# Patient Record
Sex: MALE | Race: OTHER | Employment: UNEMPLOYED | URBAN - METROPOLITAN AREA
[De-identification: names, ages, dates, MRNs, and addresses within clinical notes are randomized per-mention and may not be internally consistent; named-entity substitution may affect disease eponyms.]

---

## 2017-04-19 ENCOUNTER — OFFICE VISIT (OUTPATIENT)
Dept: FAMILY MEDICINE CLINIC | Age: 57
End: 2017-04-19

## 2017-04-19 ENCOUNTER — HOSPITAL ENCOUNTER (OUTPATIENT)
Dept: LAB | Age: 57
Discharge: HOME | End: 2017-04-19

## 2017-04-19 VITALS
WEIGHT: 128 LBS | TEMPERATURE: 97.7 F | SYSTOLIC BLOOD PRESSURE: 141 MMHG | HEART RATE: 60 BPM | HEIGHT: 70 IN | DIASTOLIC BLOOD PRESSURE: 84 MMHG | BODY MASS INDEX: 18.32 KG/M2

## 2017-04-19 DIAGNOSIS — J11.1 INFLUENZA: Primary | ICD-10-CM

## 2017-04-19 DIAGNOSIS — Z13.1 SCREENING FOR DIABETES MELLITUS: ICD-10-CM

## 2017-04-19 DIAGNOSIS — Z13.0 SCREENING FOR IRON DEFICIENCY ANEMIA: ICD-10-CM

## 2017-04-19 LAB
ALBUMIN SERPL BCP-MCNC: 4.4 G/DL (ref 3.5–5)
ALBUMIN/GLOB SERPL: 1.6 {RATIO} (ref 1.1–2.2)
ALP SERPL-CCNC: 83 U/L (ref 45–117)
ALT SERPL-CCNC: 32 U/L (ref 12–78)
ANION GAP BLD CALC-SCNC: 6 MMOL/L (ref 5–15)
AST SERPL W P-5'-P-CCNC: 25 U/L (ref 15–37)
BASOPHILS # BLD AUTO: 0 K/UL (ref 0–0.1)
BASOPHILS # BLD: 0 % (ref 0–1)
BILIRUB SERPL-MCNC: 0.8 MG/DL (ref 0.2–1)
BUN SERPL-MCNC: 7 MG/DL (ref 6–20)
BUN/CREAT SERPL: 8 (ref 12–20)
CALCIUM SERPL-MCNC: 9 MG/DL (ref 8.5–10.1)
CHLORIDE SERPL-SCNC: 105 MMOL/L (ref 97–108)
CO2 SERPL-SCNC: 30 MMOL/L (ref 21–32)
CREAT SERPL-MCNC: 0.86 MG/DL (ref 0.7–1.3)
EOSINOPHIL # BLD: 0.2 K/UL (ref 0–0.4)
EOSINOPHIL NFR BLD: 3 % (ref 0–7)
ERYTHROCYTE [DISTWIDTH] IN BLOOD BY AUTOMATED COUNT: 14.4 % (ref 11.5–14.5)
EST. AVERAGE GLUCOSE BLD GHB EST-MCNC: 123 MG/DL
GLOBULIN SER CALC-MCNC: 2.8 G/DL (ref 2–4)
GLUCOSE POC: NORMAL MG/DL
GLUCOSE SERPL-MCNC: 100 MG/DL (ref 65–100)
HBA1C MFR BLD: 5.9 % (ref 4.2–6.3)
HCT VFR BLD AUTO: 44.5 % (ref 36.6–50.3)
HGB BLD-MCNC: 14.4 G/DL
HGB BLD-MCNC: 15.3 G/DL (ref 12.1–17)
LYMPHOCYTES # BLD AUTO: 31 % (ref 12–49)
LYMPHOCYTES # BLD: 1.7 K/UL (ref 0.8–3.5)
MCH RBC QN AUTO: 30.7 PG (ref 26–34)
MCHC RBC AUTO-ENTMCNC: 34.4 G/DL (ref 30–36.5)
MCV RBC AUTO: 89.2 FL (ref 80–99)
MONOCYTES # BLD: 0.4 K/UL (ref 0–1)
MONOCYTES NFR BLD AUTO: 6 % (ref 5–13)
NEUTS SEG # BLD: 3.3 K/UL (ref 1.8–8)
NEUTS SEG NFR BLD AUTO: 60 % (ref 32–75)
PLATELET # BLD AUTO: 221 K/UL (ref 150–400)
POTASSIUM SERPL-SCNC: 4.3 MMOL/L (ref 3.5–5.1)
PROT SERPL-MCNC: 7.2 G/DL (ref 6.4–8.2)
RBC # BLD AUTO: 4.99 M/UL (ref 4.1–5.7)
SODIUM SERPL-SCNC: 141 MMOL/L (ref 136–145)
TSH SERPL DL<=0.05 MIU/L-ACNC: 1.5 UIU/ML (ref 0.36–3.74)
WBC # BLD AUTO: 5.6 K/UL (ref 4.1–11.1)

## 2017-04-19 PROCEDURE — 80053 COMPREHEN METABOLIC PANEL: CPT | Performed by: NURSE PRACTITIONER

## 2017-04-19 PROCEDURE — 87389 HIV-1 AG W/HIV-1&-2 AB AG IA: CPT | Performed by: NURSE PRACTITIONER

## 2017-04-19 PROCEDURE — 85025 COMPLETE CBC W/AUTO DIFF WBC: CPT | Performed by: NURSE PRACTITIONER

## 2017-04-19 PROCEDURE — 84443 ASSAY THYROID STIM HORMONE: CPT | Performed by: NURSE PRACTITIONER

## 2017-04-19 PROCEDURE — 83036 HEMOGLOBIN GLYCOSYLATED A1C: CPT | Performed by: NURSE PRACTITIONER

## 2017-04-19 RX ORDER — IBUPROFEN 600 MG/1
600 TABLET ORAL
Qty: 30 TAB | Refills: 0 | Status: SHIPPED | OUTPATIENT
Start: 2017-04-19 | End: 2018-01-02

## 2017-04-19 NOTE — PROGRESS NOTES
Coordination of Care  1. Have you been to the ER, urgent care clinic since your last visit? Hospitalized since your last visit? No    2. Have you seen or consulted any other health care providers outside of the [address] [place] [address] since your last visit? Include any pap smears or colon screening.  No    Medications  Medication Reconciliation Performed: no  Patient does need refills     Learning Assessment Complete? yes  Results for orders placed or performed in visit on 04/19/17   AMB POC HEMOGLOBIN (HGB)   Result Value Ref Range    Hemoglobin (POC) 14.4

## 2017-04-19 NOTE — PROGRESS NOTES
Assessment/Plan:       ICD-10-CM ICD-9-CM    1. Influenza J11.1 487.1 HEMOGLOBIN A1C WITH EAG      METABOLIC PANEL, COMPREHENSIVE      CBC WITH AUTOMATED DIFF      HIV 2 AB W/REFL IB      TSH 3RD GENERATION      ibuprofen (MOTRIN) 600 mg tablet   2. Screening for iron deficiency anemia Z13.0 V78.0 AMB POC HEMOGLOBIN (HGB)      CANCELED: HEMOGLOBIN A1C WITH EAG   3. Screening for diabetes mellitus Z13.1 V77.1 AMB POC GLUCOSE BLOOD, BY GLUCOSE MONITORING DEVICE     Follow-up Disposition:  Return if symptoms worsen or fail to improve. [facility]  Subjective:   Kiki Jean Baptiste is a 64 y.o. OTHER male who speaks Tamazight. Chief Complaint   Patient presents with    Generalized Body Aches     dizzy, feels like a fever in the morning    History of Present Illness: for 15 days. Fever, bodyache,and when he bends over he gets dizzy. Took tylenol and amoxicillin for body aches. The last 2 days no amoxicillin. 2 d ago last took Tylenol. He has a problem getting out of bed in the morning and low appetite. He did not get the vaccine. No change in urination. He does get a burning sensation in the rectum. No one else he knows has had this. Taking coffee with organic ganoderma Lucidum Powder, helps him go to the bathroom; rectum does burn sometimes after  Review of Systems: Negative for: fever, chest pain, shortness of breath, leg swelling, exertional dyspnea, palpitations. Past Surgical History: He  has no past surgical history on file. Social History: He  reports that he has never smoked. He has never used smokeless tobacco. He reports that he does not drink alcohol or use illicit drugs. Objective:     Vitals:    04/19/17 0928   BP: 141/84   Pulse: 60   Temp: 97.7 °F (36.5 °C)   TempSrc: Oral   Weight: 128 lb (58.1 kg)   Height: 5' 9.88\" (1.775 m)    No LMP for male patient.    Wt Readings from Last 2 Encounters:   04/19/17 128 lb (58.1 kg)   03/24/16 136 lb (61.7 kg)   I note weight loss.  Lab Review:  Discussed not anemic, not diabetic. Results for orders placed or performed in visit on 04/19/17   AMB POC HEMOGLOBIN (HGB)   Result Value Ref Range    Hemoglobin (POC) 14.4    AMB POC GLUCOSE BLOOD, BY GLUCOSE MONITORING DEVICE   Result Value Ref Range    Glucose POC 88 non fasting mg/dL      Physical Examination: Thin although not emaciated. General appearance - no acute distress. Chest - clear to auscultation. Heart - regular rate and rhythm without murmurs, rubs, or gallops. Abdomen - bowel sounds present x 4, NT, ND. Extremities - pulses intact. No peripheral edema. Assessment/Plan:   Sohail Gibson was seen today for generalized body aches. Diagnoses and all orders for this visit:    Influenza  -     HEMOGLOBIN A1C WITH EAG; Future  -     METABOLIC PANEL, COMPREHENSIVE; Future  -     CBC WITH AUTOMATED DIFF; Future  -     HIV 2 AB W/REFL IB; Future  -     TSH 3RD GENERATION; Future  -     ibuprofen (MOTRIN) 600 mg tablet; Take 1 Tab by mouth every six (6) hours as needed for Pain. Faroese sig    Screening for iron deficiency anemia  -     AMB POC HEMOGLOBIN (HGB)  -     Cancel: HEMOGLOBIN A1C WITH EAG; Future    Screening for diabetes mellitus  -     AMB POC GLUCOSE BLOOD, BY GLUCOSE MONITORING DEVICE    Education re: influenza vaccine strongly recommended if still available for this season, in 2 weeks, or if not available, for future seasons - once a year. Follow-up Disposition:  Return if symptoms worsen or fail to improve. Franco Schaumann, MSN, RN, FNP-BC, BC-ADM  Fred Ness expressed understanding of this plan.

## 2017-04-21 LAB
HIV 1+2 AB+HIV1 P24 AG SERPL QL IA: NONREACTIVE
HIV12 RESULT COMMENT, HHIVC: NORMAL

## 2018-01-02 ENCOUNTER — OFFICE VISIT (OUTPATIENT)
Dept: FAMILY MEDICINE CLINIC | Age: 58
End: 2018-01-02

## 2018-01-02 ENCOUNTER — HOSPITAL ENCOUNTER (OUTPATIENT)
Dept: LAB | Age: 58
Discharge: HOME | End: 2018-01-02

## 2018-01-02 VITALS
BODY MASS INDEX: 19.29 KG/M2 | WEIGHT: 134 LBS | SYSTOLIC BLOOD PRESSURE: 138 MMHG | TEMPERATURE: 98.4 F | DIASTOLIC BLOOD PRESSURE: 76 MMHG | HEART RATE: 73 BPM

## 2018-01-02 DIAGNOSIS — L30.9 DERMATITIS: Primary | ICD-10-CM

## 2018-01-02 DIAGNOSIS — M54.9 BACK PAIN, UNSPECIFIED BACK LOCATION, UNSPECIFIED BACK PAIN LATERALITY, UNSPECIFIED CHRONICITY: ICD-10-CM

## 2018-01-02 DIAGNOSIS — R68.2 DRY MOUTH: ICD-10-CM

## 2018-01-02 PROCEDURE — 83036 HEMOGLOBIN GLYCOSYLATED A1C: CPT | Performed by: NURSE PRACTITIONER

## 2018-01-02 RX ORDER — TRIAMCINOLONE ACETONIDE 1 MG/G
CREAM TOPICAL 2 TIMES DAILY
Qty: 80 G | Refills: 0 | Status: SHIPPED | OUTPATIENT
Start: 2018-01-02 | End: 2018-11-05

## 2018-01-02 NOTE — PROGRESS NOTES
Assessment/Plan:       ICD-10-CM ICD-9-CM    1. Dermatitis L30.9 692.9 triamcinolone acetonide (KENALOG) 0.1 % topical cream      glycerin-mineral oil-lanolin (EUCERIN PLUS) topical cream   2. Dry mouth R68.2 527.7 HEMOGLOBIN A1C WITH EAG   3. Back pain, unspecified back location, unspecified back pain laterality, unspecified chronicity M54.9 724.5        [address], [address] [facility] [address]  [address]  [place] [facility] [address]  Phone: [phone] Fax: [phone]    [address], [address] [place] [id number]  [address] [facility] [address]  Phone: [phone] Fax: [phone]      [address]   Subjective:     Chief Complaint   Patient presents with    Back Pain     8/10 back/burning pain since about 1 months ago, worst at night    Other     hoarseness    Skin Problem     on and off itchinigness all over body since about 2 weeks ago    Anup Mims is a 62 y.o. OTHER male. HPI: Dry hands, worse in the winter. He has lost his voice 5 or 6 times. This gets better when he drinks water. Started in the summer. Having dryness in the nose. He  has a past medical history of BPH (benign prostatic hyperplasia). He has BPH (benign prostatic hyperplasia) and H/O colonoscopy on his problem list.   Review of Systems: Negative for: fever, chest pain, shortness of breath, leg swelling, exertional dyspnea, palpitations. Current Medications:   No current outpatient prescriptions on file prior to visit. No current facility-administered medications on file prior to visit. Past Surgical History: He  has no past surgical history on file. Social and Family History: He  reports that he has never smoked. He has never used smokeless tobacco. He reports that he does not drink alcohol or use illicit drugs. ; family history includes Arthritis-osteo in his father; Diabetes in his brother; Heart Disease in his brother; Heart Surgery in his brother; Parkinsonism in his mother. Objective:     Vitals:    01/02/18 1028   BP: 138/76   Pulse: 73   Temp: 98.4 °F (36.9 °C)   TempSrc: Oral   Weight: 134 lb (60.8 kg)    No LMP for male patient. Wt Readings from Last 2 Encounters:   01/02/18 134 lb (60.8 kg)   04/19/17 128 lb (58.1 kg)     No results found for any visits on 01/02/18. Physical Examination: No CVAT.  tenderness of paraspinal muscles from the cervical area to the lumbar area bilaterally. Also has sebaceous cyst right upper back. Dry fingers. General appearance - well developed, no acute distress. Chest - clear to auscultation. Heart - regular rate and rhythm without murmurs, rubs, or gallops. Abdomen - bowel sounds present x 4, NT, ND. Extremities - pulses intact. No peripheral edema. Assessment/Plan:   Diagnoses and all orders for this visit:    1. Dermatitis  -     triamcinolone acetonide (KENALOG) 0.1 % topical cream; Apply  to affected area two (2) times a day. use thin layer to your hands; Swedish sig  -     glycerin-mineral oil-lanolin (EUCERIN PLUS) topical cream; Apply  to affected area as needed for Dry Skin. 2. Dry mouth  -     HEMOGLOBIN A1C WITH EAG; Future    3. Back pain, unspecified back location, unspecified back pain laterality, unspecified chronicity    Back stretches, moist heat, prn ibuprofen. Follow-up Disposition:  Return if symptoms worsen or fail to improve. Diane Arias, DNP, FNP-BC, BC-ADM  Fred Sultana expressed understanding of this plan.

## 2018-01-02 NOTE — PROGRESS NOTES
Coordination of Care  1. Have you been to the ER, urgent care clinic since your last visit? Hospitalized since your last visit? No    2. Have you seen or consulted any other health care providers outside of the [address] [place] [address] since your last visit? Include any pap smears or colon screening. No    Medications  Does the patient need refills? NO    Learning Assessment Complete?  yes

## 2018-01-03 LAB
EST. AVERAGE GLUCOSE BLD GHB EST-MCNC: 114 MG/DL
HBA1C MFR BLD: 5.6 % (ref 4.2–6.3)

## 2018-09-18 ENCOUNTER — OFFICE VISIT (OUTPATIENT)
Dept: FAMILY MEDICINE CLINIC | Age: 58
End: 2018-09-18

## 2018-09-18 ENCOUNTER — HOSPITAL ENCOUNTER (OUTPATIENT)
Dept: LAB | Age: 58
Discharge: HOME | End: 2018-09-18

## 2018-09-18 VITALS
HEART RATE: 53 BPM | DIASTOLIC BLOOD PRESSURE: 78 MMHG | HEIGHT: 69 IN | BODY MASS INDEX: 19.43 KG/M2 | SYSTOLIC BLOOD PRESSURE: 133 MMHG | WEIGHT: 131.2 LBS | TEMPERATURE: 97.5 F

## 2018-09-18 DIAGNOSIS — R13.13 PHARYNGEAL DYSPHAGIA: Primary | ICD-10-CM

## 2018-09-18 DIAGNOSIS — R13.13 PHARYNGEAL DYSPHAGIA: ICD-10-CM

## 2018-09-18 DIAGNOSIS — Z11.2 SCREENING FOR STREPTOCOCCAL INFECTION: ICD-10-CM

## 2018-09-18 DIAGNOSIS — M54.9 MID-BACK PAIN, ACUTE: ICD-10-CM

## 2018-09-18 DIAGNOSIS — R19.06 EPIGASTRIC FULLNESS: ICD-10-CM

## 2018-09-18 LAB
ALBUMIN SERPL-MCNC: 4.5 G/DL (ref 3.5–5)
ALBUMIN/GLOB SERPL: 1.6 {RATIO} (ref 1.1–2.2)
ALP SERPL-CCNC: 80 U/L (ref 45–117)
ALT SERPL-CCNC: 27 U/L (ref 12–78)
ANION GAP SERPL CALC-SCNC: 7 MMOL/L (ref 5–15)
AST SERPL-CCNC: 27 U/L (ref 15–37)
BASOPHILS # BLD: 0 K/UL (ref 0–0.1)
BASOPHILS NFR BLD: 0 % (ref 0–1)
BILIRUB SERPL-MCNC: 1.1 MG/DL (ref 0.2–1)
BUN SERPL-MCNC: 10 MG/DL (ref 6–20)
BUN/CREAT SERPL: 11 (ref 12–20)
CALCIUM SERPL-MCNC: 8.9 MG/DL (ref 8.5–10.1)
CHLORIDE SERPL-SCNC: 101 MMOL/L (ref 97–108)
CO2 SERPL-SCNC: 30 MMOL/L (ref 21–32)
CREAT SERPL-MCNC: 0.89 MG/DL (ref 0.7–1.3)
DIFFERENTIAL METHOD BLD: NORMAL
EOSINOPHIL # BLD: 0.1 K/UL (ref 0–0.4)
EOSINOPHIL NFR BLD: 2 % (ref 0–7)
ERYTHROCYTE [DISTWIDTH] IN BLOOD BY AUTOMATED COUNT: 13.9 % (ref 11.5–14.5)
GLOBULIN SER CALC-MCNC: 2.9 G/DL (ref 2–4)
GLUCOSE SERPL-MCNC: 99 MG/DL (ref 65–100)
HCT VFR BLD AUTO: 43.7 % (ref 36.6–50.3)
HGB BLD-MCNC: 14.6 G/DL (ref 12.1–17)
IMM GRANULOCYTES # BLD: 0 K/UL (ref 0–0.04)
IMM GRANULOCYTES NFR BLD AUTO: 0 % (ref 0–0.5)
LIPASE SERPL-CCNC: 150 U/L (ref 73–393)
LYMPHOCYTES # BLD: 1.8 K/UL (ref 0.8–3.5)
LYMPHOCYTES NFR BLD: 31 % (ref 12–49)
MCH RBC QN AUTO: 30.7 PG (ref 26–34)
MCHC RBC AUTO-ENTMCNC: 33.4 G/DL (ref 30–36.5)
MCV RBC AUTO: 91.8 FL (ref 80–99)
MONOCYTES # BLD: 0.5 K/UL (ref 0–1)
MONOCYTES NFR BLD: 8 % (ref 5–13)
NEUTS SEG # BLD: 3.5 K/UL (ref 1.8–8)
NEUTS SEG NFR BLD: 59 % (ref 32–75)
NRBC # BLD: 0 K/UL (ref 0–0.01)
NRBC BLD-RTO: 0 PER 100 WBC
PLATELET # BLD AUTO: 244 K/UL (ref 150–400)
PMV BLD AUTO: 10.6 FL (ref 8.9–12.9)
POTASSIUM SERPL-SCNC: 4 MMOL/L (ref 3.5–5.1)
PROT SERPL-MCNC: 7.4 G/DL (ref 6.4–8.2)
RBC # BLD AUTO: 4.76 M/UL (ref 4.1–5.7)
S PYO AG THROAT QL: NEGATIVE
SODIUM SERPL-SCNC: 138 MMOL/L (ref 136–145)
TSH SERPL DL<=0.05 MIU/L-ACNC: 0.87 UIU/ML (ref 0.36–3.74)
VALID INTERNAL CONTROL?: YES
WBC # BLD AUTO: 5.9 K/UL (ref 4.1–11.1)

## 2018-09-18 PROCEDURE — 85025 COMPLETE CBC W/AUTO DIFF WBC: CPT | Performed by: FAMILY MEDICINE

## 2018-09-18 PROCEDURE — 80053 COMPREHEN METABOLIC PANEL: CPT | Performed by: FAMILY MEDICINE

## 2018-09-18 PROCEDURE — 83690 ASSAY OF LIPASE: CPT | Performed by: FAMILY MEDICINE

## 2018-09-18 PROCEDURE — 84443 ASSAY THYROID STIM HORMONE: CPT | Performed by: FAMILY MEDICINE

## 2018-09-18 RX ORDER — PHENOL/SODIUM PHENOLATE
20 AEROSOL, SPRAY (ML) MUCOUS MEMBRANE
Qty: 90 TAB | Refills: 1 | Status: SHIPPED | OUTPATIENT
Start: 2018-09-18 | End: 2020-03-31 | Stop reason: SDUPTHER

## 2018-09-18 NOTE — PROGRESS NOTES
Coordination of Care 1. Have you been to the ER, urgent care clinic since your last visit? Hospitalized since your last visit? No 
 
2. Have you seen or consulted any other health care providers outside of the [address] [place] [address] since your last visit? Include any pap smears or colon screening. No 
 
Does the patient need refills? YES Learning Assessment Complete? yes Depression Screening complete in the past 12 months? yes Results for orders placed or performed in visit on 09/18/18 AMB POC RAPID STREP A Result Value Ref Range VALID INTERNAL CONTROL POC Yes Group A Strep Ag Negative Negative

## 2018-09-18 NOTE — PROGRESS NOTES
Charleen Webb is a 62 y.o. male Issues discussed today include: Chief Complaint Patient presents with  Decreased Appetite  
  when eating has bloating x 1 week  Sore Throat  
  was taking 500 mg of moxacillian for 8 days and not getting better 1) Throat swelling: Started over 1 month ago. Feels like there is fullness in his throat and makes phlegm in throat, worse at night and first thing in am. Has been taking amoxicillin for 15-22 days total without much improvement, last dose 8 days ago. Bought it off a man from [place], not with rx. Also c/o back pain x 15 days, was taking 1-2 BCs (845mg of aspirin) per day, then stopped bc was told might not be good for him. Says he ha/o fx in lower back in 2014. Also notes \"stomach problem\" x 2 weeks - feels bloated, very full after small meals. Also with constipation and rectal burning. No rectal bleeding or vomiting. Denies known major medical problems. S/p colonoscopy about 2 yrs ago, was told it was normal per pt. No h/o EGD. Data reviewed or ordered today:    
 
Other problems include: 
Patient Active Problem List  
Diagnosis Code  BPH (benign prostatic hyperplasia) N40.0  H/O colonoscopy Z98.890 Medications: 
Current Outpatient Prescriptions Medication Sig Dispense Refill  Omeprazole delayed release (PRILOSEC D/R) 20 mg tablet Take 1 Tab by mouth Daily (before breakfast). [place] 1 tableta gavin vez al [place] antes de desayuno 90 Tab 1  
 triamcinolone acetonide (KENALOG) 0.1 % topical cream Apply  to affected area two (2) times a day. use thin layer to your hands; Welsh sig 80 g 0  
 glycerin-mineral oil-lanolin (EUCERIN PLUS) topical cream Apply  to affected area as needed for Dry Skin. 1 Tube 0 Allergies: 
No Known Allergies LMP:  No LMP for male patient. Social History Social History  Marital status: SINGLE   Spouse name: N/A  
 Number of children: N/A  
 Years of education: N/A  
 
 Occupational History  Not on file. Social History Main Topics  Smoking status: Never Smoker  Smokeless tobacco: Never Used  Alcohol use No  
 Drug use: No  
 Sexual activity: Yes  
  Partners: Female Other Topics Concern  Not on file Social History Narrative ** Merged History Encounter **  
    
 ** Merged History Encounter ** Family History Problem Relation Age of Onset  Parkinsonism Mother  Arthritis-osteo Father  Diabetes Brother  Heart Disease Brother  Heart Surgery Brother Physical Exam  
Visit Vitals  /78 (BP 1 Location: Left arm, BP Patient Position: Sitting)  Pulse (!) 53  Temp 97.5 °F (36.4 °C) (Oral)  Ht 5' 9.49\" (1.765 m)  Wt 131 lb 3.2 oz (59.5 kg)  BMI 19.1 kg/m2 BP Readings from Last 3 Encounters:  
09/18/18 133/78  
01/02/18 138/76  
04/19/17 141/84 Constitutional: Appears well,  No acute distress, Vitals noted Psychiatric:  Affect normal, Alert and Oriented to person/place/time Eyes:  Conjunctiva clear, no drainage ENT:  External ears and nose normal, Mucous membranes moist. TMs grey and non-bulging bilaterally. OP without erythema, edema or exudate. Bilateral nares without active drainage, edema or polyps. Neck:  General inspection normal. Supple. No lymphadenopathy, cervical or supraclavicular. Normal thyroid. Heart:  Normal HR, Normal S1 and S2,  Regular rhythm. No murmurs, rubs or gallops. Lungs:  Clear to auscultation, good respiratory effort, no wheezes, rales or rhonchi Abdomen: Normoactive BS, soft, nondistended, + tenderness in epigastrium and L sided colon, none in RLQ or RUQ. No guarding or rebound, no masses or HSM, no CVAT Extremities: Without edema, good peripheral pulses Skin:  Warm to palpation, without rashes POC Rapid Strep A Test: Negative Assessment/Plan: ICD-10-CM ICD-9-CM 1.  Pharyngeal dysphagia R13.13 787.23 Omeprazole delayed release (PRILOSEC D/R) 20 mg tablet CBC WITH AUTOMATED DIFF  
   METABOLIC PANEL, COMPREHENSIVE  
   TSH 3RD GENERATION 2. Mid-back pain, acute M54.9 724.5 XR SPINE THORAC 3 V  
3. Epigastric fullness R19.06 789.36 Omeprazole delayed release (PRILOSEC D/R) 20 mg tablet LIPASE 4. Screening for streptococcal infection Z11.2 V74.8 AMB POC RAPID STREP A  
 
 
62 y.o. male underweight male with multiple complaints, throat fullness, stomach fullness and bloating, upper back pain. Possible first two sxs related to gastritis, exacerbated by BCs. Wt stable x 2 yrs. - Avoid NSAIDS for now, tylenol only for pain 
- Start prilosec and take daily qam until f/u - Labs today - will call if abnormal or he can call in a week to know results  
- Xray upper back to r/o compression fx in this thin male ? osteoporsis - Would rec referral for EGD or laryngoscopic eval if sxs not improving with tx Follow-up Disposition: 
Return in about 4 weeks (around 10/16/2018) for f/u appt. Marylene Palin, MD 
[address] [facility]

## 2018-09-18 NOTE — PROGRESS NOTES
AVS printed and reviewed. Discussed xray of back as a walk in pt at out pt registration/testing. Pt states he will go to [facility], address given. Billing issues w/ hospital tests discussed and encouraged to apply for financial assistance w/ bills. Discussed bland diet, gave handout on indigestion. Discussion assisted by [employer] phone  #[phone].

## 2018-09-24 ENCOUNTER — HOSPITAL ENCOUNTER (OUTPATIENT)
Dept: GENERAL RADIOLOGY | Age: 58
Discharge: HOME | End: 2018-09-24
Payer: SUBSIDIZED

## 2018-09-24 DIAGNOSIS — M54.9 MID-BACK PAIN, ACUTE: ICD-10-CM

## 2018-09-24 PROCEDURE — 72072 X-RAY EXAM THORAC SPINE 3VWS: CPT

## 2018-09-24 NOTE — PROGRESS NOTES
Mild arthritis of the thoracic spine, but no fracture or loss of height  Will send letter to inform pt

## 2018-10-15 ENCOUNTER — TELEPHONE (OUTPATIENT)
Dept: FAMILY MEDICINE CLINIC | Age: 58
End: 2018-10-15

## 2018-10-15 NOTE — TELEPHONE ENCOUNTER
Patient call with questions about hopital bills. I let him know that I could schedule an appointment with our [facility] for help. Patient stated he was driving and will call us back to schedule appointment.     Lurdes Beasley

## 2018-11-05 ENCOUNTER — OFFICE VISIT (OUTPATIENT)
Dept: FAMILY MEDICINE CLINIC | Age: 58
End: 2018-11-05

## 2018-11-05 VITALS
BODY MASS INDEX: 19.51 KG/M2 | SYSTOLIC BLOOD PRESSURE: 122 MMHG | TEMPERATURE: 97.6 F | DIASTOLIC BLOOD PRESSURE: 67 MMHG | WEIGHT: 134 LBS | HEART RATE: 50 BPM

## 2018-11-05 DIAGNOSIS — R21 RASH OF HANDS: Primary | ICD-10-CM

## 2018-11-05 RX ORDER — CLOTRIMAZOLE AND BETAMETHASONE DIPROPIONATE 10; .64 MG/G; MG/G
CREAM TOPICAL
Qty: 45 G | Refills: 0 | Status: SHIPPED | OUTPATIENT
Start: 2018-11-05 | End: 2020-03-31

## 2018-11-05 NOTE — PROGRESS NOTES
The following was performed at discharge station per provider with the assistance of , Matt Castano:    AVS printed, reviewed with pt and given to pt. Printed Good RX coupon for  Clotrimazole-Betamethasone cream, $18.  Pt has tickler for appt with Dr. Bradford Mortensen. Pt expressed understanding of the above information. Michelle Alfaro.

## 2018-11-05 NOTE — PROGRESS NOTES
Coordination of Care  1. Have you been to the ER, urgent care clinic since your last visit? Hospitalized since your last visit? No    2. Have you seen or consulted any other health care providers outside of the [address] [place] [address] since your last visit? Include any pap smears or colon screening. No    Does the patient need refills? NO    Learning Assessment Complete?  yes  Depression Screening complete in the past 12 months? yes

## 2018-11-05 NOTE — PROGRESS NOTES
HISTORY OF PRESENT ILLNESS  Waleska Haskins is a 62 y.o. male. HPI  Patient states that for the past 3 years he has been suffering with a problem in his fingers, they seem to crack open. The fingers become itchy and has a burning sensation in the open wounds of the fingers  Review of Systems   Respiratory: Negative for cough, shortness of breath and wheezing. Cardiovascular: Negative for chest pain, palpitations and leg swelling. Gastrointestinal: Negative for abdominal pain, constipation, diarrhea, heartburn, nausea and vomiting. Genitourinary: Negative for dysuria, frequency and urgency. Musculoskeletal: Negative for back pain, myalgias and neck pain. Skin: Positive for itching and rash. Itchy and burning skin lesions on fingers     /67 (BP 1 Location: Left arm)   Pulse (!) 50   Temp 97.6 °F (36.4 °C) (Oral)   Wt 134 lb (60.8 kg)   BMI 19.51 kg/m²   Physical Exam   Constitutional: He appears well-developed and well-nourished. HENT:   Head: Normocephalic. Right Ear: External ear normal.   Left Ear: External ear normal.   Eyes: Conjunctivae and EOM are normal. Pupils are equal, round, and reactive to light. Neck: Normal range of motion. Neck supple. No thyromegaly present. Cardiovascular: Normal rate, regular rhythm and normal heart sounds. Pulmonary/Chest: Effort normal and breath sounds normal. No respiratory distress. He has no wheezes. Abdominal: Soft. He exhibits no distension. There is no tenderness. Musculoskeletal: Normal range of motion. He exhibits no edema or tenderness. Left anular finger flexion deformity   Skin:   There are  Dry, cracked, itchy and painful skin lesions on fingers       ASSESSMENT and PLAN  Diagnoses and all orders for this visit:    1.  Rash of hands  -     clotrimazole-betamethasone (LOTRISONE) topical cream; Apply twice a day to affected area of fingers x 2 weeks      Rash of fingers that has not improved with topical steroids, I will have patient used lotrisone and we will consult with Dr. Emil Ayers

## 2018-11-16 ENCOUNTER — TELEPHONE (OUTPATIENT)
Dept: FAMILY MEDICINE CLINIC | Age: 58
End: 2018-11-16

## 2018-11-16 NOTE — TELEPHONE ENCOUNTER
Kyrgyz-speaking patient, Lenny Syed, [address], called for an appointment with Dr. Hugo Lao. There are no appointments available. He he is also seeking help for a problem he is having with a bill.     Sona Ritchie

## 2018-12-01 ENCOUNTER — APPOINTMENT (OUTPATIENT)
Dept: CT IMAGING | Age: 58
End: 2018-12-01
Attending: NURSE PRACTITIONER
Payer: SUBSIDIZED

## 2018-12-01 ENCOUNTER — HOSPITAL ENCOUNTER (EMERGENCY)
Age: 58
Discharge: HOME | End: 2018-12-01
Attending: EMERGENCY MEDICINE | Admitting: EMERGENCY MEDICINE
Payer: SUBSIDIZED

## 2018-12-01 VITALS
OXYGEN SATURATION: 100 % | BODY MASS INDEX: 16.66 KG/M2 | HEIGHT: 75 IN | DIASTOLIC BLOOD PRESSURE: 70 MMHG | WEIGHT: 134 LBS | SYSTOLIC BLOOD PRESSURE: 125 MMHG | TEMPERATURE: 98.1 F | HEART RATE: 65 BPM | RESPIRATION RATE: 16 BRPM

## 2018-12-01 DIAGNOSIS — L02.91 ABSCESS: Primary | ICD-10-CM

## 2018-12-01 LAB
ALBUMIN SERPL-MCNC: 3.9 G/DL (ref 3.5–5)
ALBUMIN/GLOB SERPL: 1.2 {RATIO} (ref 1.1–2.2)
ALP SERPL-CCNC: 92 U/L (ref 45–117)
ALT SERPL-CCNC: 20 U/L (ref 12–78)
ANION GAP SERPL CALC-SCNC: 10 MMOL/L (ref 5–15)
AST SERPL-CCNC: 21 U/L (ref 15–37)
BASOPHILS # BLD: 0 K/UL (ref 0–0.1)
BASOPHILS NFR BLD: 0 % (ref 0–1)
BILIRUB SERPL-MCNC: 1.1 MG/DL (ref 0.2–1)
BUN SERPL-MCNC: 10 MG/DL (ref 6–20)
BUN/CREAT SERPL: 10 (ref 12–20)
CALCIUM SERPL-MCNC: 8.9 MG/DL (ref 8.5–10.1)
CHLORIDE SERPL-SCNC: 103 MMOL/L (ref 97–108)
CO2 SERPL-SCNC: 29 MMOL/L (ref 21–32)
COMMENT, HOLDF: NORMAL
CREAT SERPL-MCNC: 0.99 MG/DL (ref 0.7–1.3)
DIFFERENTIAL METHOD BLD: NORMAL
EOSINOPHIL # BLD: 0.2 K/UL (ref 0–0.4)
EOSINOPHIL NFR BLD: 4 % (ref 0–7)
ERYTHROCYTE [DISTWIDTH] IN BLOOD BY AUTOMATED COUNT: 13.2 % (ref 11.5–14.5)
GLOBULIN SER CALC-MCNC: 3.3 G/DL (ref 2–4)
GLUCOSE SERPL-MCNC: 115 MG/DL (ref 65–100)
HCT VFR BLD AUTO: 42.9 % (ref 36.6–50.3)
HGB BLD-MCNC: 15.2 G/DL (ref 12.1–17)
IMM GRANULOCYTES # BLD: 0 K/UL (ref 0–0.04)
IMM GRANULOCYTES NFR BLD AUTO: 0 % (ref 0–0.5)
LYMPHOCYTES # BLD: 2 K/UL (ref 0.8–3.5)
LYMPHOCYTES NFR BLD: 40 % (ref 12–49)
MCH RBC QN AUTO: 31 PG (ref 26–34)
MCHC RBC AUTO-ENTMCNC: 35.4 G/DL (ref 30–36.5)
MCV RBC AUTO: 87.6 FL (ref 80–99)
MONOCYTES # BLD: 0.5 K/UL (ref 0–1)
MONOCYTES NFR BLD: 10 % (ref 5–13)
NEUTS SEG # BLD: 2.2 K/UL (ref 1.8–8)
NEUTS SEG NFR BLD: 45 % (ref 32–75)
NRBC # BLD: 0 K/UL (ref 0–0.01)
NRBC BLD-RTO: 0 PER 100 WBC
PLATELET # BLD AUTO: 260 K/UL (ref 150–400)
PMV BLD AUTO: 10 FL (ref 8.9–12.9)
POTASSIUM SERPL-SCNC: 3.5 MMOL/L (ref 3.5–5.1)
PROT SERPL-MCNC: 7.2 G/DL (ref 6.4–8.2)
RBC # BLD AUTO: 4.9 M/UL (ref 4.1–5.7)
SAMPLES BEING HELD,HOLD: NORMAL
SODIUM SERPL-SCNC: 142 MMOL/L (ref 136–145)
WBC # BLD AUTO: 4.9 K/UL (ref 4.1–11.1)

## 2018-12-01 PROCEDURE — 72129 CT CHEST SPINE W/DYE: CPT

## 2018-12-01 PROCEDURE — 74011000250 HC RX REV CODE- 250: Performed by: NURSE PRACTITIONER

## 2018-12-01 PROCEDURE — 36415 COLL VENOUS BLD VENIPUNCTURE: CPT

## 2018-12-01 PROCEDURE — 74011250636 HC RX REV CODE- 250/636: Performed by: NURSE PRACTITIONER

## 2018-12-01 PROCEDURE — 85025 COMPLETE CBC W/AUTO DIFF WBC: CPT

## 2018-12-01 PROCEDURE — 99284 EMERGENCY DEPT VISIT MOD MDM: CPT

## 2018-12-01 PROCEDURE — 74011636320 HC RX REV CODE- 636/320: Performed by: RADIOLOGY

## 2018-12-01 PROCEDURE — 80053 COMPREHEN METABOLIC PANEL: CPT

## 2018-12-01 PROCEDURE — 74011250637 HC RX REV CODE- 250/637: Performed by: NURSE PRACTITIONER

## 2018-12-01 PROCEDURE — 96360 HYDRATION IV INFUSION INIT: CPT

## 2018-12-01 RX ORDER — CEPHALEXIN 250 MG/1
500 CAPSULE ORAL
Status: COMPLETED | OUTPATIENT
Start: 2018-12-01 | End: 2018-12-01

## 2018-12-01 RX ORDER — CEPHALEXIN 500 MG/1
500 CAPSULE ORAL 4 TIMES DAILY
Qty: 28 CAP | Refills: 0 | Status: SHIPPED | OUTPATIENT
Start: 2018-12-01 | End: 2018-12-08

## 2018-12-01 RX ORDER — LIDOCAINE 50 MG/G
OINTMENT TOPICAL
Status: COMPLETED | OUTPATIENT
Start: 2018-12-01 | End: 2018-12-01

## 2018-12-01 RX ADMIN — IOPAMIDOL 100 ML: 612 INJECTION, SOLUTION INTRAVENOUS at 22:32

## 2018-12-01 RX ADMIN — SODIUM CHLORIDE 1000 ML: 900 INJECTION, SOLUTION INTRAVENOUS at 21:46

## 2018-12-01 RX ADMIN — LIDOCAINE: 50 OINTMENT TOPICAL at 22:02

## 2018-12-01 RX ADMIN — CEPHALEXIN 500 MG: 250 CAPSULE ORAL at 23:33

## 2018-12-02 NOTE — ED PROVIDER NOTES
Virgen Alvarez is a 62 y.o. male with Hx of BPH who presents ambulatory by himself to [facility] ED with cc of draining mass to upper back. Pt reports mass to upper back x2 years. He states he has reported this concern to his PCP. The mass has not bothered him until tonight. He states that is opened up and started draining \"pus\" in the last 24h. Noted subjective fevers and body aches. He has concern the mass is infected. No back pain, numbness/ tingling/ weakness in his extremities. He denies any N/V/D, cough, congestion, CP, SOB, dysuria, urinary frequency/hesitancy, flank pain. (-) tobacco/ ETOH/ substance abuse hx. Reports he took 2 tablets of Amoxicillin yesterday to see if that would aide in his s/sx WNR. PCP: None There are no other complaints, changes or physical findings at this time. Past Medical History:  
Diagnosis Date  BPH (benign prostatic hyperplasia) History reviewed. No pertinent surgical history. Family History:  
Problem Relation Age of Onset  Parkinsonism Mother  Arthritis-osteo Father  Diabetes Brother  Heart Disease Brother  Heart Surgery Brother Social History Socioeconomic History  Marital status: SINGLE Spouse name: Not on file  Number of children: Not on file  Years of education: Not on file  Highest education level: Not on file Social Needs  Financial resource strain: Not on file  Food insecurity - worry: Not on file  Food insecurity - inability: Not on file  Transportation needs - medical: Not on file  Transportation needs - non-medical: Not on file Occupational History  Not on file Tobacco Use  Smoking status: Never Smoker  Smokeless tobacco: Never Used Substance and Sexual Activity  Alcohol use: No  
  Alcohol/week: 0.0 oz  Drug use: No  
 Sexual activity: Yes  
  Partners: Female Other Topics Concern  Not on file Social History Narrative ** Merged History Encounter **  
    
 ** Merged History Encounter ** ALLERGIES: Patient has no known allergies. Review of Systems Constitutional: Positive for chills and fever. Negative for activity change and appetite change. HENT: Negative for congestion, rhinorrhea, sinus pressure, sneezing and sore throat. Eyes: Negative for pain, discharge and visual disturbance. Respiratory: Negative for cough and shortness of breath. Cardiovascular: Negative for chest pain. Gastrointestinal: Negative for abdominal pain, diarrhea, nausea and vomiting. Genitourinary: Negative for dysuria, flank pain, frequency and urgency. Musculoskeletal: Negative for arthralgias, back pain, gait problem, joint swelling, myalgias and neck pain. Skin: Negative for color change and rash. Draining mass proximal to skin on thoracic spine Neurological: Negative for dizziness, speech difficulty, weakness, light-headedness, numbness and headaches. Psychiatric/Behavioral: Negative for agitation, behavioral problems and confusion. All other systems reviewed and are negative. Vitals:  
 12/01/18 2140 12/01/18 2150 12/01/18 2200 12/01/18 2210 BP:  115/77 126/87 126/53 Pulse:      
Resp:      
Temp:      
SpO2: 98% 98% 99% 99% Weight:      
Height:      
      
 
Physical Exam  
Constitutional: He is oriented to person, place, and time. He appears well-developed and well-nourished. No distress. HENT:  
Head: Normocephalic and atraumatic. Right Ear: External ear normal.  
Left Ear: External ear normal.  
Nose: Nose normal.  
Mouth/Throat: Oropharynx is clear and moist. No oropharyngeal exudate. Eyes: Conjunctivae and EOM are normal. Pupils are equal, round, and reactive to light. Neck: Normal range of motion. Neck supple. Cardiovascular: Normal rate, regular rhythm, normal heart sounds and intact distal pulses.   
Pulmonary/Chest: Effort normal.  
 Abdominal: Soft. There is no tenderness. There is no rebound and no guarding. Musculoskeletal:  
     Arms: 
Neurological: He is alert and oriented to person, place, and time. Skin: Skin is warm and dry. Psychiatric: He has a normal mood and affect. His behavior is normal. Judgment and thought content normal.  
Nursing note and vitals reviewed. MDM Number of Diagnoses or Management Options Abscess:  
Diagnosis management comments: DDx: abscess vs sebaceous cyst, mass 61 yo M presents w/ draining mass proximal to thoracic spine. Sebaceous cyst vs abscess on CT scan. Labs reassuring. Started on Keflex. Advised to f/u w/ [facility] ASAP. Wound care education for home provided. Amount and/or Complexity of Data Reviewed Clinical lab tests: ordered and reviewed Tests in the radiology section of CPT®: ordered and reviewed Review and summarize past medical records: yes Discuss the patient with other providers: yes Alaina Hicks MD ) Procedures LABORATORY TESTS: 
Recent Results (from the past 12 hour(s)) CBC WITH AUTOMATED DIFF Collection Time: 12/01/18  9:39 PM  
Result Value Ref Range WBC 4.9 4.1 - 11.1 K/uL  
 RBC 4.90 4. 10 - 5.70 M/uL  
 HGB 15.2 12.1 - 17.0 g/dL HCT 42.9 36.6 - 50.3 % MCV 87.6 80.0 - 99.0 FL  
 MCH 31.0 26.0 - 34.0 PG  
 MCHC 35.4 30.0 - 36.5 g/dL  
 RDW 13.2 11.5 - 14.5 % PLATELET 877 560 - 461 K/uL MPV 10.0 8.9 - 12.9 FL  
 NRBC 0.0 0  WBC ABSOLUTE NRBC 0.00 0.00 - 0.01 K/uL NEUTROPHILS 45 32 - 75 % LYMPHOCYTES 40 12 - 49 % MONOCYTES 10 5 - 13 % EOSINOPHILS 4 0 - 7 % BASOPHILS 0 0 - 1 % IMMATURE GRANULOCYTES 0 0.0 - 0.5 % ABS. NEUTROPHILS 2.2 1.8 - 8.0 K/UL  
 ABS. LYMPHOCYTES 2.0 0.8 - 3.5 K/UL  
 ABS. MONOCYTES 0.5 0.0 - 1.0 K/UL  
 ABS. EOSINOPHILS 0.2 0.0 - 0.4 K/UL  
 ABS. BASOPHILS 0.0 0.0 - 0.1 K/UL  
 ABS. IMM. GRANS. 0.0 0.00 - 0.04 K/UL  
 DF AUTOMATED METABOLIC PANEL, COMPREHENSIVE  
 Collection Time: 12/01/18  9:39 PM  
Result Value Ref Range Sodium 142 136 - 145 mmol/L Potassium 3.5 3.5 - 5.1 mmol/L Chloride 103 97 - 108 mmol/L  
 CO2 29 21 - 32 mmol/L Anion gap 10 5 - 15 mmol/L Glucose 115 (H) 65 - 100 mg/dL BUN 10 6 - 20 MG/DL Creatinine 0.99 0.70 - 1.30 MG/DL  
 BUN/Creatinine ratio 10 (L) 12 - 20 GFR est AA >60 >60 ml/min/1.73m2 GFR est non-AA >60 >60 ml/min/1.73m2 Calcium 8.9 8.5 - 10.1 MG/DL Bilirubin, total 1.1 (H) 0.2 - 1.0 MG/DL  
 ALT (SGPT) 20 12 - 78 U/L  
 AST (SGOT) 21 15 - 37 U/L Alk. phosphatase 92 45 - 117 U/L Protein, total 7.2 6.4 - 8.2 g/dL Albumin 3.9 3.5 - 5.0 g/dL Globulin 3.3 2.0 - 4.0 g/dL A-G Ratio 1.2 1.1 - 2.2 SAMPLES BEING HELD Collection Time: 12/01/18  9:39 PM  
Result Value Ref Range SAMPLES BEING HELD 1BL,1RED,1SST   
 COMMENT Add-on orders for these samples will be processed based on acceptable specimen integrity and analyte stability, which may vary by analyte. IMAGING RESULTS: 
CT SPINE [facility] W CONT Final Result EXAM:  CT SPINE [facility] W CONT INDICATION: Mass. To skin adjacent to the thoracic spine. COMPARISON: None. 
  
TECHNIQUE:  
Following the uneventful intravenous administration of 100 mL [id number], 
multislice helical CT of the thoracic spine was performed. Sagittal and coronal 
reformations were generated. CT dose reduction was achieved through use of a 
standardized protocol tailored for this examination and automatic exposure 
control for dose modulation. 
  
FINDINGS: 
The alignment of the thoracic spine is normal.  
The vertebral body heights and disc spaces are well-preserved. There is no fracture or other acute abnormality. There is no spinal canal stenosis. 1.6 cm subcutaneous cyst just to the right of midline at the T4 level. 
  
IMPRESSION IMPRESSION: No evidence of thoracic spine fracture or subluxation. Probable sebaceous cyst or subcutaneous abscess; clinical follow-up is recommended. MEDICATIONS GIVEN: 
Medications  
sodium chloride 0.9 % bolus infusion 1,000 mL (0 mL IntraVENous IV Completed 12/1/18 2246) iopamidol (ISOVUE 300) 61 % contrast injection 100 mL (100 mL IntraVENous Given 12/1/18 2232) lidocaine (XYLOCAINE) 5 % ointment ( Topical Given 12/1/18 2202) cephALEXin (KEFLEX) capsule 500 mg (500 mg Oral Given 12/1/18 2333) IMPRESSION: 
1. Abscess PLAN: 
1. Discharge Medication List as of 12/1/2018 11:34 PM  
  
START taking these medications Details  
cephALEXin (KEFLEX) 500 mg capsule Take 1 Cap by mouth four (4) times daily for 7 days. , Print, Disp-28 Cap, R-0  
  
  
CONTINUE these medications which have NOT CHANGED Details  
clotrimazole-betamethasone (LOTRISONE) topical cream Apply twice a day to affected area of fingers x 2 weeks, Normal, Disp-45 g, R-0 Omeprazole delayed release (PRILOSEC D/R) 20 mg tablet Take 1 Tab by mouth Daily (before breakfast). Fleming 1 tableta gavin vez al [place] antes de desayuno, Normal, Disp-90 Tab, R-1  
  
glycerin-mineral oil-lanolin (EUCERIN PLUS) topical cream Apply  to affected area as needed for Dry Skin., Normal, Disp-1 Tube, R-0  
  
  
 
2. Follow-up Information Follow up With Specialties Details Why Contact Info [facility]  Schedule an appointment as soon as possible for a visit  [address] [address] [phone] [facility] EMERGENCY DEPT Emergency Medicine Go to As needed, If symptoms worsen [address] 
[phone] 3. Return to ED if worse Discharge Note: The patient is ready for discharge. The patient's signs, symptoms, diagnosis, and discharge instruction have been discussed and the patient has conveyed their understanding. The patient is to follow up as recommended or return to the ER should their symptoms worsen.  Plan has been discussed and the patient is in agreement.  
 
Missael Miles, NP

## 2018-12-02 NOTE — DISCHARGE INSTRUCTIONS
Absceso cutáneo: Instrucciones de cuidado - [ Skin Abscess: Care Instructions ]  Instrucciones de cuidado    Un absceso de la piel es gavin infección bacteriana que forma gavin bolsa de pus. Un forúnculo es gavin especie de absceso de la piel. Puede que el médico haya hecho gavin incisión en el absceso para que pueda drenar el pus. Patrice vez tenga gavin gasa en el win para que el absceso se mantenga abierto y siga drenando. Geryl Claritza necesite antibióticos. Deberá hacer un seguimiento con nixon médico para asegurarse de que la infección haya desaparecido. El médico lo bravo examinado minuciosamente, oksana pueden desarrollarse problemas más tarde. Si nota algún problema o nuevos síntomas, busque tratamiento médico de inmediato. La atención de seguimiento es gavin parte clave de nixon tratamiento y seguridad. Asegúrese de hacer y acudir a todas las citas, y llame a nixon médico si está teniendo problemas. También es gavin buena idea saber los resultados de amena exámenes y mantener gavin lista de los medicamentos que alesia. ¿Cómo puede cuidarse en el hogar? · Aplíquese compresas calientes y secas, gavin almohadilla térmica ajustada a baja temperatura o gavin bolsa de [place] 3 o 4 veces al día para el dolor. Mantenga un paño entre la adriano de calor y la piel. · Si nixon médico le recetó antibióticos, tómelos según las indicaciones. No deje de tomarlos solo porque se sienta mejor. Debe dhiraj todos los antibióticos hasta terminarlos. · [employer] analgésicos (medicamentos para el dolor) exactamente según las indicaciones. ? Si el médico le recetó analgésicos, tómelos según las indicaciones. ? Si no está tomando un analgésico recetado, pregúntele a nixon médico si puede dhiraj un medicamento de [employer]. · Mantenga la venda limpia y seca. Cambie la venda cuando se moje o se ensucie, o por lo menos gavin vez al día. · Si se taponó el absceso con gasa:  ? Chad Broom a las citas de seguimiento para que le quiten o le cambien la gasa.  Si el [employer] indicó que se quitara usted la gasa, siga las instrucciones que le dieron acerca de cómo [place]. ? Después de quitar la gasa, empape la alonso con agua tibia de 15 a 20 minutos 2 veces al día, hasta que la herida se cierre. ¿Cuándo debe pedir ayuda? Llame a nixon médico ahora mismo o busque atención médica inmediata si:    · Tiene señales de gavin infección que está empeorando, tales katrin:  ? Aumento del dolor, la hinchazón, la temperatura o el enrojecimiento. ? Vetas rojizas que emanan de la piel infectada. ? Pus que supura de la herida. ? Arn Coffer de cerca los cambios en nixon amy y asegúrese de comunicarse con nixon médico si:    · No mejora katrin se esperaba. ¿Dónde puede encontrar más información en inglés? Farida [place] a [email or web address] [email or web address].[email or web address] August Seip M665 en la búsqueda para aprender más acerca de \"Absceso cutáneo: Instrucciones de cuidado - [ Skin Abscess: Care Instructions ]. \"  Revisado: 18 renuka, 2018  Versión del contenido: 11.8  © 4088-3161 [employer]wise[employer] Inc[employer]. Las instrucciones de cuidado fueron adaptadas bajo licencia por [employer] (which disclaims liability or warranty for this information). Si usted tiene [place] afección médica o sobre estas instrucciones, siempre pregunte a nixon profesional de amy. Healthwise, Incorporated niega toda garantía o responsabilidad por nixon uso de esta información.

## 2018-12-02 NOTE — ED NOTES
Pt arrived to the ED AAOX4, with a c/c of abscess to upper back onset four years ago, draining for the past four days. Pt states he has been following up at the [address] [place] [address]. Pt verbalizes no other complaint at this time. Pt is now in ED room with family at bedside, side rail up, bed to lowest position and call light within reach. VS in stable condition, will continue to monitor.

## 2018-12-04 ENCOUNTER — OFFICE VISIT (OUTPATIENT)
Dept: FAMILY MEDICINE CLINIC | Age: 58
End: 2018-12-04

## 2018-12-04 DIAGNOSIS — Z71.89 COUNSELING AND COORDINATION OF CARE: Primary | ICD-10-CM

## 2018-12-04 NOTE — PROGRESS NOTES
Helped patient with medical bills. Helped him write a self-attestation letter for Care Card application he has at home. Gave him financial counselor's name at [facility] so that he can drop off application along with letter.

## 2019-03-03 ENCOUNTER — HOSPITAL ENCOUNTER (EMERGENCY)
Age: 59
Discharge: HOME | End: 2019-03-04
Attending: EMERGENCY MEDICINE
Payer: SUBSIDIZED

## 2019-03-03 ENCOUNTER — APPOINTMENT (OUTPATIENT)
Dept: GENERAL RADIOLOGY | Age: 59
End: 2019-03-03
Attending: EMERGENCY MEDICINE
Payer: SUBSIDIZED

## 2019-03-03 DIAGNOSIS — R42 DIZZINESS: Primary | ICD-10-CM

## 2019-03-03 DIAGNOSIS — R07.9 ACUTE CHEST PAIN: ICD-10-CM

## 2019-03-03 LAB
BASOPHILS # BLD: 0 K/UL (ref 0–0.1)
BASOPHILS NFR BLD: 0 % (ref 0–1)
COMMENT, HOLDF: NORMAL
DIFFERENTIAL METHOD BLD: NORMAL
EOSINOPHIL # BLD: 0.2 K/UL (ref 0–0.4)
EOSINOPHIL NFR BLD: 4 % (ref 0–7)
ERYTHROCYTE [DISTWIDTH] IN BLOOD BY AUTOMATED COUNT: 13.3 % (ref 11.5–14.5)
HCT VFR BLD AUTO: 40.5 % (ref 36.6–50.3)
HGB BLD-MCNC: 13.7 G/DL (ref 12.1–17)
IMM GRANULOCYTES # BLD AUTO: 0 K/UL (ref 0–0.04)
IMM GRANULOCYTES NFR BLD AUTO: 0 % (ref 0–0.5)
LYMPHOCYTES # BLD: 2.6 K/UL (ref 0.8–3.5)
LYMPHOCYTES NFR BLD: 42 % (ref 12–49)
MCH RBC QN AUTO: 29.8 PG (ref 26–34)
MCHC RBC AUTO-ENTMCNC: 33.8 G/DL (ref 30–36.5)
MCV RBC AUTO: 88 FL (ref 80–99)
MONOCYTES # BLD: 0.5 K/UL (ref 0–1)
MONOCYTES NFR BLD: 8 % (ref 5–13)
NEUTS SEG # BLD: 2.9 K/UL (ref 1.8–8)
NEUTS SEG NFR BLD: 46 % (ref 32–75)
NRBC # BLD: 0 K/UL (ref 0–0.01)
NRBC BLD-RTO: 0 PER 100 WBC
PLATELET # BLD AUTO: 227 K/UL (ref 150–400)
PMV BLD AUTO: 10.3 FL (ref 8.9–12.9)
RBC # BLD AUTO: 4.6 M/UL (ref 4.1–5.7)
SAMPLES BEING HELD,HOLD: NORMAL
TROPONIN I BLD-MCNC: <0.04 NG/ML (ref 0–0.08)
WBC # BLD AUTO: 6.3 K/UL (ref 4.1–11.1)

## 2019-03-03 PROCEDURE — 80048 BASIC METABOLIC PNL TOTAL CA: CPT

## 2019-03-03 PROCEDURE — 84484 ASSAY OF TROPONIN QUANT: CPT

## 2019-03-03 PROCEDURE — 71046 X-RAY EXAM CHEST 2 VIEWS: CPT

## 2019-03-03 PROCEDURE — 99285 EMERGENCY DEPT VISIT HI MDM: CPT

## 2019-03-03 PROCEDURE — 36415 COLL VENOUS BLD VENIPUNCTURE: CPT

## 2019-03-03 PROCEDURE — 93005 ELECTROCARDIOGRAM TRACING: CPT

## 2019-03-03 PROCEDURE — 85025 COMPLETE CBC W/AUTO DIFF WBC: CPT

## 2019-03-04 VITALS
HEIGHT: 68 IN | DIASTOLIC BLOOD PRESSURE: 71 MMHG | SYSTOLIC BLOOD PRESSURE: 144 MMHG | RESPIRATION RATE: 15 BRPM | OXYGEN SATURATION: 100 % | HEART RATE: 43 BPM | WEIGHT: 142 LBS | TEMPERATURE: 98.2 F | BODY MASS INDEX: 21.52 KG/M2

## 2019-03-04 LAB
ANION GAP SERPL CALC-SCNC: 6 MMOL/L (ref 5–15)
ATRIAL RATE: 58 BPM
BUN SERPL-MCNC: 10 MG/DL (ref 6–20)
BUN/CREAT SERPL: 11 (ref 12–20)
CALCIUM SERPL-MCNC: 8.5 MG/DL (ref 8.5–10.1)
CALCULATED P AXIS, ECG09: 56 DEGREES
CALCULATED R AXIS, ECG10: 63 DEGREES
CALCULATED T AXIS, ECG11: 69 DEGREES
CHLORIDE SERPL-SCNC: 107 MMOL/L (ref 97–108)
CO2 SERPL-SCNC: 28 MMOL/L (ref 21–32)
CREAT SERPL-MCNC: 0.91 MG/DL (ref 0.7–1.3)
DIAGNOSIS, 93000: NORMAL
GLUCOSE SERPL-MCNC: 89 MG/DL (ref 65–100)
P-R INTERVAL, ECG05: 136 MS
POTASSIUM SERPL-SCNC: 3.7 MMOL/L (ref 3.5–5.1)
Q-T INTERVAL, ECG07: 426 MS
QRS DURATION, ECG06: 98 MS
QTC CALCULATION (BEZET), ECG08: 418 MS
SODIUM SERPL-SCNC: 141 MMOL/L (ref 136–145)
VENTRICULAR RATE, ECG03: 58 BPM

## 2019-03-04 PROCEDURE — 74011250636 HC RX REV CODE- 250/636: Performed by: EMERGENCY MEDICINE

## 2019-03-04 PROCEDURE — 74011250637 HC RX REV CODE- 250/637: Performed by: EMERGENCY MEDICINE

## 2019-03-04 PROCEDURE — 96360 HYDRATION IV INFUSION INIT: CPT

## 2019-03-04 RX ORDER — LORAZEPAM 1 MG/1
1 TABLET ORAL
Status: COMPLETED | OUTPATIENT
Start: 2019-03-04 | End: 2019-03-04

## 2019-03-04 RX ADMIN — SODIUM CHLORIDE 1000 ML: 900 INJECTION, SOLUTION INTRAVENOUS at 01:26

## 2019-03-04 RX ADMIN — LORAZEPAM 1 MG: 1 TABLET ORAL at 00:27

## 2019-03-04 NOTE — ED PROVIDER NOTES
Mickael Bosworth is a 62 y.o. male who presents ambulatory to the ED with a c/o chest pain, dizziness onset today. Pt currently rates his pain at 2/10 in severity. Hx is provided through phone  #[phone]. Pt states that he began having chest pain and feeling dizzy around 8:00 pm when he arrived at [facility] this evening. He reports that his dizziness feels like he is about to faint and he notes mild chest pain. Pt also states that he is experiencing visual disturbance which pt states is because \"my whole body feels nervous. \" Pt endorses fatigue, nervousness/anxiety, dizziness, chest pain, nausea but denies vomiting, diarrhea, cough, shortness of breath, leg swelling, fever, chills, or any other acute sx. Pt denies any recent travel, known sick contacts, or recent illness. PCP: None PMHx significant for:BPH 
PSHx significant for: none Social Hx: Tobacco: none EtOH: none Illicit drug use: none There are no further complaints or symptoms at this time. Signed by: juana Keller for Lian Carlson MD on  March 4th, 2019 at 00:12am. 
 
 
 
 
  
 
Past Medical History:  
Diagnosis Date  BPH (benign prostatic hyperplasia) No past surgical history on file. Family History:  
Problem Relation Age of Onset  Parkinsonism Mother  Arthritis-osteo Father  Diabetes Brother  Heart Disease Brother  Heart Surgery Brother Social History Socioeconomic History  Marital status: SINGLE Spouse name: Not on file  Number of children: Not on file  Years of education: Not on file  Highest education level: Not on file Social Needs  Financial resource strain: Not on file  Food insecurity - worry: Not on file  Food insecurity - inability: Not on file  Transportation needs - medical: Not on file  Transportation needs - non-medical: Not on file Occupational History  Not on file Tobacco Use  
  Smoking status: Never Smoker  Smokeless tobacco: Never Used Substance and Sexual Activity  Alcohol use: No  
  Alcohol/week: 0.0 oz  Drug use: No  
 Sexual activity: Yes  
  Partners: Female Other Topics Concern  Not on file Social History Narrative ** Merged History Encounter **  
    
 ** Merged History Encounter ** ALLERGIES: Patient has no known allergies. Review of Systems Constitutional: Positive for fatigue. Negative for chills and fever. Eyes: Positive for visual disturbance (blurred vison). Respiratory: Negative for cough and shortness of breath. Cardiovascular: Positive for chest pain. Negative for leg swelling. Gastrointestinal: Positive for nausea. Negative for abdominal pain, diarrhea and vomiting. Neurological: Positive for dizziness and weakness. Psychiatric/Behavioral: The patient is nervous/anxious. All other systems reviewed and are negative. Vitals:  
 03/03/19 2325 03/04/19 0015 03/04/19 0030 BP: 136/74 (!) 148/93 148/81 Pulse: (!) 56 (!) 58 (!) 56 Resp: 18 16 17 Temp: 98.2 °F (36.8 °C) SpO2: 100% 100% 100% Weight: 64.4 kg (142 lb) Height: 5' 8\" (1.727 m) Physical Exam  
Constitutional: He is oriented to person, place, and time. He appears well-developed and well-nourished. No distress. HENT:  
Head: Normocephalic and atraumatic. Eyes: Conjunctivae are normal. No scleral icterus. Neck: Neck supple. No tracheal deviation present. Cardiovascular: Normal rate, regular rhythm, normal heart sounds and intact distal pulses. Exam reveals no gallop and no friction rub. No murmur heard. Pulmonary/Chest: Effort normal and breath sounds normal. He has no wheezes. He has no rales. Left breast exhibits tenderness. Abdominal: Soft. He exhibits no distension. There is tenderness in the epigastric area. There is no rebound and no guarding. Musculoskeletal: He exhibits no edema. Neurological: He is alert and oriented to person, place, and time. Skin: Skin is warm and dry. No rash noted. Psychiatric: He has a normal mood and affect. Nursing note and vitals reviewed. MDM Procedures ED EKG interpretation: 
Rhythm: sinus bradycardia; and regular . Rate (approx.): 58; Axis: normal; ST/T wave: normal; This EKG was interpreted by Dvaid Hand MD,ED Provider. Progress Note: 
Results, treatment, and follow up plan have been discussed with patient/friend. Questions were answered. He feels better after fluids. David Hand MD 
2:25 AM 
 
 A/P: dizziness, CP, anxiety - unclear etiology; reassuring appearance and exam; VSS (HR on low side but doubt it's related to current dizziness); CBC, CMP, trop, EKG, CXR ok; home with  PCP/cards f/u.   David Hand MD 
2:26 AM

## 2019-03-04 NOTE — DISCHARGE INSTRUCTIONS
Patient Education        Mareos: Luis Rodarte - [ Dizziness: Care Instructions ]  Instrucciones de cuidado  Los mareos son [place] sensación de inestabilidad o confusión en la kathie. Son distintos al vértigo, gavin sensación de que la habitación gira o de que usted se mueve o . También es distinto del aturdimiento, que es la sensación de que está a punto de desmayarse. Puede resultar difícil conocer la causa de los [place]. Algunas personas se sienten mareadas cuando tienen migrañas. A veces, los episodios gripales pueden hacer que se sienta mareado. Algunas afecciones médicas, katrin los problemas cardíacos o la presión arterial mino, pueden hacer que se sienta mareado. Muchos medicamentos pueden causar mareos, katrin los que se usan para la presión arterial mino, el dolor o la ansiedad. Si es un medicamento el que está causando los síntomas, nixon médico podría recomendarle que lo cambie o deje de tomarlo. Si es un problema cardíaco, podría necesitar medicamentos para ayudar a que nixon corazón funcione mejor. Si no hay razón aparente para los síntomas, nixon médico podría sugerir vigilar y esperar lacy un tiempo para mars si los mareos desaparecen por sí solos. La atención de seguimiento es gavin parte clave de nixon tratamiento y seguridad. Asegúrese de hacer y acudir a todas las citas, y llame a nixon médico si está teniendo problemas. También es gavin buena idea saber los resultados de amena exámenes y mantener gavin lista de los medicamentos que alesia. ¿Cómo puede cuidarse en el hogar? · Si nixon médico le recomienda o receta medicamentos, tómelos exactamente según las indicaciones. Llame a nixon médico si richard estar teniendo un problema con nixon medicamento. · No conduzca mientras se sienta mareado. · Trate de prevenir las caídas. Algunas medidas que puede dhiraj son:  ? Usar tapetes antideslizantes, agregar agarraderas cerca de la michael y usar luces nocturnas.   ? Ordenar nixon casa de guilherme manera que en los senderos no haya nada con lo que se pueda tropezar. ? Avisarles a familiares y [facility] que se ha estado sintiendo [place]. [place] les servirá para saber cómo ayudarle. ¿Cuándo debe pedir ayuda? Llame al 911 en cualquier momento que considere que necesita atención de [place]. Por ejemplo, llame si:    · Se desmayó (perdió el conocimiento).     · Tiene mareos junto con síntomas de un ataque cardíaco. Estos pueden incluir:  ? Dolor de [place], o presión o gavin sensación extraña en el pecho.  ? Sudoración. ? Falta de aire. ? Náuseas o vómito. ? Dolor, presión, o gavin sensación extraña en la espalda, el estrella, la mandíbula o el abdomen superior, o en merissa o ambos hombros o brazos. ? Aturdimiento o debilidad repentina. ? Un latido cardíaco rápido o irregular.     · Tiene síntomas de un ataque cerebral. Estos pueden incluir:  ? Entumecimiento, hormigueo, debilidad o parálisis repentinos en la marjorie, el brazo o la pierna, sobre todo si ocurre en un solo lado del cuerpo. ? Cambios súbitos en la vista. ? Problemas repentinos para hablar. ? Confusión súbita o dificultad repentina para comprender frases sencillas. ? Problemas repentinos para caminar o mantener el equilibrio. ? Un dolor de kathie intenso y repentino, distinto a los leighton de kathie anteriores.    Llame a nixon médico ahora mismo o busque atención médica inmediata si:    · Se siente mareado y tiene fiebre, dolor de kathie o zumbido en los oídos.     · Tiene nuevas náuseas y vómito o estos aumentan.     · Mariya mareos no desaparecen o regresan.    Preste especial atención a los cambios en nixon amy y asegúrese de comunicarse con nixon médico si:    · No mejora katrin se esperaba. ¿Dónde puede encontrar más información en inglés? Flaquito Brennan a [email or web address] Tiara Spotted [id number] en la búsqueda para aprender más acerca de \"Mareos: Instrucciones de cuidado - [ Dizziness: Care Instructions ]. \"  Revisado: 23 septiembre, 2018  Versión del contenido: 11.9  © 7197-1516 [employer]. Las instrucciones de cuidado fueron adaptadas bajo licencia por [employer] (which disclaims liability or warranty for this information). Si usted tiene [place] afección médica o sobre estas instrucciones, siempre pregunte a nixon profesional de amy. Healthwise, Incorporated niega toda garantía o responsabilidad por nixon uso de esta información. Patient Education        Dolor de pecho en niños: Instrucciones de cuidado - [ Chest Pain in Children: Care Instructions ]  Instrucciones de cuidado    El dolor de pecho no siempre es gavin señal de que algo está mal en el corazón de nixon hijo o que nixon hijo tiene otro problema luana. El dolor de pecho puede ser causado por músculos o ligamentos tensos, cartílago del pecho inflamado u otro problema en el pecho de nixon hijo, en vez de por el corazón. Nixon hijo puede necesitar más pruebas para determinar la causa del dolor de [place]. La atención de seguimiento es gavin parte clave del tratamiento y la seguridad de nixon hijo. Asegúrese de hacer y acudir a todas las citas, y llame a nixon médico si nixon hijo está teniendo problemas. También es gavin buena idea saber los resultados de los exámenes de nixon hijo y mantener gavin lista de los medicamentos que alesia. ¿Cómo puede cuidar a nixon hijo en el hogar? · Sea ronel con los medicamentos. Dé analgésicos (medicamentos para el dolor) exactamente según lo indicado. ? Si el [employer] rodrigo a nixon hijo un analgésico recetado, déselo según las indicaciones. ? Si nixon hijo no está tomando un analgésico recetado, pregúntele a nixon médico si nixon hijo puede dhiraj merissa de [facility]. ? No le dé a nixon hijo dos o más analgésicos al [employer], a menos que el médico se lo haya indicado. Muchos analgésicos contienen acetaminofén, lo cual es Tylenol. El exceso de acetaminofén (Tylenol) puede ser dañino. · Ayúdele a nixon hijo a descansar y a proteger la alonso adolorida.   · Isaías que nixon hijo deje, cambie o se tome un descanso de cualquier actividad que pueda estar causando el dolor. · Aplique hielo o gavin compresa fría sobre la alonso adolorida por 10 a 20 minutos a la vez. Trate de hacerlo cada 1 a 2 horas lacy los 3 días siguientes (cuando nixon hijo esté despierto) o hasta que baje la hinchazón. Ponga un paño alberts entre el hielo y la piel de nixon hijo. · Después de 2 o 3 días, aplique un paño tibio sobre la alonso adolorida. Algunos médicos sugieren que alterne entre calor y frío. · No le envuelva ni vende las costillas a nixon hijo para sostenerlas. [place] puede hacer que nixon hijo tome respiraciones más cortas, lo cual podría aumentar el riesgo de problemas pulmonares. · Ayúdele a nixon hijo a seguir las instrucciones de nixon médico para hacer ejercicio. · El estiramiento ligero y los masajes pueden ayudarle a nixon hijo a mejorar más rápido. [place] que nixon hijo se estire lentamente hasta el punto radha antes de que comience el [place], y que mantenga el estiramiento por 15 a 30 segundos. [place] esto 3 o 4 veces al día, radha después de astrid aplicado calor. · A medida que el dolor de nixon hijo mejora, edelmira que regrese poco a poco a amena actividades normales. Cualquier aumento del dolor puede ser gavin señal de que nixon hijo necesita descansar un rato más. ¿Cuándo debe pedir ayuda? Llame a nixon médico ahora mismo o busque atención médica inmediata si:    · Nixon hijo tiene cualquier dificultad para respirar.     · El dolor de pecho de nixon hijo empeora.     · El dolor de pecho de nixon hijo ocurre siempre que hace ejercicio y se margarita con el reposo.    Preste especial atención a los cambios en la amy de nixon hijo, y asegúrese de comunicarse con nixon médico si nixon hijo no mejora katrin se esperaba. ¿Dónde puede encontrar más información en inglés? Yomi Carr a [email or web address] Escriba L138 en la búsqueda para aprender más acerca de \"Dolor de pecho en niños:  Instrucciones de cuidado - [ Chest Pain in Children: Care Instructions ].\"  Revisado: 23 septiembre, 2018  Versión del contenido: 11.9  © 4123-6154 Healthwise, Incorporated. Las instrucciones de cuidado fueron adaptadas bajo licencia por [employer] (which disclaims liability or warranty for this information). Si usted tiene [place] afección médica o sobre estas instrucciones, siempre pregunte a nixon profesional de amy. Healthwise, Incorporated niega toda garantía o responsabilidad por nixon uso de esta información.

## 2019-03-25 ENCOUNTER — TELEPHONE (OUTPATIENT)
Dept: CARDIOLOGY CLINIC | Age: 59
End: 2019-03-25

## 2019-03-25 ENCOUNTER — OFFICE VISIT (OUTPATIENT)
Dept: CARDIOLOGY CLINIC | Age: 59
End: 2019-03-25

## 2019-03-25 VITALS
OXYGEN SATURATION: 98 % | HEART RATE: 60 BPM | BODY MASS INDEX: 20.46 KG/M2 | RESPIRATION RATE: 20 BRPM | WEIGHT: 135 LBS | DIASTOLIC BLOOD PRESSURE: 60 MMHG | HEIGHT: 68 IN | SYSTOLIC BLOOD PRESSURE: 110 MMHG

## 2019-03-25 DIAGNOSIS — R07.9 CHEST PAIN IN ADULT: Primary | ICD-10-CM

## 2019-03-25 NOTE — PROGRESS NOTES
Complains of sharp chest pain with out SOB. No swelling of feet.   Visit Vitals  /60   Pulse 60   Resp 20   Ht 5' 8\" (1.727 m)   Wt 135 lb (61.2 kg)   SpO2 98%   BMI 20.53 kg/m²

## 2019-03-25 NOTE — PATIENT INSTRUCTIONS
Electrocardiograma de esfuerzo: Acerca de esta prueba - [ Exercise Electrocardiogram: About This Test ]  ¿Qué es esta prueba? Un electrocardiograma (ECG) de esfuerzo es gavin prueba que detecta cambios en nixon corazón mientras hace ejercicio. Algunas veces el médico solamente puede mars los problemas del corazón lacy el ejercicio o mientras los síntomas están presentes. Esta prueba se llama a veces ECG de ejercicio, prueba de esfuerzo o prueba en el caminador mecánico.  ¿Por qué se hace esta prueba? Es posible que necesite esta prueba para verificar la actividad eléctrica de nixon corazón. Puede ayudar a averiguar si un problema del corazón está causando dolor en el pecho y puede ayudar al médico a decidir sobre el mejor tratamiento para ciertos problemas del corazón. También ayuda a encontrar la causa de los síntomas que ocurren lacy el ejercicio o la [place], katrin [place], [place] o latidos del corazón rápidos e irregulares. ¿Cómo puede prepararse para la prueba? · No fume ni ingiera gavin comida pesada antes de la prueba. · Use zapatos planos cómodos (no pantuflas) y pantalones cortos o deportivos sueltos y ligeros. Los zapatos para caminar o para correr son los mejores. · Dígale a nixon médico si:  ? Está tomando algún medicamento. ? Está tomando medicamentos para un problema de erección (katrin Viagra). Es posible que necesite dhiraj nitroglicerina lacy esta prueba, lo cual puede causar gavin reacción grave si ha tomado algún medicamento para un problema de erección en las últimas 48 horas. ? Ha tenido problemas de sangrado o si alesia aspirina o algún otro medicamento para prevenir los coágulos de [place]. ? Tiene problemas en las articulaciones [address] pier[address] que pudieran dificultarle hacer ejercicio. ? Tiene un problema de Marcciprianoa Fabricio. ¿Qué ocurre antes de la prueba? · Los hombres por lo general están con el torso desnudo lacy la prueba.  Madi John a [employer] usar un sostén, gavin camiseta o [employer]. · Es posible que desee estirar los músculos de los brazos y [address] piernas. · Es posible que tenga que quitarse ciertas joyas. · Tendrá un manguito de presión arterial en la parte superior del brazo. · Arty Netter pequeños parches o almohadillas (electrodos) a nixon piel en cada [place] y pierna y en el pecho. Es posible que le pongan gavin pasta especial o gavin almohadilla entre los electrodos y la piel. Es posible que nixon médico le ponga gavin altamirano elástica en el pecho para evitar que se caigan los electrodos. ¿Qué ocurre lacy la prueba? Lo más probable es que camine sobre un caminador mecánico o pedalee en gavin Aislinn Kayla. En el caminador mecánico, comenzará lentamente en gavin posición plana o ligeramente inclinada. Después de ciertos períodos de tiempo, la velocidad y la inclinación del caminador mecánico se incrementarán de modo que tendrá usted que caminar más rápido y con gavin mayor inclinación. En la bicicleta fija, pedaleará lo suficientemente rápido para mantener gavin cierta velocidad. Después de ciertos períodos de tiempo, se aumentará la resistencia, dificultando el pedaleo. En ambas pruebas:  · Se registran nixon ECG, nixon frecuencia cardíaca y nixon presión arterial.  · Se le podría pedir que utilice números para indicar la dificultad del ejercicio que está haciendo. Cuanto mayor sea el [place], más difícil es el ejercicio que piensa usted que [place]. · La prueba continuará hasta que:  ? Necesite detenerse. ? Jae Nigh nixon frecuencia Trinna Dancer. La frecuencia cardíaca es el número de veces que late el corazón en un minuto. La frecuencia cardíaca máxima es lo más rápido que puede latir nixon corazón cuando está haciendo ejercicio a la mayor intensidad posible. La frecuencia cardíaca máxima cambia a medida que envejece. ? Tenga síntomas de angina, tales katrin dolor o presión en el pecho.  ? Esté muy cansado o le falte mucho el aire.   ? Nixon médico considere que usted necesita detenerse debido a un cambio en nixon ritmo cardíaco o en nixon presión arterial.  ¿Qué más debería saber usted acerca de la prueba? · No pasa electricidad a través de nixon cuerpo lacy la prueba. No hay peligro de recibir gavin descarga eléctrica. · Dígale a nixon médico lacy la prueba si:  ? Tiene dolor de pecho. ? Trenda Kemps [employer]. ? Se siente aturdido. ? Tiene otros síntomas. ¿Cuánto tiempo dura la prueba? · La prueba suele durar de 15 a 30 minutos. ¿Qué ocurre después de la prueba? · Podrá sentarse o recostarse y descansar. · Le revisarán el ECG y la presión arterial lacy aproximadamente 5 a 10 minutos. ¿Cuándo debe pedir ayuda? Llame al 911 en cualquier momento que considere que necesita atención de [place]. Por ejemplo, llame si:  · Se desmayó (perdió el conocimiento). · Le juares diagnosticado angina y tiene síntomas de angina que no desaparecen con reposo o no mejoran a los 5 minutos de astrid tomado gavin dosis de nitroglicerina. · Tiene síntomas de un ataque al corazón. Estos pueden incluir:  ? Omar Maple en el pecho, o gavin sensación extraña en el pecho.  ? Sudoración. ? Falta de aire. ? Náuseas o vómito. ? Dolor, presión o gavin sensación extraña en la espalda, el estrella, la mandíbula o la parte superior del abdomen, o en merissa o ambos hombros o brazos. ? Aturdimiento o debilidad repentina. ? Pulso rápido o irregular. Después de que llame al 911, es posible que el operador le diga que mastique 1 aspirina para adultos o de 2 a 4 aspirinas de dosis baja. Espere la ambulancia. No trate de conducir por sí mismo. Preste especial atención a los cambios en nixon amy y asegúrese de comunicarse con nixon médico si tiene algún problema. La atención de seguimiento es gavin parte clave de nixon tratamiento y seguridad. Asegúrese de hacer y acudir a todas las citas, y llame a nixon médico si está teniendo problemas. También es gavin buena idea mantener gavin lista de los medicamentos que alesia. Pregúntele a nixon médico cuándo espera [employer] de la prueba. ¿Dónde puede encontrar más información en inglés? Jenna Haines a [email or web address] Rosa Castaneda V268 en la búsqueda para aprender más acerca de \"Electrocardiograma de esfuerzo: Acerca de esta prueba - [ Exercise Electrocardiogram: About This Test ]. \"  Revisado: 22 julio, 2018  Versión del contenido: 11.9  © 5249-8356 Healthwise, Incorporated. Las instrucciones de cuidado fueron adaptadas bajo licencia por [employer] (which disclaims liability or warranty for this information). Si usted tiene [place] afección médica o sobre estas instrucciones, siempre pregunte a nixon profesional de amy. Healthwise, Incorporated niega toda garantía o responsabilidad por nixon uso de esta información.

## 2019-03-25 NOTE — PROGRESS NOTES
Ruchi Garg [address], [address]  [address]  [place], [address] [facility]    Office [phone]  Fax [phone]  Cell [phone]    Carolina Landa is a 62 y.o. male referred by the ED for evaluation of chest pain    Assessment  Encounter Diagnosis   Name Primary?  Chest pain in adult Yes       Recommendations:  Intermittent spells of left-sided chest pain with and without activity. Exam and EKG are normal. He is at intermediate CAD risk based on his age and family hx. WiIll evaluate further with ETT. Phone follow up after reviewing tests      Subjective:  Mr. Cristian Spencer reports sharp, central chest pain, which can occur with or without activity. He has no history of HTN or DM. He does not smoke cigarettes. 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 He has no consistent exertional symptoms with routine activities. He has no chest wall tenderness to palpation. Patient denies any dyspnea, palpitations, syncope, orthopnea, edema or paroxysmal nocturnal dyspnea. He states his brother is Petrona Chambers trouble with his heart\". The patient is Chinese-speaking only. Interpretation provided by  over the phone,  number is #[id number]. Cardiac risk factors   HTN no  DM no  Smoking no    Cardiac testing  No specialty comments available. Past Medical History:   Diagnosis Date    BPH (benign prostatic hyperplasia)         Current Outpatient Medications   Medication Sig Dispense Refill    clotrimazole-betamethasone (LOTRISONE) topical cream Apply twice a day to affected area of fingers x 2 weeks 45 g 0    Omeprazole delayed release (PRILOSEC D/R) 20 mg tablet Take 1 Tab by mouth Daily (before breakfast). [place] 1 tableta gavin vez al [place] antes de desayuno 90 Tab 1    glycerin-mineral oil-lanolin (EUCERIN PLUS) topical cream Apply  to affected area as needed for Dry Skin.  1 Tube 0       No Known Allergies Review of Systems  Constitutional: Negative for fever, chills, and diaphoresis. Respiratory: Negative for cough, hemoptysis, sputum production, shortness of breath and wheezing. Cardiovascular: Negative for palpitations, orthopnea, claudication, leg swelling and PND. +chest pain, exertional fatigue  Gastrointestinal: Negative for heartburn, nausea, vomiting, blood in stool and melena. Genitourinary: Negative for dysuria and flank pain. Musculoskeletal: Negative for joint pain and back pain. Skin: Negative for rash. Neurological: Negative for focal weakness, seizures, loss of consciousness, weakness and headaches. Endo/Heme/Allergies: Does not bruise/bleed easily. Psychiatric/Behavioral: Negative for memory loss. The patient does not have insomnia. Physical Exam  Visit Vitals  /60   Pulse 60   Resp 20   Ht 5' 8\" (1.727 m)   Wt 135 lb (61.2 kg)   SpO2 98%   BMI 20.53 kg/m²     Wt Readings from Last 3 Encounters:   03/25/19 135 lb (61.2 kg)   03/03/19 142 lb (64.4 kg)   12/01/18 134 lb (60.8 kg)      General - well developed well nourished  Neck - JVP normal, thyroid nl  Cardiac - normal S1, S2, no murmurs, rubs or gallops. No clicks  Vascular - carotids without bruits, radials, femorals and pedal pulses equal bilateral  Lungs - clear to auscultation bilaterals, no rales, wheezing or rhonchi  Abd - soft nontender, no HSM, no abd bruits  Extremities - no edema  Skin - no rash  Neuro - nonfocal  Psych - normal mood and affect      Cardiographics  EKG 3/3/19 - SR, normal EKG      Written by Ayaka Ambriz, as dictated by Dr. Edward Eugene.    Edward Eugene MD

## 2019-04-19 ENCOUNTER — APPOINTMENT (OUTPATIENT)
Dept: CT IMAGING | Age: 59
End: 2019-04-19
Attending: EMERGENCY MEDICINE
Payer: SUBSIDIZED

## 2019-04-19 ENCOUNTER — HOSPITAL ENCOUNTER (EMERGENCY)
Age: 59
Discharge: HOME | End: 2019-04-19
Attending: EMERGENCY MEDICINE
Payer: SUBSIDIZED

## 2019-04-19 VITALS
WEIGHT: 135 LBS | SYSTOLIC BLOOD PRESSURE: 135 MMHG | RESPIRATION RATE: 20 BRPM | BODY MASS INDEX: 20.46 KG/M2 | HEART RATE: 70 BPM | OXYGEN SATURATION: 100 % | HEIGHT: 68 IN | TEMPERATURE: 98 F | DIASTOLIC BLOOD PRESSURE: 83 MMHG

## 2019-04-19 DIAGNOSIS — G43.009 MIGRAINE WITHOUT AURA AND WITHOUT STATUS MIGRAINOSUS, NOT INTRACTABLE: Primary | ICD-10-CM

## 2019-04-19 PROCEDURE — 96374 THER/PROPH/DIAG INJ IV PUSH: CPT

## 2019-04-19 PROCEDURE — 99283 EMERGENCY DEPT VISIT LOW MDM: CPT

## 2019-04-19 PROCEDURE — 96375 TX/PRO/DX INJ NEW DRUG ADDON: CPT

## 2019-04-19 PROCEDURE — 93005 ELECTROCARDIOGRAM TRACING: CPT

## 2019-04-19 PROCEDURE — 96361 HYDRATE IV INFUSION ADD-ON: CPT

## 2019-04-19 PROCEDURE — 70450 CT HEAD/BRAIN W/O DYE: CPT

## 2019-04-19 PROCEDURE — 74011250636 HC RX REV CODE- 250/636: Performed by: EMERGENCY MEDICINE

## 2019-04-19 RX ORDER — KETOROLAC TROMETHAMINE 30 MG/ML
15 INJECTION, SOLUTION INTRAMUSCULAR; INTRAVENOUS
Status: COMPLETED | OUTPATIENT
Start: 2019-04-19 | End: 2019-04-19

## 2019-04-19 RX ORDER — DIPHENHYDRAMINE HYDROCHLORIDE 50 MG/ML
25 INJECTION, SOLUTION INTRAMUSCULAR; INTRAVENOUS
Status: COMPLETED | OUTPATIENT
Start: 2019-04-19 | End: 2019-04-19

## 2019-04-19 RX ORDER — PROCHLORPERAZINE EDISYLATE 5 MG/ML
10 INJECTION INTRAMUSCULAR; INTRAVENOUS
Status: COMPLETED | OUTPATIENT
Start: 2019-04-19 | End: 2019-04-19

## 2019-04-19 RX ADMIN — PROCHLORPERAZINE EDISYLATE 10 MG: 5 INJECTION INTRAMUSCULAR; INTRAVENOUS at 17:29

## 2019-04-19 RX ADMIN — KETOROLAC TROMETHAMINE 15 MG: 30 INJECTION, SOLUTION INTRAMUSCULAR at 17:29

## 2019-04-19 RX ADMIN — DIPHENHYDRAMINE HYDROCHLORIDE 25 MG: 50 INJECTION, SOLUTION INTRAMUSCULAR; INTRAVENOUS at 17:28

## 2019-04-19 RX ADMIN — SODIUM CHLORIDE 1000 ML: 900 INJECTION, SOLUTION INTRAVENOUS at 17:28

## 2019-04-19 NOTE — ED PROVIDER NOTES
I have evaluated the patient as the Provider in Triage. I have reviewed His vital signs and the triage nurse assessment. I have talked with the patient and any available family and advised that I am the provider in triage and have ordered the appropriate study to initiate their work up based on the clinical presentation during my assessment. I have advised that the patient will be accommodated in the Main ED as soon as possible. I have also requested to contact the triage nurse or myself immediately if the patient experiences any changes in their condition during this brief waiting period. 62 y.o. male with past medical history significant for BPH who presents from home with chief complaint of dizziness. Pt complains of dizziness with associated generalized headache, blurred vision, and fatigue that started yesterday while at work and has continued today. Pt states he recently started taking herbal supplements. Pt states he had a similar headache about 5-6 years ago. Pt denies numbness, head injury, or hx of migraines. Pt notes he was seen in the ED about a month ago and was referred to a cardiologist, but has not had an appointment yet. There are no other acute medical concerns at this time. Social hx: Never Smoker. Denies EtOH Use. PCP: None Note written by Shila Li. Prieto Jeong, as dictated by Loan Pritchett, DO 3:12 PM 
 
 
 
The history is provided by the patient. The history is limited by a language barrier (Kinyarwanda. ). A  was used (Inivata. ). Past Medical History:  
Diagnosis Date  BPH (benign prostatic hyperplasia) No past surgical history on file. Family History:  
Problem Relation Age of Onset  Parkinsonism Mother  Arthritis-osteo Father  Diabetes Brother  Heart Disease Brother  Heart Surgery Brother Social History Socioeconomic History  Marital status: SINGLE Spouse name: Not on file  Number of children: Not on file  Years of education: Not on file  Highest education level: Not on file Occupational History  Not on file Social Needs  Financial resource strain: Not on file  Food insecurity:  
  Worry: Not on file Inability: Not on file  Transportation needs:  
  Medical: Not on file Non-medical: Not on file Tobacco Use  Smoking status: Never Smoker  Smokeless tobacco: Never Used Substance and Sexual Activity  Alcohol use: No  
  Alcohol/week: 0.0 oz  Drug use: No  
 Sexual activity: Yes  
  Partners: Female Lifestyle  Physical activity:  
  Days per week: Not on file Minutes per session: Not on file  Stress: Not on file Relationships  Social connections:  
  Talks on phone: Not on file Gets together: Not on file Attends Synagogue service: Not on file Active member of club or organization: Not on file Attends meetings of clubs or organizations: Not on file Relationship status: Not on file  Intimate partner violence:  
  Fear of current or ex partner: Not on file Emotionally abused: Not on file Physically abused: Not on file Forced sexual activity: Not on file Other Topics Concern  Not on file Social History Narrative ** Merged History Encounter **  
    
 ** Merged History Encounter ** ALLERGIES: Patient has no known allergies. Review of Systems Constitutional: Positive for fatigue. Negative for activity change, appetite change, chills and fever. HENT: Negative for congestion, rhinorrhea, sinus pain, sneezing and sore throat. Eyes: Positive for visual disturbance. Negative for photophobia. Respiratory: Negative for cough and shortness of breath. Cardiovascular: Negative for chest pain. Gastrointestinal: Negative for abdominal pain, blood in stool, constipation, diarrhea, nausea and vomiting. Genitourinary: Negative for difficulty urinating, dysuria, flank pain, hematuria, penile pain and testicular pain. Musculoskeletal: Negative for arthralgias, back pain, myalgias and neck pain. Skin: Negative for rash and wound. Neurological: Positive for dizziness and headaches. Negative for syncope, weakness, light-headedness and numbness. Psychiatric/Behavioral: Negative for self-injury and suicidal ideas. All other systems reviewed and are negative. Vitals:  
 04/19/19 1517 BP: 135/83 Pulse: 70 Resp: 20 Temp: 98 °F (36.7 °C) SpO2: 100% Weight: 61.2 kg (135 lb) Height: 5' 8\" (1.727 m) Physical Exam  
Constitutional: He is oriented to person, place, and time. He appears well-developed and well-nourished. No distress. HENT:  
Head: Normocephalic and atraumatic. Eyes: Pupils are equal, round, and reactive to light. Conjunctivae and EOM are normal.  
Neck: Neck supple. Cardiovascular: Normal rate, regular rhythm and normal heart sounds. Pulmonary/Chest: Effort normal and breath sounds normal.  
Abdominal: Soft. He exhibits no distension. There is no tenderness. Musculoskeletal: He exhibits no edema or tenderness. Neurological: He is alert and oriented to person, place, and time. He has normal strength. No cranial nerve deficit or sensory deficit. Gait normal.  
5/5 strength in 4/4 extremities. Intact sensation. Fluent speech. Intact finger to nose. Skin: Skin is warm and dry. He is not diaphoretic. Nursing note and vitals reviewed. Note written by Shiela Carpenter. Simone Nicolas, as dictated by Jonah Lanier, DO 3:23 PM 
 
 
MDM 
  62 y.o. male presents with persistent headache with some migraine characteristics. Neuro intact. CT head done and showed no acute intracranial abnormalities. Given migraine cocktail and he had resolution of his headache and vertigo sx. Feel that likely due to migraine headache.  Rec'd neurology f/u and return precautions were given for worsening or concerns. Procedures

## 2019-04-19 NOTE — ED TRIAGE NOTES
Using Indian interpretor: [id number]: \"Yesterday while I was at work 9 AM feeling dizzy all day and then later on a headache. .. Still feeling dizzy and headache, tired today\".

## 2019-04-19 NOTE — ED NOTES
Patient relays that his headache is relived to Dr. Ortez Settler at this time. Awake, Alert, no distress noted. IV fluids continue to infuse, will discharge after completion.

## 2019-04-22 LAB
ATRIAL RATE: 62 BPM
CALCULATED P AXIS, ECG09: 66 DEGREES
CALCULATED R AXIS, ECG10: 64 DEGREES
CALCULATED T AXIS, ECG11: 63 DEGREES
DIAGNOSIS, 93000: NORMAL
P-R INTERVAL, ECG05: 136 MS
Q-T INTERVAL, ECG07: 430 MS
QRS DURATION, ECG06: 90 MS
QTC CALCULATION (BEZET), ECG08: 436 MS
VENTRICULAR RATE, ECG03: 62 BPM

## 2019-04-23 ENCOUNTER — TELEPHONE (OUTPATIENT)
Dept: CARDIOLOGY CLINIC | Age: 59
End: 2019-04-23

## 2019-04-23 NOTE — TELEPHONE ENCOUNTER
----- Message from Nishant Kumar NP sent at 4/15/2019  9:21 AM EDT -----  Regarding: RE: normal stress test  Could you facilitate communicating these normal results to Mr. Vance Matthews. Not sure how to use the  phone over the phone. ? Francisco Javier Christine, I have sent this to Shrutikaren Bundyarmando also.        ----- Message -----  From: Azalia Barbosa MD  Sent: 4/11/2019  10:48 PM  To: Alaina Hooks MD, Nishant Kumar NP  Subject: normal stress test                               Caron Crawford - patient's stress test was normal.  Stress nurse's note said that \"Will need  to give results\". Can you please get patient normal result.      Thanks,  Lamiecco

## 2019-07-28 ENCOUNTER — HOSPITAL ENCOUNTER (EMERGENCY)
Age: 59
Discharge: HOME | End: 2019-07-28
Attending: EMERGENCY MEDICINE | Admitting: EMERGENCY MEDICINE
Payer: SUBSIDIZED

## 2019-07-28 ENCOUNTER — APPOINTMENT (OUTPATIENT)
Dept: CT IMAGING | Age: 59
End: 2019-07-28
Attending: EMERGENCY MEDICINE
Payer: SUBSIDIZED

## 2019-07-28 VITALS
HEIGHT: 68 IN | WEIGHT: 140 LBS | BODY MASS INDEX: 21.22 KG/M2 | DIASTOLIC BLOOD PRESSURE: 96 MMHG | OXYGEN SATURATION: 100 % | SYSTOLIC BLOOD PRESSURE: 134 MMHG | HEART RATE: 64 BPM | RESPIRATION RATE: 14 BRPM | TEMPERATURE: 98.2 F

## 2019-07-28 DIAGNOSIS — R52 BODY ACHES: ICD-10-CM

## 2019-07-28 DIAGNOSIS — R13.13 PHARYNGEAL DYSPHAGIA: ICD-10-CM

## 2019-07-28 DIAGNOSIS — R19.06 EPIGASTRIC FULLNESS: ICD-10-CM

## 2019-07-28 DIAGNOSIS — M54.16 LUMBAR RADICULOPATHY: Primary | ICD-10-CM

## 2019-07-28 LAB
ALBUMIN SERPL-MCNC: 3.5 G/DL (ref 3.5–5)
ALBUMIN/GLOB SERPL: 1.2 {RATIO} (ref 1.1–2.2)
ALP SERPL-CCNC: 87 U/L (ref 45–117)
ALT SERPL-CCNC: 30 U/L (ref 12–78)
ANION GAP SERPL CALC-SCNC: 7 MMOL/L (ref 5–15)
APPEARANCE UR: CLEAR
AST SERPL-CCNC: 30 U/L (ref 15–37)
BACTERIA URNS QL MICRO: NEGATIVE /HPF
BASOPHILS # BLD: 0 K/UL (ref 0–0.1)
BASOPHILS NFR BLD: 1 % (ref 0–1)
BILIRUB SERPL-MCNC: 0.5 MG/DL (ref 0.2–1)
BILIRUB UR QL: NEGATIVE
BUN SERPL-MCNC: 12 MG/DL (ref 6–20)
BUN/CREAT SERPL: 10 (ref 12–20)
CALCIUM SERPL-MCNC: 8.1 MG/DL (ref 8.5–10.1)
CHLORIDE SERPL-SCNC: 104 MMOL/L (ref 97–108)
CO2 SERPL-SCNC: 26 MMOL/L (ref 21–32)
COLOR UR: NORMAL
COMMENT, HOLDF: NORMAL
CREAT SERPL-MCNC: 1.16 MG/DL (ref 0.7–1.3)
DIFFERENTIAL METHOD BLD: ABNORMAL
EOSINOPHIL # BLD: 0.3 K/UL (ref 0–0.4)
EOSINOPHIL NFR BLD: 5 % (ref 0–7)
EPITH CASTS URNS QL MICRO: NORMAL /LPF
ERYTHROCYTE [DISTWIDTH] IN BLOOD BY AUTOMATED COUNT: 12.7 % (ref 11.5–14.5)
GLOBULIN SER CALC-MCNC: 2.9 G/DL (ref 2–4)
GLUCOSE BLD STRIP.AUTO-MCNC: 111 MG/DL (ref 65–100)
GLUCOSE SERPL-MCNC: 108 MG/DL (ref 65–100)
GLUCOSE UR STRIP.AUTO-MCNC: NEGATIVE MG/DL
HCT VFR BLD AUTO: 36.2 % (ref 36.6–50.3)
HGB BLD-MCNC: 12.5 G/DL (ref 12.1–17)
HGB UR QL STRIP: NEGATIVE
IMM GRANULOCYTES # BLD AUTO: 0 K/UL (ref 0–0.04)
IMM GRANULOCYTES NFR BLD AUTO: 0 % (ref 0–0.5)
KETONES UR QL STRIP.AUTO: NEGATIVE MG/DL
LEUKOCYTE ESTERASE UR QL STRIP.AUTO: NEGATIVE
LYMPHOCYTES # BLD: 1.8 K/UL (ref 0.8–3.5)
LYMPHOCYTES NFR BLD: 32 % (ref 12–49)
MCH RBC QN AUTO: 30.4 PG (ref 26–34)
MCHC RBC AUTO-ENTMCNC: 34.5 G/DL (ref 30–36.5)
MCV RBC AUTO: 88.1 FL (ref 80–99)
MONOCYTES # BLD: 0.4 K/UL (ref 0–1)
MONOCYTES NFR BLD: 8 % (ref 5–13)
NEUTS SEG # BLD: 3.1 K/UL (ref 1.8–8)
NEUTS SEG NFR BLD: 54 % (ref 32–75)
NITRITE UR QL STRIP.AUTO: NEGATIVE
NRBC # BLD: 0 K/UL (ref 0–0.01)
NRBC BLD-RTO: 0 PER 100 WBC
PH UR STRIP: 6 [PH] (ref 5–8)
PLATELET # BLD AUTO: 201 K/UL (ref 150–400)
PMV BLD AUTO: 10 FL (ref 8.9–12.9)
POTASSIUM SERPL-SCNC: 3.8 MMOL/L (ref 3.5–5.1)
PROT SERPL-MCNC: 6.4 G/DL (ref 6.4–8.2)
PROT UR STRIP-MCNC: NEGATIVE MG/DL
RBC # BLD AUTO: 4.11 M/UL (ref 4.1–5.7)
RBC #/AREA URNS HPF: NORMAL /HPF (ref 0–5)
SAMPLES BEING HELD,HOLD: NORMAL
SERVICE CMNT-IMP: ABNORMAL
SODIUM SERPL-SCNC: 137 MMOL/L (ref 136–145)
SP GR UR REFRACTOMETRY: <1.005 (ref 1–1.03)
UR CULT HOLD, URHOLD: NORMAL
UROBILINOGEN UR QL STRIP.AUTO: 0.2 EU/DL (ref 0.2–1)
WBC # BLD AUTO: 5.7 K/UL (ref 4.1–11.1)
WBC URNS QL MICRO: NORMAL /HPF (ref 0–4)

## 2019-07-28 PROCEDURE — 74011250637 HC RX REV CODE- 250/637: Performed by: EMERGENCY MEDICINE

## 2019-07-28 PROCEDURE — 74011250636 HC RX REV CODE- 250/636: Performed by: EMERGENCY MEDICINE

## 2019-07-28 PROCEDURE — 99284 EMERGENCY DEPT VISIT MOD MDM: CPT

## 2019-07-28 PROCEDURE — 96375 TX/PRO/DX INJ NEW DRUG ADDON: CPT

## 2019-07-28 PROCEDURE — 80053 COMPREHEN METABOLIC PANEL: CPT

## 2019-07-28 PROCEDURE — 96374 THER/PROPH/DIAG INJ IV PUSH: CPT

## 2019-07-28 PROCEDURE — 72131 CT LUMBAR SPINE W/O DYE: CPT

## 2019-07-28 PROCEDURE — 82962 GLUCOSE BLOOD TEST: CPT

## 2019-07-28 PROCEDURE — 36415 COLL VENOUS BLD VENIPUNCTURE: CPT

## 2019-07-28 PROCEDURE — 81001 URINALYSIS AUTO W/SCOPE: CPT

## 2019-07-28 PROCEDURE — 85025 COMPLETE CBC W/AUTO DIFF WBC: CPT

## 2019-07-28 RX ORDER — CYCLOBENZAPRINE HCL 10 MG
10 TABLET ORAL
Qty: 20 TAB | Refills: 0 | Status: SHIPPED | OUTPATIENT
Start: 2019-07-28 | End: 2020-01-29

## 2019-07-28 RX ORDER — SODIUM CHLORIDE 0.9 % (FLUSH) 0.9 %
5-40 SYRINGE (ML) INJECTION AS NEEDED
Status: DISCONTINUED | OUTPATIENT
Start: 2019-07-28 | End: 2019-07-29 | Stop reason: HOSPADM

## 2019-07-28 RX ORDER — KETOROLAC TROMETHAMINE 30 MG/ML
30 INJECTION, SOLUTION INTRAMUSCULAR; INTRAVENOUS
Status: COMPLETED | OUTPATIENT
Start: 2019-07-28 | End: 2019-07-28

## 2019-07-28 RX ORDER — SODIUM CHLORIDE 0.9 % (FLUSH) 0.9 %
5-40 SYRINGE (ML) INJECTION EVERY 8 HOURS
Status: DISCONTINUED | OUTPATIENT
Start: 2019-07-28 | End: 2019-07-29 | Stop reason: HOSPADM

## 2019-07-28 RX ORDER — CYCLOBENZAPRINE HCL 10 MG
10 TABLET ORAL
Status: COMPLETED | OUTPATIENT
Start: 2019-07-28 | End: 2019-07-28

## 2019-07-28 RX ORDER — PREDNISONE 10 MG/1
TABLET ORAL
Qty: 21 TAB | Refills: 0 | Status: SHIPPED | OUTPATIENT
Start: 2019-07-28 | End: 2020-01-29

## 2019-07-28 RX ORDER — DEXAMETHASONE SODIUM PHOSPHATE 10 MG/ML
10 INJECTION INTRAMUSCULAR; INTRAVENOUS
Status: COMPLETED | OUTPATIENT
Start: 2019-07-28 | End: 2019-07-28

## 2019-07-28 RX ADMIN — CYCLOBENZAPRINE HYDROCHLORIDE 10 MG: 10 TABLET, FILM COATED ORAL at 21:08

## 2019-07-28 RX ADMIN — KETOROLAC TROMETHAMINE 30 MG: 30 INJECTION, SOLUTION INTRAMUSCULAR at 21:05

## 2019-07-28 RX ADMIN — DEXAMETHASONE SODIUM PHOSPHATE 10 MG: 10 INJECTION, SOLUTION INTRAMUSCULAR; INTRAVENOUS at 21:09

## 2019-07-29 NOTE — DISCHARGE INSTRUCTIONS
We hope that we have addressed all of your medical concerns. The examination and treatment you received in the Emergency Department were for an emergent problem and were not intended as complete care. It is important that you follow up with your healthcare provider(s) for ongoing care. If your symptoms worsen or do not improve as expected, and you are unable to reach your usual health care provider(s), you should return to the Emergency Department. Today's healthcare is undergoing tremendous change, and patient satisfaction surveys are one of the many tools to assess the quality of medical care. You may receive a survey from the [employer] organization regarding your experience in the Emergency Department. I hope that your experience has been completely positive, particularly the medical care that I provided. As such, please participate in the survey; anything less than excellent does not meet my expectations or intentions. [facility] and [facility] participate in nationally recognized quality of care measures. If your blood pressure is greater than 120/80, as reported below, we urge that you seek medical care to address the potential of high blood pressure, commonly known as hypertension. Hypertension can be hereditary or can be caused by certain medical conditions, pain, stress, or \"white coat syndrome. \"       Please make an appointment with your health care provider(s) for follow up of your Emergency Department visit. VITALS:   Patient Vitals for the past 8 hrs:   Temp Pulse Resp BP SpO2   07/28/19 2032 98.2 °F (36.8 °C) 64 14 141/73 98 %          Thank you for allowing us to provide you with medical care today. We realize that you have many choices for your emergency care needs. Please choose us in the future for any continued health care needs. Yfn Acosta  [facility], [address] [place] [address] [address] Office: [phone]            Recent Results (from the past 24 hour(s))   GLUCOSE, POC    Collection Time: 07/28/19  9:04 PM   Result Value Ref Range    Glucose (POC) 111 (H) 65 - 100 mg/dL    Performed by Ravindra Matias    CBC WITH AUTOMATED DIFF    Collection Time: 07/28/19  9:06 PM   Result Value Ref Range    WBC 5.7 4.1 - 11.1 K/uL    RBC 4.11 4.10 - 5.70 M/uL    HGB 12.5 12.1 - 17.0 g/dL    HCT 36.2 (L) 36.6 - 50.3 %    MCV 88.1 80.0 - 99.0 FL    MCH 30.4 26.0 - 34.0 PG    MCHC 34.5 30.0 - 36.5 g/dL    RDW 12.7 11.5 - 14.5 %    PLATELET 577 202 - 859 K/uL    MPV 10.0 8.9 - 12.9 FL    NRBC 0.0 0  WBC    ABSOLUTE NRBC 0.00 0.00 - 0.01 K/uL    NEUTROPHILS 54 32 - 75 %    LYMPHOCYTES 32 12 - 49 %    MONOCYTES 8 5 - 13 %    EOSINOPHILS 5 0 - 7 %    BASOPHILS 1 0 - 1 %    IMMATURE GRANULOCYTES 0 0.0 - 0.5 %    ABS. NEUTROPHILS 3.1 1.8 - 8.0 K/UL    ABS. LYMPHOCYTES 1.8 0.8 - 3.5 K/UL    ABS. MONOCYTES 0.4 0.0 - 1.0 K/UL    ABS. EOSINOPHILS 0.3 0.0 - 0.4 K/UL    ABS. BASOPHILS 0.0 0.0 - 0.1 K/UL    ABS. IMM. GRANS. 0.0 0.00 - 0.04 K/UL    DF AUTOMATED     METABOLIC PANEL, COMPREHENSIVE    Collection Time: 07/28/19  9:06 PM   Result Value Ref Range    Sodium 137 136 - 145 mmol/L    Potassium 3.8 3.5 - 5.1 mmol/L    Chloride 104 97 - 108 mmol/L    CO2 26 21 - 32 mmol/L    Anion gap 7 5 - 15 mmol/L    Glucose 108 (H) 65 - 100 mg/dL    BUN 12 6 - 20 MG/DL    Creatinine 1.16 0.70 - 1.30 MG/DL    BUN/Creatinine ratio 10 (L) 12 - 20      GFR est AA >60 >60 ml/min/1.73m2    GFR est non-AA >60 >60 ml/min/1.73m2    Calcium 8.1 (L) 8.5 - 10.1 MG/DL    Bilirubin, total 0.5 0.2 - 1.0 MG/DL    ALT (SGPT) 30 12 - 78 U/L    AST (SGOT) 30 15 - 37 U/L    Alk.  phosphatase 87 45 - 117 U/L    Protein, total 6.4 6.4 - 8.2 g/dL    Albumin 3.5 3.5 - 5.0 g/dL    Globulin 2.9 2.0 - 4.0 g/dL    A-G Ratio 1.2 1.1 - 2.2     SAMPLES BEING HELD    Collection Time: 07/28/19  9:06 PM   Result Value Ref Range    SAMPLES BEING HELD SST,BLUE,GREEN,RED     COMMENT        Add-on orders for these samples will be processed based on acceptable specimen integrity and analyte stability, which may vary by analyte. URINALYSIS W/MICROSCOPIC    Collection Time: 07/28/19  9:33 PM   Result Value Ref Range    Color YELLOW/STRAW      Appearance CLEAR CLEAR      Specific gravity <1.005 1.003 - 1.030    pH (UA) 6.0 5.0 - 8.0      Protein NEGATIVE  NEG mg/dL    Glucose NEGATIVE  NEG mg/dL    Ketone NEGATIVE  NEG mg/dL    Bilirubin NEGATIVE  NEG      Blood NEGATIVE  NEG      Urobilinogen 0.2 0.2 - 1.0 EU/dL    Nitrites NEGATIVE  NEG      Leukocyte Esterase NEGATIVE  NEG      WBC 0-4 0 - 4 /hpf    RBC 0-5 0 - 5 /hpf    Epithelial cells FEW FEW /lpf    Bacteria NEGATIVE  NEG /hpf   URINE CULTURE HOLD SAMPLE    Collection Time: 07/28/19  9:33 PM   Result Value Ref Range    Urine culture hold        URINE ON HOLD IN MICROBIOLOGY DEPT FOR 3 DAYS. IF UNPRESERVED URINE IS SUBMITTED, IT CANNOT BE USED FOR ADDITIONAL TESTING AFTER 24 HRS, RECOLLECTION WILL BE REQUIRED. Ct Spine Lumb Wo Cont    Result Date: 7/28/2019  EXAM: CT SPINE LUMB WO CONT INDICATION: back/leg pain COMPARISON: None. TECHNIQUE:   Unenhanced multislice helical CT of the lumbar spine was performed in the axial plane. Coronal and sagittal reconstructions were obtained. CT dose reduction was achieved through use of a standardized protocol tailored for this examination and automatic exposure control for dose modulation. FINDINGS: Incidental imaging of the paraspinal soft tissues and abdomen is unremarkable. Minimal anterior osteophytes. Degenerative change in the posterior elements at L5/S1 on the left. Slight loss of disc space height on the right at L4/L5. Mild, neuro foraminal stenosis at L5/S1 bilaterally. No visible fracture or subluxation. IMPRESSION: 1. No visible fracture or subluxation        Patient Education        Dolor de espalda:  Instrucciones de cuidado - [ Back Pain: Care Instructions ]  Instrucciones de cuidado    El dolor de espalda tiene muchas causas posibles. Con frecuencia, está relacionado con problemas en los músculos y ligamentos de la espalda. También podría estar relacionado con problemas de los nervios, discos o huesos de la espalda. Moverse, levantar algo, ponerse de pie, sentarse o dormir de [employer] incómoda pueden forzar la espalda. Algunas veces no se da cuenta de que tiene gavin lesión [employer] tarde. La artritis es otra causa común del dolor de espalda. Aunque guilherme vez duela mucho, el dolor de espalda por lo general mejora por sí mismo en varias semanas. [address] personas se recuperan en 12 semanas o menos. Hacer un buen tratamiento en el hogar y tener cuidado de no forzar la espalda puede ayudar a que se sienta mejor más pronto. La atención de seguimiento es gavin parte clave de nixon tratamiento y seguridad. Asegúrese de hacer y acudir a todas las citas, y llame a nixon médico si está teniendo problemas. También es gavin buena idea saber los resultados de amena exámenes y mantener gavin lista de los medicamentos que alesia. ¿Cómo puede cuidarse en el hogar? · Siéntese o acuéstese en las posiciones más cómodas y que reduzcan el dolor. Trate gavin de estas posiciones al Penelope Laboy:  ? Acuéstese boca arriba con las rodillas dobladas y apoyadas sobre [address]. ? Acuéstese en el piso con las piernas sobre el asiento de un sofá o gavin silla. ? Acuéstese de lado con las rodillas y caderas dobladas y Luellen Lands almohada entre las piernas. ? Acuéstese boca abajo siempre que esta posición no empeore el dolor. · No se siente en la cama y evite los sofás blandos y las posiciones torcidas. El reposo en cama puede aliviar el dolor al principio, oksana retrasa la sanación. Evite reposar en la cama después del primer día de dolor de espalda. · Cambie de posición cada 30 minutos. Si debe sentarse por [employer], párese y descanse de estar sentado.  Levántese y camine, o acuéstese en gavin posición cómoda. · Pruebe usar gavin almohadilla térmica a temperatura baja o mediana lacy 15 a 20 minutos cada 2 ó 3 horas. Pruebe gavin ducha tibia en vez de gavin sesión con la almohadilla térmica. · También puede probar gavin compresa de hielo lacy 10 a 15 minutos cada 2 a 3 horas. Póngase un paño alberts entre la compresa de hielo y la piel. · [employer] analgésicos (medicamentos para el dolor) exactamente según las indicaciones. ? Si el médico le recetó un analgésico, tómelo según las indicaciones. ? Si no está tomando un analgésico recetado, pregúntele a nixon médico si puede dhiraj merissa de [employer]. · Edelmira caminatas cortas varias veces al día. Usted puede comenzar con 5 a 10 minutos, 3 ó 4 veces al día, y aumentar progresivamente hasta lograr caminatas más largas. Camine en superficies patrice y evite colinas y escaleras hasta que la espalda esté mejor. · Regrese al Rubbie Majors y otras actividades lo más pronto posible. El descanso continuo sin actividad por lo general no es dia para la espalda. · Para prevenir el dolor de espalda en el futuro, edelmira ejercicios para estirar y fortalecer la espalda y el abdomen. Aprenda a [place], técnicas seguras para levantar peso y la mecánica corporal apropiada. ¿Cuándo debe pedir ayuda? Llame a nixon médico ahora mismo o busque atención médica inmediata si:    · Tiene un entumecimiento nuevo o peor en las piernas.     · Tiene debilidad nueva o peor en las piernas. ([place] puede hacer que le resulte difícil ponerse de pie).   · Pierde el control de la vejiga o los intestinos.    Preste especial atención a los cambios en nixon amy y asegúrese de comunicarse con nixon médico si:    · Tiene fiebre, pierde peso o no se siente smiley.     · No mejora katrin se esperaba. ¿Dónde puede encontrar más información en inglés? Frederick Tenorio a http://stephanie-jose eduardo.info/. Denece [facility] L404 en la búsqueda para aprender Jimmy Santamaria de \"Dolor de espalda:  Instrucciones de cuidado - [ Back Pain: Care Instructions ]. \"  Revisado: 20 septiembre, 2018  Versión del contenido: 12.1  © 1435-8819 Healthwise, Incorporated. Las instrucciones de cuidado fueron adaptadas bajo licencia por [employer] Help [employer] (which disclaims liability or warranty for this information). Si usted tiene [place] afección médica o sobre estas instrucciones, siempre pregunte a nixon profesional de amy. Healthwise, Incorporated niega toda garantía o responsabilidad por nixon uso de esta información. Patient Education        Dolor en la parte baja de la espalda (lumbalgia): Ejercicios - [ Low Back Pain: Exercises ]  Instrucciones de cuidado  Aquí se presentan algunos ejemplos de ejercicios típicos de rehabilitación para tratar nixon afección. Empiece cada ejercicio lentamente. Reduzca la intensidad del ejercicio si Omid Dario a tener dolor. Nixon médico o fisioterapeuta le dirán cuándo puede comenzar con estos ejercicios y cuáles funcionarán mejor para usted. Cómo hacer los ejercicios  [place]    1. Acuéstese boca abajo, apoyando nixon cuerpo con los antebrazos. 2. Isaías presión con los codos en el piso para elevar la espalda. Al hacer esto, relaje los músculos del estómago y permita que nixon espalda se arquee sin usar los músculos de la espalda. Al hacer presión, evite que las caderas o la pelvis se separen del piso. 3. Mantenga la posición de 15 a 30 segundos y luego relájese. 4. Repita de 2 a 4 veces. Ejercicio de alternar brazo y pierna (alex de caza)    1. Comience con las carmela y las rodillas en el piso. 2. Contraiga los músculos del abdomen. 3. Eleve gavin pierna del suelo y manténgala recta detrás de usted. Tenga cuidado de no dejar caer la cadera hacia abajo, porque eso hará que se le tuerza el tronco.  4. Mantenga la posición lacy unos 6 segundos y luego baje la pierna y [place] a la otra pierna. 5. Repita de 8 a 12 veces con cada pierna.   6. Con el tiempo, vaya aumentando [employer] de 10 a 30 segundos cada vez.  7. Si se siente estable y seguro con la pierna elevada, intente levantar el brazo opuesto directamente enfrente de usted al [facility]. Ejercicio de rodillas al pecho    1. Acuéstese boca arriba con las rodillas flexionadas y los pies apoyados sobre el piso. 2. Lleve gavin rodilla hacia el pecho, manteniendo el otro pie apoyado en el suelo (o dejando la otra pierna recta, katrin lo sienta mejor en la parte baja de la espalda). 3. Mantenga la parte baja de la espalda presionada contra el suelo. Sosténgalo por lo menos de 15 a 30 segundos. 4. Relájese y regrese la rodilla a la posición inicial.  5. Repita el ejercicio con la otra pierna. Repita de 2 a 4 veces con cada pierna. 6. Para lograr mayor estiramiento, deje la otra pierna apoyada en el suelo mientras se aliza la rodilla [place]. Abdominales    1. Acuéstese en el suelo boca arriba, con las rodillas dobladas en un ángulo de 90 grados. Los pies deben estar apoyados en el suelo, a unas 12 pulgadas (30 cm) de las nalgas (glúteos). 2. Cruce los [place]. Si esto le causa molestias en el estrella, pruebe a poner las carmela detrás del estrella (no de la kathie), con los codos abiertos. 3. Contraiga lentamente los músculos del abdomen y eleve los omóplatos del suelo. 4. Mantenga la kathie alineada con el cuerpo y no presione la barbilla hacia el pecho. 5. Sostenga esta posición por 1 o 2 segundos y después baje lentamente de nuevo hacia el suelo. 6. Repita de 8 a 12 veces. Ejercicio de inclinación de pelvis    1. Acuéstese de espalda con las rodillas flexionadas. 2. \"Tense\" nixon estómago. Soham significa apretar los músculos contrayendo el ombligo e imaginando que se mueve hacia la columna vertebral. Deberá sentir katrin si la espalda estuviera ejerciendo presión sobre el suelo y que las caderas y la pelvis se balancean hacia atrás.   3. Mantenga la posición lacy aproximadamente 6 segundos mientras respira suavemente. 4. Repita de 8 a 12 veces. Ardon con el talón    1. Acuéstese boca arriba con ambas rodillas flexionadas y los tobillos doblados de manera que solo los talones estén sobre el piso. Las rodillas deben estar dobladas más o menos a 90 grados. 2. A continuación edelmira presión con los talones en el suelo, apriete las nalgas (glúteos) y levante las caderas del suelo hasta que los hombros, las caderas y las rodillas estén en línea recta. 3. Mantenga la posición lacy unos 6 segundos mientras sigue respirando normalmente, y luego baje lentamente las caderas hacia el piso y descanse por hasta 10 segundos. [address] a 12 repeticiones. Estiramiento de isquiotibiales en el felisa de gavin geraldine    1. Acuéstese boca arriba en el felisa de [place], con gavin pierna a través de la [place]. 2. Deslice la pierna hacia arriba por la pared, para enderezar la rodilla. Debe sentir un leve estiramiento en la parte posterior de la pierna. 3. Sostenga el estiramiento por lo menos de 15 a 30 segundos. No arquee la espalda, estire los dedos de los pies ni flexione las rodillas. Mantenga un talón tocando el suelo y el otro tocando la pared. 4. Repita con la otra pierna. 5. Repita de 2 a 4 veces con cada pierna. Estiramiento de los músculos flexores de la cadera    1. Póngase de rodillas en el suelo con gavin Wandra Bird y Radha Lapping atrás. Coloque la rodilla de adelante encima del pie. Mantenga la otra rodilla en contacto con el suelo. 2. Empuje lentamente la cadera hacia adelante hasta que sienta un estiramiento en la parte superior del muslo de la pierna que está atrás. 3. Sostenga el estiramiento por lo menos de 15 a 30 segundos. Repita con la otra pierna. 4. Repita de 2 a 4 veces a cada lado. Sentadillas de pared    1. Párese con la espalda a entre 10 y 12 pulgadas (25 a 30 cm) de distancia de la pared.   2. Inclínese hacia la pared [employer] la espalda quede plana sobre sharon.  3. Deslícese lentamente hacia abajo hasta que las rodillas estén ligeramente flexionadas, presionando la parte baja de la espalda contra la pared. 4. Mantenga la posición lacy unos 6 segundos y después deslícese hacia arriba por la pared. 5. Repita de 8 a 12 veces. La atención de seguimiento es gavin parte clave de nixon tratamiento y seguridad. Asegúrese de hacer y acudir a todas las citas, y llame a nixon médico si está teniendo problemas. También es gavin buena idea saber los resultados de amena exámenes y mantener gavin lista de los medicamentos que alesia. ¿Dónde puede encontrar más información en inglés? Leona Mark a http://stephanie-jose eduardo.info/. [place] Ask [address] en la búsqueda para aprender más acerca de \"Dolor en la parte baja de la espalda (lumbalgia): Ejercicios - [ Low Back Pain: Exercises ]. \"  Revisado: 20 septiembre, 2018  Versión del contenido: 12.1  © 9686-6586 Healthwise, Incorporated. Las instrucciones de cuidado fueron adaptadas bajo licencia por [employer]ctions (which disclaims liability or warranty for this information). Si usted tiene Ritchie Prichard afección médica o sobre estas instrucciones, siempre pregunte a nixon profesional de amy. [place], [place] niega toda garantía o responsabilidad por nixon uso de esta información.

## 2019-07-29 NOTE — ED NOTES
Patient given discharge instructions. Patient able to verbalize understanding. Patient given 2 Rx. Patient ambulatory from ED with friend.

## 2019-07-29 NOTE — ED TRIAGE NOTES
Pt here for generalized muscle pain x 1 month with burning in feet and waist. Pt states pain in legs has gotten worse over last week and is difficult to walk. States vision has been cloudy over last week. Denies fevers, CP, or SOB.

## 2019-07-29 NOTE — ED PROVIDER NOTES
62 y.o. male with no significant past medical history who presents from home accompanied by a relative with chief complaint of bilateral leg pain. Pt states for the past month he has generalized body pain. He notes over the past week he has noticed increased pain of his bilateral legs with tingling in his toes. He notes difficultly with ambulating secondary to his pain. He denies taking any medications to modify his symptoms at home. Pt specifically denies any fever, chills, headache, cough, congestion, shortness of breath, chest pain, abdominal pain, nausea, vomiting, diarrhea, dysuria, or urinary frequency. He denies any past medical history, surgeries or allergies. Per chart review pt underwent a negative stress test in April of 2019. There are no other acute medical concerns at this time. Social Hx: negative tobacco use, negative EtOH use, negative Illicit Drug use    PCP: None    Note written by Gabriel Martínez, as dictated by Jaylen Astorga DO 8:40 PM    The history is provided by the patient. A  was used (Yi). Past Medical History:   Diagnosis Date    BPH (benign prostatic hyperplasia)        No past surgical history on file.       Family History:   Problem Relation Age of Onset    Parkinsonism Mother     Arthritis-osteo Father     Diabetes Brother     Heart Disease Brother     Heart Surgery Brother        Social History     Socioeconomic History    Marital status: SINGLE     Spouse name: Not on file    Number of children: Not on file    Years of education: Not on file    Highest education level: Not on file   Occupational History    Not on file   Social Needs    Financial resource strain: Not on file    Food insecurity:     Worry: Not on file     Inability: Not on file    Transportation needs:     Medical: Not on file     Non-medical: Not on file   Tobacco Use    Smoking status: Never Smoker    Smokeless tobacco: Never Used   Substance and Sexual Activity    Alcohol use: No     Alcohol/week: 0.0 standard drinks    Drug use: No    Sexual activity: Yes     Partners: Female   Lifestyle    Physical activity:     Days per week: Not on file     Minutes per session: Not on file    Stress: Not on file   Relationships    Social connections:     Talks on phone: Not on file     Gets together: Not on file     Attends Restorationist service: Not on file     Active member of club or organization: Not on file     Attends meetings of clubs or organizations: Not on file     Relationship status: Not on file    Intimate partner violence:     Fear of current or ex partner: Not on file     Emotionally abused: Not on file     Physically abused: Not on file     Forced sexual activity: Not on file   Other Topics Concern    Not on file   Social History Narrative    ** Merged History Encounter **         ** Merged History Encounter **              ALLERGIES: Patient has no known allergies. Review of Systems   Constitutional: Negative for appetite change, chills, fever and unexpected weight change. HENT: Negative for ear pain, hearing loss, rhinorrhea and trouble swallowing. Eyes: Negative for pain and visual disturbance. Respiratory: Negative for cough, chest tightness and shortness of breath. Cardiovascular: Negative for chest pain and palpitations. Gastrointestinal: Negative for abdominal distention, abdominal pain, blood in stool and vomiting. Genitourinary: Negative for dysuria, hematuria and urgency. Musculoskeletal: Positive for myalgias. Negative for back pain. +BLE pain   Skin: Negative for rash. Neurological: Negative for dizziness, syncope, weakness and numbness. + tingling in toes   Psychiatric/Behavioral: Negative for confusion and suicidal ideas. All other systems reviewed and are negative.       Vitals:    07/28/19 2032 07/28/19 2145 07/28/19 2200   BP: 141/73 143/87 (!) 134/96   Pulse: 64     Resp: 14     Temp: 98.2 °F (36.8 °C)     SpO2: 98% 100% 100%   Weight: 63.5 kg (140 lb)     Height: 5' 8\" (1.727 m)              Physical Exam   Constitutional: He is oriented to person, place, and time. He appears well-developed and well-nourished. No distress. HENT:   Head: Normocephalic and atraumatic. Right Ear: External ear normal.   Left Ear: External ear normal.   Nose: Nose normal.   Mouth/Throat: Oropharynx is clear and moist. No oropharyngeal exudate. Eyes: Pupils are equal, round, and reactive to light. Conjunctivae and EOM are normal. Right eye exhibits no discharge. Left eye exhibits no discharge. No scleral icterus. Neck: Normal range of motion. Neck supple. No JVD present. No tracheal deviation present. Cardiovascular: Normal rate, regular rhythm, normal heart sounds and intact distal pulses. Exam reveals no gallop and no friction rub. No murmur heard. Pulses 2+ and equal.   Pulmonary/Chest: Effort normal and breath sounds normal. No stridor. No respiratory distress. He has no decreased breath sounds. He has no wheezes. He has no rhonchi. He has no rales. He exhibits no tenderness. Abdominal: Soft. Bowel sounds are normal. He exhibits no distension. There is no tenderness. There is no rebound and no guarding. Musculoskeletal: Normal range of motion. He exhibits no edema or tenderness. Neurological: He is alert and oriented to person, place, and time. He has normal strength and normal reflexes. He displays normal reflexes. No cranial nerve deficit or sensory deficit. He exhibits normal muscle tone. Coordination normal. GCS eye subscore is 4. GCS verbal subscore is 5. GCS motor subscore is 6. No sensor or motor deficits noted. Skin: Skin is warm and dry. No rash noted. He is not diaphoretic. No erythema. No pallor. Callous ball of the left foot    Psychiatric: He has a normal mood and affect. His behavior is normal. Judgment and thought content normal.   Nursing note and vitals reviewed.   Note written by Gabriel Noble, as dictated by Florentino Gastelum DO 8:40 PM     MDM  Number of Diagnoses or Management Options  Body aches:   Lumbar radiculopathy:      Amount and/or Complexity of Data Reviewed  Clinical lab tests: ordered and reviewed  Tests in the radiology section of CPT®: ordered and reviewed    Risk of Complications, Morbidity, and/or Mortality  Presenting problems: moderate  Diagnostic procedures: moderate  Management options: moderate    Patient Progress  Patient progress: stable       Procedures      Chief Complaint   Patient presents with    Generalized Body Aches    Cloudy Vision       The patient's presenting problems have been discussed, and they are in agreement with the care plan formulated and outlined with them. I have encouraged them to ask questions as they arise throughout their visit.     MEDICATIONS GIVEN:  Medications   sodium chloride (NS) flush 5-40 mL (has no administration in time range)   sodium chloride (NS) flush 5-40 mL (has no administration in time range)   dexamethasone (PF) (DECADRON) injection 10 mg (10 mg IntraVENous Given 7/28/19 2109)   ketorolac (TORADOL) injection 30 mg (30 mg IntraVENous Given 7/28/19 2105)   cyclobenzaprine (FLEXERIL) tablet 10 mg (10 mg Oral Given 7/28/19 2108)       LABS REVIEWED:  Recent Results (from the past 24 hour(s))   GLUCOSE, POC    Collection Time: 07/28/19  9:04 PM   Result Value Ref Range    Glucose (POC) 111 (H) 65 - 100 mg/dL    Performed by Maya Prater    CBC WITH AUTOMATED DIFF    Collection Time: 07/28/19  9:06 PM   Result Value Ref Range    WBC 5.7 4.1 - 11.1 K/uL    RBC 4.11 4.10 - 5.70 M/uL    HGB 12.5 12.1 - 17.0 g/dL    HCT 36.2 (L) 36.6 - 50.3 %    MCV 88.1 80.0 - 99.0 FL    MCH 30.4 26.0 - 34.0 PG    MCHC 34.5 30.0 - 36.5 g/dL    RDW 12.7 11.5 - 14.5 %    PLATELET 341 496 - 487 K/uL    MPV 10.0 8.9 - 12.9 FL    NRBC 0.0 0  WBC    ABSOLUTE NRBC 0.00 0.00 - 0.01 K/uL    NEUTROPHILS 54 32 - 75 %    LYMPHOCYTES 32 12 - 49 %    MONOCYTES 8 5 - 13 %    EOSINOPHILS 5 0 - 7 %    BASOPHILS 1 0 - 1 %    IMMATURE GRANULOCYTES 0 0.0 - 0.5 %    ABS. NEUTROPHILS 3.1 1.8 - 8.0 K/UL    ABS. LYMPHOCYTES 1.8 0.8 - 3.5 K/UL    ABS. MONOCYTES 0.4 0.0 - 1.0 K/UL    ABS. EOSINOPHILS 0.3 0.0 - 0.4 K/UL    ABS. BASOPHILS 0.0 0.0 - 0.1 K/UL    ABS. IMM. GRANS. 0.0 0.00 - 0.04 K/UL    DF AUTOMATED     METABOLIC PANEL, COMPREHENSIVE    Collection Time: 07/28/19  9:06 PM   Result Value Ref Range    Sodium 137 136 - 145 mmol/L    Potassium 3.8 3.5 - 5.1 mmol/L    Chloride 104 97 - 108 mmol/L    CO2 26 21 - 32 mmol/L    Anion gap 7 5 - 15 mmol/L    Glucose 108 (H) 65 - 100 mg/dL    BUN 12 6 - 20 MG/DL    Creatinine 1.16 0.70 - 1.30 MG/DL    BUN/Creatinine ratio 10 (L) 12 - 20      GFR est AA >60 >60 ml/min/1.73m2    GFR est non-AA >60 >60 ml/min/1.73m2    Calcium 8.1 (L) 8.5 - 10.1 MG/DL    Bilirubin, total 0.5 0.2 - 1.0 MG/DL    ALT (SGPT) 30 12 - 78 U/L    AST (SGOT) 30 15 - 37 U/L    Alk. phosphatase 87 45 - 117 U/L    Protein, total 6.4 6.4 - 8.2 g/dL    Albumin 3.5 3.5 - 5.0 g/dL    Globulin 2.9 2.0 - 4.0 g/dL    A-G Ratio 1.2 1.1 - 2.2     SAMPLES BEING HELD    Collection Time: 07/28/19  9:06 PM   Result Value Ref Range    SAMPLES BEING HELD SST,BLUE,GREEN,RED     COMMENT        Add-on orders for these samples will be processed based on acceptable specimen integrity and analyte stability, which may vary by analyte.    URINALYSIS W/MICROSCOPIC    Collection Time: 07/28/19  9:33 PM   Result Value Ref Range    Color YELLOW/STRAW      Appearance CLEAR CLEAR      Specific gravity <1.005 1.003 - 1.030    pH (UA) 6.0 5.0 - 8.0      Protein NEGATIVE  NEG mg/dL    Glucose NEGATIVE  NEG mg/dL    Ketone NEGATIVE  NEG mg/dL    Bilirubin NEGATIVE  NEG      Blood NEGATIVE  NEG      Urobilinogen 0.2 0.2 - 1.0 EU/dL    Nitrites NEGATIVE  NEG      Leukocyte Esterase NEGATIVE  NEG      WBC 0-4 0 - 4 /hpf    RBC 0-5 0 - 5 /hpf    Epithelial cells FEW FEW /lpf Bacteria NEGATIVE  NEG /hpf   URINE CULTURE HOLD SAMPLE    Collection Time: 07/28/19  9:33 PM   Result Value Ref Range    Urine culture hold        URINE ON HOLD IN MICROBIOLOGY DEPT FOR 3 DAYS. IF UNPRESERVED URINE IS SUBMITTED, IT CANNOT BE USED FOR ADDITIONAL TESTING AFTER 24 HRS, RECOLLECTION WILL BE REQUIRED. VITAL SIGNS:  Patient Vitals for the past 24 hrs:   Temp Pulse Resp BP SpO2   07/28/19 2200    (!) 134/96 100 %   07/28/19 2145    143/87 100 %   07/28/19 2032 98.2 °F (36.8 °C) 64 14 141/73 98 %       RADIOLOGY RESULTS:  The following have been ordered and reviewed:  Ct Spine Lumb Wo Cont    Result Date: 7/28/2019  EXAM: CT SPINE LUMB WO CONT INDICATION: back/leg pain COMPARISON: None. TECHNIQUE:   Unenhanced multislice helical CT of the lumbar spine was performed in the axial plane. Coronal and sagittal reconstructions were obtained. CT dose reduction was achieved through use of a standardized protocol tailored for this examination and automatic exposure control for dose modulation. FINDINGS: Incidental imaging of the paraspinal soft tissues and abdomen is unremarkable. Minimal anterior osteophytes. Degenerative change in the posterior elements at L5/S1 on the left. Slight loss of disc space height on the right at L4/L5. Mild, neuro foraminal stenosis at L5/S1 bilaterally. No visible fracture or subluxation. IMPRESSION: 1. No visible fracture or subluxation      PROGRESS NOTES:    10:12 PM  Discussed results and plan with patient. Patient will be discharged home with PCP follow up. Patient instructed to return to the emergency room for any worsening symptoms or any other concerns. DIAGNOSIS:    1. Lumbar radiculopathy    2. Body aches    3. Pharyngeal dysphagia    4.  Epigastric fullness        PLAN:  Follow-up Information     Follow up With Specialties Details Why Contact Info    Ryan Lazar MD Family Practice, Family Practice Schedule an appointment as soon as possible for a visit  [phone]  [phone]      [facility] EMERGENCY DEPT Emergency Medicine  If symptoms worsen [address]  [phone]        Discharge Medication List as of 7/28/2019  9:54 PM      START taking these medications    Details   cyclobenzaprine (FLEXERIL) 10 mg tablet Take 1 Tab by mouth three (3) times daily as needed for Muscle Spasm(s). , Print, Disp-20 Tab, R-0      predniSONE (STERAPRED DS) 10 mg dose pack Take as directed. , Print, Disp-21 Tab, R-0         CONTINUE these medications which have NOT CHANGED    Details   clotrimazole-betamethasone (LOTRISONE) topical cream Apply twice a day to affected area of fingers x 2 weeks, Normal, Disp-45 g, R-0      Omeprazole delayed release (PRILOSEC D/R) 20 mg tablet Take 1 Tab by mouth Daily (before breakfast). [place] 1 tableta gavin vez al [place] antes de desayuno, Normal, Disp-90 Tab, R-1      glycerin-mineral oil-lanolin (EUCERIN PLUS) topical cream Apply  to affected area as needed for Dry Skin., Normal, Disp-1 Tube, R-0           10:12 PM  Patient's results have been reviewed with them. Patient and/or family have verbally conveyed their understanding and agreement of the patient's signs, symptoms, diagnosis, treatment and prognosis and additionally agree to follow up as recommended or return to the Emergency Room should their condition change prior to follow-up. Discharge instructions have also been provided to the patient with some educational information regarding their diagnosis as well a list of reasons why they would want to return to the ER prior to their follow-up appointment should their condition change. ED COURSE: The patient's hospital course has been uncomplicated.

## 2020-01-29 ENCOUNTER — APPOINTMENT (OUTPATIENT)
Dept: ULTRASOUND IMAGING | Age: 60
End: 2020-01-29
Attending: NURSE PRACTITIONER
Payer: SUBSIDIZED

## 2020-01-29 ENCOUNTER — HOSPITAL ENCOUNTER (EMERGENCY)
Age: 60
Discharge: HOME | End: 2020-01-29
Attending: STUDENT IN AN ORGANIZED HEALTH CARE EDUCATION/TRAINING PROGRAM
Payer: SUBSIDIZED

## 2020-01-29 ENCOUNTER — APPOINTMENT (OUTPATIENT)
Dept: GENERAL RADIOLOGY | Age: 60
End: 2020-01-29
Attending: STUDENT IN AN ORGANIZED HEALTH CARE EDUCATION/TRAINING PROGRAM
Payer: SUBSIDIZED

## 2020-01-29 VITALS
OXYGEN SATURATION: 100 % | HEART RATE: 87 BPM | BODY MASS INDEX: 21.29 KG/M2 | SYSTOLIC BLOOD PRESSURE: 118 MMHG | RESPIRATION RATE: 16 BRPM | WEIGHT: 140 LBS | TEMPERATURE: 98.5 F | DIASTOLIC BLOOD PRESSURE: 96 MMHG

## 2020-01-29 DIAGNOSIS — R10.13 ABDOMINAL PAIN, EPIGASTRIC: ICD-10-CM

## 2020-01-29 DIAGNOSIS — R07.9 CHEST PAIN, UNSPECIFIED TYPE: Primary | ICD-10-CM

## 2020-01-29 DIAGNOSIS — N39.0 URINARY TRACT INFECTION WITHOUT HEMATURIA, SITE UNSPECIFIED: ICD-10-CM

## 2020-01-29 LAB
ALBUMIN SERPL-MCNC: 3.9 G/DL (ref 3.5–5)
ALBUMIN/GLOB SERPL: 1.2 {RATIO} (ref 1.1–2.2)
ALP SERPL-CCNC: 100 U/L (ref 45–117)
ALT SERPL-CCNC: 26 U/L (ref 12–78)
ANION GAP SERPL CALC-SCNC: 6 MMOL/L (ref 5–15)
APPEARANCE UR: CLEAR
AST SERPL-CCNC: 23 U/L (ref 15–37)
ATRIAL RATE: 86 BPM
BACTERIA URNS QL MICRO: NEGATIVE /HPF
BASOPHILS # BLD: 0 K/UL (ref 0–0.1)
BASOPHILS NFR BLD: 0 % (ref 0–1)
BILIRUB SERPL-MCNC: 0.6 MG/DL (ref 0.2–1)
BILIRUB UR QL: NEGATIVE
BUN SERPL-MCNC: 6 MG/DL (ref 6–20)
BUN/CREAT SERPL: 7 (ref 12–20)
CALCIUM SERPL-MCNC: 8.4 MG/DL (ref 8.5–10.1)
CALCULATED P AXIS, ECG09: 53 DEGREES
CALCULATED R AXIS, ECG10: 52 DEGREES
CALCULATED T AXIS, ECG11: 54 DEGREES
CHLORIDE SERPL-SCNC: 109 MMOL/L (ref 97–108)
CO2 SERPL-SCNC: 27 MMOL/L (ref 21–32)
COLOR UR: ABNORMAL
COMMENT, HOLDF: NORMAL
CREAT SERPL-MCNC: 0.86 MG/DL (ref 0.7–1.3)
DIAGNOSIS, 93000: NORMAL
DIFFERENTIAL METHOD BLD: ABNORMAL
EOSINOPHIL # BLD: 0.1 K/UL (ref 0–0.4)
EOSINOPHIL NFR BLD: 1 % (ref 0–7)
EPITH CASTS URNS QL MICRO: ABNORMAL /LPF
ERYTHROCYTE [DISTWIDTH] IN BLOOD BY AUTOMATED COUNT: 13.5 % (ref 11.5–14.5)
GLOBULIN SER CALC-MCNC: 3.2 G/DL (ref 2–4)
GLUCOSE SERPL-MCNC: 96 MG/DL (ref 65–100)
GLUCOSE UR STRIP.AUTO-MCNC: NEGATIVE MG/DL
HCT VFR BLD AUTO: 43.7 % (ref 36.6–50.3)
HGB BLD-MCNC: 15.3 G/DL (ref 12.1–17)
HGB UR QL STRIP: NEGATIVE
HYALINE CASTS URNS QL MICRO: ABNORMAL /LPF (ref 0–5)
IMM GRANULOCYTES # BLD AUTO: 0 K/UL (ref 0–0.04)
IMM GRANULOCYTES NFR BLD AUTO: 0 % (ref 0–0.5)
KETONES UR QL STRIP.AUTO: NEGATIVE MG/DL
LEUKOCYTE ESTERASE UR QL STRIP.AUTO: ABNORMAL
LIPASE SERPL-CCNC: 99 U/L (ref 73–393)
LYMPHOCYTES # BLD: 1.5 K/UL (ref 0.8–3.5)
LYMPHOCYTES NFR BLD: 16 % (ref 12–49)
MCH RBC QN AUTO: 30.2 PG (ref 26–34)
MCHC RBC AUTO-ENTMCNC: 35 G/DL (ref 30–36.5)
MCV RBC AUTO: 86.4 FL (ref 80–99)
MONOCYTES # BLD: 0.6 K/UL (ref 0–1)
MONOCYTES NFR BLD: 6 % (ref 5–13)
NEUTS SEG # BLD: 7 K/UL (ref 1.8–8)
NEUTS SEG NFR BLD: 77 % (ref 32–75)
NITRITE UR QL STRIP.AUTO: NEGATIVE
NRBC # BLD: 0 K/UL (ref 0–0.01)
NRBC BLD-RTO: 0 PER 100 WBC
P-R INTERVAL, ECG05: 118 MS
PH UR STRIP: 7.5 [PH] (ref 5–8)
PLATELET # BLD AUTO: 227 K/UL (ref 150–400)
PMV BLD AUTO: 9.7 FL (ref 8.9–12.9)
POTASSIUM SERPL-SCNC: 3.9 MMOL/L (ref 3.5–5.1)
PROT SERPL-MCNC: 7.1 G/DL (ref 6.4–8.2)
PROT UR STRIP-MCNC: 30 MG/DL
Q-T INTERVAL, ECG07: 390 MS
QRS DURATION, ECG06: 90 MS
QTC CALCULATION (BEZET), ECG08: 466 MS
RBC # BLD AUTO: 5.06 M/UL (ref 4.1–5.7)
RBC #/AREA URNS HPF: ABNORMAL /HPF (ref 0–5)
SAMPLES BEING HELD,HOLD: NORMAL
SODIUM SERPL-SCNC: 142 MMOL/L (ref 136–145)
SP GR UR REFRACTOMETRY: 1.01 (ref 1–1.03)
TROPONIN I SERPL-MCNC: <0.05 NG/ML
UROBILINOGEN UR QL STRIP.AUTO: 0.2 EU/DL (ref 0.2–1)
VENTRICULAR RATE, ECG03: 86 BPM
WBC # BLD AUTO: 9.2 K/UL (ref 4.1–11.1)
WBC URNS QL MICRO: ABNORMAL /HPF (ref 0–4)

## 2020-01-29 PROCEDURE — 83690 ASSAY OF LIPASE: CPT

## 2020-01-29 PROCEDURE — 74011000250 HC RX REV CODE- 250: Performed by: STUDENT IN AN ORGANIZED HEALTH CARE EDUCATION/TRAINING PROGRAM

## 2020-01-29 PROCEDURE — 85025 COMPLETE CBC W/AUTO DIFF WBC: CPT

## 2020-01-29 PROCEDURE — 36415 COLL VENOUS BLD VENIPUNCTURE: CPT

## 2020-01-29 PROCEDURE — 81001 URINALYSIS AUTO W/SCOPE: CPT

## 2020-01-29 PROCEDURE — 74011250637 HC RX REV CODE- 250/637: Performed by: STUDENT IN AN ORGANIZED HEALTH CARE EDUCATION/TRAINING PROGRAM

## 2020-01-29 PROCEDURE — 84484 ASSAY OF TROPONIN QUANT: CPT

## 2020-01-29 PROCEDURE — 80053 COMPREHEN METABOLIC PANEL: CPT

## 2020-01-29 PROCEDURE — 99284 EMERGENCY DEPT VISIT MOD MDM: CPT

## 2020-01-29 PROCEDURE — 76705 ECHO EXAM OF ABDOMEN: CPT

## 2020-01-29 PROCEDURE — 71046 X-RAY EXAM CHEST 2 VIEWS: CPT

## 2020-01-29 PROCEDURE — 93005 ELECTROCARDIOGRAM TRACING: CPT

## 2020-01-29 RX ORDER — FAMOTIDINE 20 MG/1
20 TABLET, FILM COATED ORAL
Status: COMPLETED | OUTPATIENT
Start: 2020-01-29 | End: 2020-01-29

## 2020-01-29 RX ORDER — SULFAMETHOXAZOLE AND TRIMETHOPRIM 800; 160 MG/1; MG/1
1 TABLET ORAL 2 TIMES DAILY
Qty: 14 TAB | Refills: 0 | Status: SHIPPED | OUTPATIENT
Start: 2020-01-29 | End: 2020-02-05

## 2020-01-29 RX ORDER — FAMOTIDINE 20 MG/1
20 TABLET, FILM COATED ORAL
Qty: 30 TAB | Refills: 0 | Status: SHIPPED | OUTPATIENT
Start: 2020-01-29 | End: 2020-02-28

## 2020-01-29 RX ADMIN — LIDOCAINE HYDROCHLORIDE 40 ML: 20 SOLUTION ORAL; TOPICAL at 14:49

## 2020-01-29 RX ADMIN — FAMOTIDINE 20 MG: 20 TABLET ORAL at 14:49

## 2020-01-29 NOTE — ED PROVIDER NOTES
The patient is a 80-year-old male history of BPH presenting to the emergency department today with abdominal pain and chest pain. He reports symptoms x1 month. He reports epigastric and left upper quadrant abdominal burning pain as well as discomfort in the suprapubic region. He states that sometimes he has difficulty completing his stream of urine but he does have known prostate issues. Denies dysuria, hematuria or fevers. He reports lack of appetite as well as some pain radiating up into the left breast region intermittently. No exertional or pleuritic chest pain. No fever, vomiting, diarrhea or cough. No shortness of breath. Denies leg pain or leg swelling. He has not taken anything for his pain or seen any other providers for the pain. He reports that he came in today due to persistence of pain and not specifically worsening of pain. The history is provided by the patient. The history is limited by a language barrier (Kazakh. ). A  was used ([place]. ). Past Medical History:   Diagnosis Date    BPH (benign prostatic hyperplasia)        No past surgical history on file.       Family History:   Problem Relation Age of Onset    Parkinsonism Mother     Arthritis-osteo Father     Diabetes Brother     Heart Disease Brother     Heart Surgery Brother        Social History     Socioeconomic History    Marital status: SINGLE     Spouse name: Not on file    Number of children: Not on file    Years of education: Not on file    Highest education level: Not on file   Occupational History    Not on file   Social Needs    Financial resource strain: Not on file    Food insecurity:     Worry: Not on file     Inability: Not on file    Transportation needs:     Medical: Not on file     Non-medical: Not on file   Tobacco Use    Smoking status: Never Smoker    Smokeless tobacco: Never Used   Substance and Sexual Activity    Alcohol use: No     Alcohol/week: 0.0 standard drinks  Drug use: No    Sexual activity: Yes     Partners: Female   Lifestyle    Physical activity:     Days per week: Not on file     Minutes per session: Not on file    Stress: Not on file   Relationships    Social connections:     Talks on phone: Not on file     Gets together: Not on file     Attends Jew service: Not on file     Active member of club or organization: Not on file     Attends meetings of clubs or organizations: Not on file     Relationship status: Not on file    Intimate partner violence:     Fear of current or ex partner: Not on file     Emotionally abused: Not on file     Physically abused: Not on file     Forced sexual activity: Not on file   Other Topics Concern    Not on file   Social History Narrative    ** Merged History Encounter **         ** Merged History Encounter **              ALLERGIES: Patient has no known allergies. Review of Systems   Constitutional: Negative for chills and fever. HENT: Negative for congestion and sore throat. Eyes: Negative for pain and redness. Respiratory: Negative for cough and shortness of breath. Cardiovascular: Positive for chest pain. Negative for palpitations. Gastrointestinal: Positive for abdominal pain. Negative for diarrhea, nausea and vomiting. Genitourinary: Positive for difficulty urinating. Negative for frequency and hematuria. Musculoskeletal: Negative for back pain and neck pain. Skin: Negative for rash and wound. Neurological: Negative for dizziness and headaches. Hematological: Does not bruise/bleed easily. Vitals:    01/29/20 1125   BP: 127/85   Pulse: 87   Resp: 16   Temp: 98.5 °F (36.9 °C)   SpO2: 99%   Weight: 63.5 kg (140 lb)            Physical Exam  Vitals signs and nursing note reviewed. Constitutional:       General: He is not in acute distress. Appearance: He is well-developed. HENT:      Head: Normocephalic and atraumatic.    Eyes:      Conjunctiva/sclera: Conjunctivae normal. Pupils: Pupils are equal, round, and reactive to light. Neck:      Musculoskeletal: Normal range of motion and neck supple. Cardiovascular:      Rate and Rhythm: Normal rate and regular rhythm. Heart sounds: Normal heart sounds. No murmur. No friction rub. No gallop. Pulmonary:      Effort: Pulmonary effort is normal. No respiratory distress. Breath sounds: Normal breath sounds. No wheezing or rales. Abdominal:      General: Bowel sounds are normal. There is no distension. Palpations: Abdomen is soft. Tenderness: There is abdominal tenderness in the epigastric area, suprapubic area and left upper quadrant. There is no guarding or rebound. Musculoskeletal: Normal range of motion. Skin:     General: Skin is warm and dry. Capillary Refill: Capillary refill takes less than 2 seconds. Findings: No rash. Neurological:      Mental Status: He is alert and oriented to person, place, and time. EKG Interpretation:   ED Physician interpretation  Normal sinus rhythm rate of 86, normal axis, normal intervals, nonspecific ST-T wave changes    Labs Reviewed:   CBC: no significant leukocytosis or anemia   CMP: normal renal function, no significant electrolyte abnormality, glucose normal, LFT within normal limits  Trop: normal  UA: questionable uti  Lipase not c/w pancreatitis        Imaging Reviewed:   RUQ US neg  CXR neg for acute process      Course:    3:42 PM re-evaluated after GI cocktail. Feeling much better. MDM:  The patient is a 51-year-old male history of BPH presenting with 1 month of abdominal and chest discomfort. Symptoms seem to be worse when he eats so there is a suspicion for dyspepsia. Right upper quadrant ultrasound okay. Labs not consistent with pancreatitis, cholecystitis or biliary obstructive process.   He is overall very well-appearing and has minimal tenderness on exam.  He is also complaining of some suprapubic pain so check a urine and over 10-20 white cells with leukoesterase therefore I will treat him as a UTI. He has known BPH but his postvoid residual was okay today. I encouraged him to follow-up with urology and gastroenterology. Given prescription for Bactrim and Pepcid. Given strict return precautions using  service. Discharged home in stable condition. Clinical Impression:     ICD-10-CM ICD-9-CM    1. Chest pain, unspecified type R07.9 786.50    2. Abdominal pain, epigastric R10.13 789.06    3.  Urinary tract infection without hematuria, site unspecified N39.0 599.0            Disposition: YAMILET Galicia, DO

## 2020-01-29 NOTE — ED NOTES
utilized by phone, pt reports abdominal pain, describes as burning, pain in left breast, pain in left hip, reports symptoms x1 month, gradually getting worse, loss of appetite, small amount of pain with palpation, worse lower, when getting up in morning pain with urination, reports pain in chest is intermittent, pain in abdomen worse after eating, denies seeking treatment from PCP for symptoms

## 2020-01-29 NOTE — DISCHARGE INSTRUCTIONS
PLEASE RETURN IF ABDOMINAL PAIN WORSENS, LOCALIZES TO THE RIGHT LOWER ABDOMEN, FEVER, OR IF YOU ARE NOT ABLE TO KEEP DOWN LIQUIDS. FOLLOW UP IN 24 HOURS EITHER IN THE EMERGENCY DEPARTMENT OR WITH YOUR PRIMARY DOCTOR IF PAIN IS STILL PRESENT. PLEASE RETURN IF WORSENING PAIN OR IF YOU NOTICE IT WHEN EXERTING YOURSELF, TROUBLE BREATHING, SWEATINESS, OR DIZZINESS. FOLLOW UP WITH YOUR DOCTOR IN THE NEXT 2-3 DAYS. POR FAVOR [facility] SI EL DOLOR ABDOMINAL SE AUMENTA, [address] [facility],[address] AL ABDOMEN INFERIOR [address], [place] F[place] O SI NO PUEDE MANTENER LOS LÍQUIDOS ABAJO. SEGUIMIENTO EN 24 HORAS EN EL DEPARTAMENTO DE EMERGENCIA O CON MIMS MÉDICO PRIMARIO SI EL DOLOR [address] [facility]. POR FAVOR [facility] SI EL DOLOR ES [place]TE O SI LO AVISA CUANDO SE PRESENTA, [address] DE PROBLEMAS, [address] . SEGUIMIENTO CON MIMS MÉDICO EN LOS PRÓXIMOS 2-3 DÍAS.

## 2020-01-30 NOTE — ED NOTES

## 2020-02-18 ENCOUNTER — APPOINTMENT (OUTPATIENT)
Dept: CT IMAGING | Age: 60
End: 2020-02-18
Attending: NURSE PRACTITIONER
Payer: SUBSIDIZED

## 2020-02-18 ENCOUNTER — HOSPITAL ENCOUNTER (EMERGENCY)
Age: 60
Discharge: HOME | End: 2020-02-18
Attending: EMERGENCY MEDICINE
Payer: SUBSIDIZED

## 2020-02-18 VITALS
HEART RATE: 75 BPM | SYSTOLIC BLOOD PRESSURE: 141 MMHG | WEIGHT: 140 LBS | OXYGEN SATURATION: 100 % | TEMPERATURE: 97.3 F | RESPIRATION RATE: 16 BRPM | DIASTOLIC BLOOD PRESSURE: 87 MMHG | BODY MASS INDEX: 21.29 KG/M2

## 2020-02-18 DIAGNOSIS — K52.9 GASTROENTERITIS, ACUTE: ICD-10-CM

## 2020-02-18 DIAGNOSIS — R11.2 NON-INTRACTABLE VOMITING WITH NAUSEA, UNSPECIFIED VOMITING TYPE: Primary | ICD-10-CM

## 2020-02-18 LAB
ALBUMIN SERPL-MCNC: 4.1 G/DL (ref 3.5–5)
ALBUMIN/GLOB SERPL: 1.3 {RATIO} (ref 1.1–2.2)
ALP SERPL-CCNC: 73 U/L (ref 45–117)
ALT SERPL-CCNC: 32 U/L (ref 12–78)
ANION GAP SERPL CALC-SCNC: 5 MMOL/L (ref 5–15)
APPEARANCE UR: CLEAR
AST SERPL-CCNC: 27 U/L (ref 15–37)
BACTERIA URNS QL MICRO: NEGATIVE /HPF
BASOPHILS # BLD: 0 K/UL (ref 0–0.1)
BASOPHILS NFR BLD: 0 % (ref 0–1)
BILIRUB SERPL-MCNC: 1 MG/DL (ref 0.2–1)
BILIRUB UR QL: NEGATIVE
BUN SERPL-MCNC: 6 MG/DL (ref 6–20)
BUN/CREAT SERPL: 7 (ref 12–20)
CALCIUM SERPL-MCNC: 9 MG/DL (ref 8.5–10.1)
CHLORIDE SERPL-SCNC: 106 MMOL/L (ref 97–108)
CO2 SERPL-SCNC: 30 MMOL/L (ref 21–32)
COLOR UR: NORMAL
COMMENT, HOLDF: NORMAL
CREAT SERPL-MCNC: 0.87 MG/DL (ref 0.7–1.3)
DIFFERENTIAL METHOD BLD: NORMAL
EOSINOPHIL # BLD: 0.1 K/UL (ref 0–0.4)
EOSINOPHIL NFR BLD: 2 % (ref 0–7)
EPITH CASTS URNS QL MICRO: NORMAL /LPF
ERYTHROCYTE [DISTWIDTH] IN BLOOD BY AUTOMATED COUNT: 13.6 % (ref 11.5–14.5)
GLOBULIN SER CALC-MCNC: 3.2 G/DL (ref 2–4)
GLUCOSE SERPL-MCNC: 93 MG/DL (ref 65–100)
GLUCOSE UR STRIP.AUTO-MCNC: NEGATIVE MG/DL
HCT VFR BLD AUTO: 45.8 % (ref 36.6–50.3)
HGB BLD-MCNC: 15.9 G/DL (ref 12.1–17)
HGB UR QL STRIP: NEGATIVE
IMM GRANULOCYTES # BLD AUTO: 0 K/UL (ref 0–0.04)
IMM GRANULOCYTES NFR BLD AUTO: 0 % (ref 0–0.5)
KETONES UR QL STRIP.AUTO: NEGATIVE MG/DL
LEUKOCYTE ESTERASE UR QL STRIP.AUTO: NEGATIVE
LIPASE SERPL-CCNC: 86 U/L (ref 73–393)
LYMPHOCYTES # BLD: 1.8 K/UL (ref 0.8–3.5)
LYMPHOCYTES NFR BLD: 29 % (ref 12–49)
MCH RBC QN AUTO: 30.4 PG (ref 26–34)
MCHC RBC AUTO-ENTMCNC: 34.7 G/DL (ref 30–36.5)
MCV RBC AUTO: 87.6 FL (ref 80–99)
MONOCYTES # BLD: 0.5 K/UL (ref 0–1)
MONOCYTES NFR BLD: 7 % (ref 5–13)
NEUTS SEG # BLD: 3.9 K/UL (ref 1.8–8)
NEUTS SEG NFR BLD: 62 % (ref 32–75)
NITRITE UR QL STRIP.AUTO: NEGATIVE
NRBC # BLD: 0 K/UL (ref 0–0.01)
NRBC BLD-RTO: 0 PER 100 WBC
PH UR STRIP: 7.5 [PH] (ref 5–8)
PLATELET # BLD AUTO: 278 K/UL (ref 150–400)
PMV BLD AUTO: 9.9 FL (ref 8.9–12.9)
POTASSIUM SERPL-SCNC: 3.8 MMOL/L (ref 3.5–5.1)
PROT SERPL-MCNC: 7.3 G/DL (ref 6.4–8.2)
PROT UR STRIP-MCNC: NEGATIVE MG/DL
RBC # BLD AUTO: 5.23 M/UL (ref 4.1–5.7)
RBC #/AREA URNS HPF: NORMAL /HPF
SAMPLES BEING HELD,HOLD: NORMAL
SODIUM SERPL-SCNC: 141 MMOL/L (ref 136–145)
SP GR UR REFRACTOMETRY: <1.005 (ref 1–1.03)
UR CULT HOLD, URHOLD: NORMAL
UROBILINOGEN UR QL STRIP.AUTO: 0.2 EU/DL (ref 0.2–1)
WBC # BLD AUTO: 6.3 K/UL (ref 4.1–11.1)
WBC URNS QL MICRO: NORMAL /HPF

## 2020-02-18 PROCEDURE — 99283 EMERGENCY DEPT VISIT LOW MDM: CPT

## 2020-02-18 PROCEDURE — 81001 URINALYSIS AUTO W/SCOPE: CPT

## 2020-02-18 PROCEDURE — 80053 COMPREHEN METABOLIC PANEL: CPT

## 2020-02-18 PROCEDURE — 83690 ASSAY OF LIPASE: CPT

## 2020-02-18 PROCEDURE — 74177 CT ABD & PELVIS W/CONTRAST: CPT

## 2020-02-18 PROCEDURE — 85025 COMPLETE CBC W/AUTO DIFF WBC: CPT

## 2020-02-18 PROCEDURE — 74011250636 HC RX REV CODE- 250/636: Performed by: NURSE PRACTITIONER

## 2020-02-18 PROCEDURE — 96374 THER/PROPH/DIAG INJ IV PUSH: CPT

## 2020-02-18 PROCEDURE — 74011636320 HC RX REV CODE- 636/320

## 2020-02-18 PROCEDURE — 96361 HYDRATE IV INFUSION ADD-ON: CPT

## 2020-02-18 PROCEDURE — 36415 COLL VENOUS BLD VENIPUNCTURE: CPT

## 2020-02-18 RX ORDER — ONDANSETRON 4 MG/1
4 TABLET, ORALLY DISINTEGRATING ORAL
Qty: 20 TAB | Refills: 0 | Status: SHIPPED | OUTPATIENT
Start: 2020-02-18 | End: 2020-07-20

## 2020-02-18 RX ORDER — ONDANSETRON 2 MG/ML
4 INJECTION INTRAMUSCULAR; INTRAVENOUS
Status: COMPLETED | OUTPATIENT
Start: 2020-02-18 | End: 2020-02-18

## 2020-02-18 RX ADMIN — ONDANSETRON 4 MG: 2 INJECTION INTRAMUSCULAR; INTRAVENOUS at 12:18

## 2020-02-18 RX ADMIN — IOPAMIDOL 100 ML: 755 INJECTION, SOLUTION INTRAVENOUS at 14:05

## 2020-02-18 RX ADMIN — SODIUM CHLORIDE 1000 ML: 900 INJECTION, SOLUTION INTRAVENOUS at 12:18

## 2020-02-18 NOTE — DISCHARGE INSTRUCTIONS
Felix por permitirnos cuidarlo hoy. Isaías un seguimiento con nixon proveedor de [employer] próximos 2-3 días si amena síntomas no mejoran. Plan para el hogar:      [place] / [place] Matter: lacy los próximos días, observe lo que come. Coma gavin Josiah Wilkes. Los fluidos son los [employer]. Gatorade, Powerade, agua son los mejores líquidos para beber. Dieta BRAT: plátanos, arroz, puré de [place] migd[place], té / tostadas. Agregue los alimentos lentamente y según lo tolere. El último tipo de alimentos para agregar será ensaladas, alimentos acéticos katrin cítricos, tomates y [employer]. Zofran cada 6-8 horas según sea necesario para las náuseas / vómitos    Regrese a la maikel de emergencias si tiene síntomas que [place], fiebre de [place] de 100.9, escalofríos, náuseas o vómitos. [place] no tiene un proveedor de atención primaria en la lista, lo derivaremos a [address] en [facility], [place]. Están abiertos los 7 días de la [place]. Vincenzo parte de la red de [employer]. Merry Finical a radiografías y laboratorios que había hecho aquí en la maikel de emergencias. Thank you for allowing us to care for you today. Please follow-up with your Primary Care provider in the next 2-3 days if your symptoms do not improve. Plan for home:       Nausea/vomiting: For the next few days watch what you eat. Eat a bland diet. Fluids are the most important. Gatorade, powerade, water are the best fluids to drink. BRAT Diet: Bananas, Rice, Applesauce, Tea/Toast.  Add foods back slowly and as you tolerate. The last type of foods to add back will be salads, acetic foods like citrus, tomatoes and spicy foods. Zofran every 6-8 hours as needed for nausea/vomiting    Come back to the ER if you have worsening symptoms, fevers over 100.9, shaking chills, nausea or vomiting.      Since you have no primary care provider listed we will refer you to [address] on [facility], [place]. They are open 7 days a week. They are a part of the [employer]. They will have access to x-rays and labs you had done here in the Emergency Room.

## 2020-02-18 NOTE — ED PROVIDER NOTES
Initial Complaint: nausea, vomiting & mild headache    Started: yesterday afternoon    Endorses: relates to drinking a \"natural\" juice he gets at the store. Abd pain, Mild HA, nausea, vomiting, feels nervous/shaky, mild room spinning dizziness with lightheadeness  Denies: sick contacts, F/C, D/C. Made better: not eating  Made worse: eating    No further complaints. Past Medical History:  No date: BPH (benign prostatic hyperplasia)  No past surgical history on file. Reviewed      Primary care provider: None'    The history is provided by the patient. A  was used ([id number]). Past Medical History:   Diagnosis Date    BPH (benign prostatic hyperplasia)      No past surgical history on file.       Family History:   Problem Relation Age of Onset    Parkinsonism Mother     Arthritis-osteo Father     Diabetes Brother     Heart Disease Brother     Heart Surgery Brother      Social History     Socioeconomic History    Marital status: SINGLE     Spouse name: Not on file    Number of children: Not on file    Years of education: Not on file    Highest education level: Not on file   Occupational History    Not on file   Social Needs    Financial resource strain: Not on file    Food insecurity:     Worry: Not on file     Inability: Not on file    Transportation needs:     Medical: Not on file     Non-medical: Not on file   Tobacco Use    Smoking status: Never Smoker    Smokeless tobacco: Never Used   Substance and Sexual Activity    Alcohol use: No     Alcohol/week: 0.0 standard drinks    Drug use: No    Sexual activity: Yes     Partners: Female   Lifestyle    Physical activity:     Days per week: Not on file     Minutes per session: Not on file    Stress: Not on file   Relationships    Social connections:     Talks on phone: Not on file     Gets together: Not on file     Attends Amish service: Not on file     Active member of club or organization: Not on file     Attends meetings of clubs or organizations: Not on file     Relationship status: Not on file    Intimate partner violence:     Fear of current or ex partner: Not on file     Emotionally abused: Not on file     Physically abused: Not on file     Forced sexual activity: Not on file   Other Topics Concern    Not on file   Social History Narrative    ** Merged History Encounter **         ** Merged History Encounter **          ALLERGIES: Patient has no known allergies. Review of Systems   Constitutional: Positive for activity change and appetite change. Negative for chills and fever. Gastrointestinal: Positive for nausea and vomiting. Neurological: Positive for dizziness, light-headedness and headaches. Psychiatric/Behavioral: Negative. All other systems reviewed and are negative. Vitals:    02/18/20 1111 02/18/20 1113   BP: 122/79    Pulse: 99    Resp: 16    Temp: 97.3 °F (36.3 °C)    SpO2: 100%    Weight:  63.5 kg (140 lb)          Physical Exam  Vitals signs and nursing note reviewed. Constitutional:       Appearance: He is well-developed. HENT:      Head: Normocephalic and atraumatic. Neck:      Musculoskeletal: Normal range of motion and neck supple. Cardiovascular:      Rate and Rhythm: Normal rate and regular rhythm. Heart sounds: Normal heart sounds. Pulmonary:      Effort: Pulmonary effort is normal. No respiratory distress. Breath sounds: Normal breath sounds. No wheezing or rales. Chest:      Chest wall: No tenderness. Abdominal:      General: Bowel sounds are normal.      Palpations: Abdomen is soft. Tenderness: There is no abdominal tenderness. There is right CVA tenderness and left CVA tenderness. There is no guarding. Musculoskeletal: Normal range of motion. Skin:     General: Skin is warm and dry. Findings: No erythema. Neurological:      Mental Status: He is alert and oriented to person, place, and time.    Psychiatric:         Behavior: Behavior normal. Thought Content: Thought content normal.         Judgment: Judgment normal.        [facility]       Procedures    Assessment & Plan:     Orders Placed This Encounter    URINE CULTURE HOLD SAMPLE    CT ABD PELV W CONT    URINALYSIS W/MICROSCOPIC    CBC WITH AUTOMATED DIFF    METABOLIC PANEL, COMPREHENSIVE    LIPASE    Hold Sample    sodium chloride 0.9 % bolus infusion 1,000 mL    ondansetron (ZOFRAN) injection 4 mg       Discussed with Eliana Vega MD,ED Provider    Alex Loza NP  02/18/20  12:09 PM            2:37 PM  Patient re-evaluated. All questions answered. Patient appropriate for discharge. Given return precautions and follow up instructions. LABORATORY TESTS:  Labs Reviewed   METABOLIC PANEL, COMPREHENSIVE - Abnormal; Notable for the following components:       Result Value    BUN/Creatinine ratio 7 (*)     All other components within normal limits   URINE CULTURE HOLD SAMPLE   URINALYSIS W/MICROSCOPIC   CBC WITH AUTOMATED DIFF   LIPASE   SAMPLES BEING HELD       IMAGING RESULTS:  CT ABD PELV W CONT   Final Result   IMPRESSION:   No acute intraperitoneal process          MEDICATIONS GIVEN:  Medications   sodium chloride 0.9 % bolus infusion 1,000 mL (0 mL IntraVENous IV Completed 2/18/20 1436)   ondansetron (ZOFRAN) injection 4 mg (4 mg IntraVENous Given 2/18/20 1218)   iopamidoL (ISOVUE-370) 76 % injection (100 mL  Given 2/18/20 1405)       IMPRESSION:  1. Non-intractable vomiting with nausea, unspecified vomiting type    2. Gastroenteritis, acute        PLAN:  1. Current Discharge Medication List      START taking these medications    Details   ondansetron (ZOFRAN ODT) 4 mg disintegrating tablet Take 1 Tab by mouth every eight (8) hours as needed for Nausea or Vomiting. Qty: 20 Tab, Refills: 0           2.    Follow-up Information     Follow up With Specialties Details Why [address] [place] [address],[address]  Schedule an appointment as soon as possible for a visit primary care referral [id number] [facility] [id number] [phone]    [facility] EMERGENCY DEPT Emergency Medicine  As needed, If symptoms worsen [address]  [phone]        3. Return to ED for new or worsening symptoms       Jayleen Wong NP      Please note that this dictation was completed with [employer], the computer voice recognition software. Quite often unanticipated grammatical, syntax, homophones, and other interpretive errors are inadvertently transcribed by the computer software. Please disregard these errors. Please excuse any errors that have escaped final proofreading.

## 2020-02-18 NOTE — ED TRIAGE NOTES
Pt here for headache & vomiting starting 1 hour after drinking \"juice from the store\"  last night. Denies fevers. Pt adds \"nervouseness\" and \"shakiness\" and \"lighheadeness\". Recently tx for UTi. Complains of bilateral flank pain.  Mild abd pain

## 2020-03-11 ENCOUNTER — APPOINTMENT (OUTPATIENT)
Dept: ULTRASOUND IMAGING | Age: 60
End: 2020-03-11
Attending: EMERGENCY MEDICINE
Payer: SUBSIDIZED

## 2020-03-11 ENCOUNTER — HOSPITAL ENCOUNTER (EMERGENCY)
Age: 60
Discharge: HOME | End: 2020-03-12
Attending: EMERGENCY MEDICINE | Admitting: EMERGENCY MEDICINE
Payer: SUBSIDIZED

## 2020-03-11 ENCOUNTER — APPOINTMENT (OUTPATIENT)
Dept: CT IMAGING | Age: 60
End: 2020-03-11
Attending: EMERGENCY MEDICINE
Payer: SUBSIDIZED

## 2020-03-11 DIAGNOSIS — R33.9 URINARY RETENTION: Primary | ICD-10-CM

## 2020-03-11 DIAGNOSIS — R10.84 ABDOMINAL PAIN, GENERALIZED: ICD-10-CM

## 2020-03-11 LAB
ALBUMIN SERPL-MCNC: 3.7 G/DL (ref 3.5–5)
ALBUMIN/GLOB SERPL: 1.3 {RATIO} (ref 1.1–2.2)
ALP SERPL-CCNC: 73 U/L (ref 45–117)
ALT SERPL-CCNC: 22 U/L (ref 12–78)
ANION GAP SERPL CALC-SCNC: 7 MMOL/L (ref 5–15)
APPEARANCE UR: ABNORMAL
AST SERPL-CCNC: 24 U/L (ref 15–37)
BACTERIA URNS QL MICRO: NEGATIVE /HPF
BASOPHILS # BLD: 0 K/UL (ref 0–0.1)
BASOPHILS NFR BLD: 0 % (ref 0–1)
BILIRUB SERPL-MCNC: 0.8 MG/DL (ref 0.2–1)
BILIRUB UR QL: NEGATIVE
BUN SERPL-MCNC: 5 MG/DL (ref 6–20)
BUN/CREAT SERPL: 5 (ref 12–20)
CALCIUM SERPL-MCNC: 8.2 MG/DL (ref 8.5–10.1)
CHLORIDE SERPL-SCNC: 104 MMOL/L (ref 97–108)
CO2 SERPL-SCNC: 28 MMOL/L (ref 21–32)
COLOR UR: ABNORMAL
COMMENT, HOLDF: NORMAL
CREAT SERPL-MCNC: 0.99 MG/DL (ref 0.7–1.3)
DIFFERENTIAL METHOD BLD: NORMAL
EOSINOPHIL # BLD: 0.1 K/UL (ref 0–0.4)
EOSINOPHIL NFR BLD: 2 % (ref 0–7)
EPITH CASTS URNS QL MICRO: ABNORMAL /LPF
ERYTHROCYTE [DISTWIDTH] IN BLOOD BY AUTOMATED COUNT: 13.5 % (ref 11.5–14.5)
GLOBULIN SER CALC-MCNC: 2.9 G/DL (ref 2–4)
GLUCOSE SERPL-MCNC: 126 MG/DL (ref 65–100)
GLUCOSE UR STRIP.AUTO-MCNC: NEGATIVE MG/DL
HCT VFR BLD AUTO: 40.7 % (ref 36.6–50.3)
HGB BLD-MCNC: 14.4 G/DL (ref 12.1–17)
HGB UR QL STRIP: NEGATIVE
HYALINE CASTS URNS QL MICRO: ABNORMAL /LPF (ref 0–5)
IMM GRANULOCYTES # BLD AUTO: 0 K/UL (ref 0–0.04)
IMM GRANULOCYTES NFR BLD AUTO: 0 % (ref 0–0.5)
KETONES UR QL STRIP.AUTO: NEGATIVE MG/DL
LEUKOCYTE ESTERASE UR QL STRIP.AUTO: NEGATIVE
LYMPHOCYTES # BLD: 1.8 K/UL (ref 0.8–3.5)
LYMPHOCYTES NFR BLD: 33 % (ref 12–49)
MCH RBC QN AUTO: 30.9 PG (ref 26–34)
MCHC RBC AUTO-ENTMCNC: 35.4 G/DL (ref 30–36.5)
MCV RBC AUTO: 87.3 FL (ref 80–99)
MONOCYTES # BLD: 0.4 K/UL (ref 0–1)
MONOCYTES NFR BLD: 7 % (ref 5–13)
NEUTS SEG # BLD: 3.1 K/UL (ref 1.8–8)
NEUTS SEG NFR BLD: 58 % (ref 32–75)
NITRITE UR QL STRIP.AUTO: NEGATIVE
NRBC # BLD: 0 K/UL (ref 0–0.01)
NRBC BLD-RTO: 0 PER 100 WBC
PH UR STRIP: 7 [PH] (ref 5–8)
PLATELET # BLD AUTO: 248 K/UL (ref 150–400)
PMV BLD AUTO: 9.8 FL (ref 8.9–12.9)
POTASSIUM SERPL-SCNC: 3.7 MMOL/L (ref 3.5–5.1)
PROT SERPL-MCNC: 6.6 G/DL (ref 6.4–8.2)
PROT UR STRIP-MCNC: NEGATIVE MG/DL
RBC # BLD AUTO: 4.66 M/UL (ref 4.1–5.7)
RBC #/AREA URNS HPF: ABNORMAL /HPF (ref 0–5)
SAMPLES BEING HELD,HOLD: NORMAL
SODIUM SERPL-SCNC: 139 MMOL/L (ref 136–145)
SP GR UR REFRACTOMETRY: 1 (ref 1–1.03)
UR CULT HOLD, URHOLD: NORMAL
UROBILINOGEN UR QL STRIP.AUTO: 0.2 EU/DL (ref 0.2–1)
WBC # BLD AUTO: 5.4 K/UL (ref 4.1–11.1)
WBC URNS QL MICRO: ABNORMAL /HPF (ref 0–4)

## 2020-03-11 PROCEDURE — 77030005513 HC CATH URETH FOL11 MDII -B

## 2020-03-11 PROCEDURE — 51702 INSERT TEMP BLADDER CATH: CPT

## 2020-03-11 PROCEDURE — 99284 EMERGENCY DEPT VISIT MOD MDM: CPT

## 2020-03-11 PROCEDURE — 36415 COLL VENOUS BLD VENIPUNCTURE: CPT

## 2020-03-11 PROCEDURE — 80053 COMPREHEN METABOLIC PANEL: CPT

## 2020-03-11 PROCEDURE — 81001 URINALYSIS AUTO W/SCOPE: CPT

## 2020-03-11 PROCEDURE — 76870 US EXAM SCROTUM: CPT

## 2020-03-11 PROCEDURE — 74176 CT ABD & PELVIS W/O CONTRAST: CPT

## 2020-03-11 PROCEDURE — 85025 COMPLETE CBC W/AUTO DIFF WBC: CPT

## 2020-03-11 RX ORDER — LIDOCAINE HYDROCHLORIDE 20 MG/ML
JELLY TOPICAL
Status: DISCONTINUED | OUTPATIENT
Start: 2020-03-11 | End: 2020-03-12 | Stop reason: HOSPADM

## 2020-03-11 NOTE — ED TRIAGE NOTES
Patient presents to ED for complaints of abdominal pain that radiates into testicles. States the pain has been constant in his testicles. Having difficulty with urination. Denies burning with urination, denies blood in urine. States this his third time being seen for this and says they have been unable to find anything in previous visits.  Per patient, he has been told follow up if symptoms persist.

## 2020-03-12 VITALS
OXYGEN SATURATION: 98 % | DIASTOLIC BLOOD PRESSURE: 79 MMHG | HEART RATE: 70 BPM | SYSTOLIC BLOOD PRESSURE: 118 MMHG | BODY MASS INDEX: 21.22 KG/M2 | RESPIRATION RATE: 20 BRPM | WEIGHT: 140 LBS | HEIGHT: 68 IN | TEMPERATURE: 97 F

## 2020-03-12 NOTE — ED NOTES
Roman catheter connected to leg bag. Patient instructed on use and care verbalized understanding. Patient given discharge instructions verbalized understanding.

## 2020-03-12 NOTE — ED NOTES
Bedside and Verbal shift change report given to [facility] S[facility] (oncoming nurse) by Anup Mccracken (offgoing nurse). Report included the following information SBAR and ED Summary, MAR, Recent Results.

## 2020-03-12 NOTE — ED PROVIDER NOTES
61 y.o. male with past medical history significant for BPH who presents from home with chief complaint of abdominal pain. Of note, patient states that he has been seen 3 times with the similar complaints and they have been unable to find anything. Patient also reports that he has been undergoing medical testing for the past 3 months and they have \"told the patient that there is nothing wrong with him. \" Patient states that he has been having abdominal pain that has been radiating to his testicles prompting patient to come to the ED today. Patient presents to [facility] ED with complaints of abdominal pain. Patient endorses testicle pain as a result of the abdominal pain. Patient endorses accompanying difficulty urinating. Patient denies hematuria and dysuria. Patien denies fever, nausea, and diarrhea. There are no other acute medical concerns at this time. Social hx: No tobacco use, No EtOH use, No illicit drug use. Note written by Gabriel Garcia, as dictated by Price Cordero MD 8:28 PM      The history is provided by the patient and medical records. A  was used (on the phone). Past Medical History:   Diagnosis Date    BPH (benign prostatic hyperplasia)        History reviewed. No pertinent surgical history.       Family History:   Problem Relation Age of Onset    Parkinsonism Mother     Arthritis-osteo Father     Diabetes Brother     Heart Disease Brother     Heart Surgery Brother        Social History     Socioeconomic History    Marital status: SINGLE     Spouse name: Not on file    Number of children: Not on file    Years of education: Not on file    Highest education level: Not on file   Occupational History    Not on file   Social Needs    Financial resource strain: Not on file    Food insecurity     Worry: Not on file     Inability: Not on file    Transportation needs     Medical: Not on file     Non-medical: Not on file   Tobacco Use    Smoking status: Never Smoker    Smokeless tobacco: Never Used   Substance and Sexual Activity    Alcohol use: No     Alcohol/week: 0.0 standard drinks    Drug use: No    Sexual activity: Yes     Partners: Female   Lifestyle    Physical activity     Days per week: Not on file     Minutes per session: Not on file    Stress: Not on file   Relationships    Social connections     Talks on phone: Not on file     Gets together: Not on file     Attends Synagogue service: Not on file     Active member of club or organization: Not on file     Attends meetings of clubs or organizations: Not on file     Relationship status: Not on file    Intimate partner violence     Fear of current or ex partner: Not on file     Emotionally abused: Not on file     Physically abused: Not on file     Forced sexual activity: Not on file   Other Topics Concern    Not on file   Social History Narrative    ** Merged History Encounter **         ** Merged History Encounter **              ALLERGIES: Patient has no known allergies. Review of Systems   Constitutional: Negative for chills and fever. Respiratory: Negative for shortness of breath. Cardiovascular: Negative for chest pain. Gastrointestinal: Positive for abdominal pain. Negative for constipation, diarrhea and vomiting. Genitourinary: Positive for difficulty urinating and penile pain. Negative for dysuria and hematuria. Neurological: Negative for dizziness and light-headedness. All other systems reviewed and are negative. Vitals:    03/11/20 1949   BP: 122/74   Pulse: 73   Resp: 18   Temp: 97.6 °F (36.4 °C)   SpO2: 99%   Weight: 63.5 kg (140 lb)   Height: 5' 8.11\" (1.73 m)            Physical Exam  Vitals signs and nursing note reviewed. Constitutional:       General: He is not in acute distress. Appearance: He is well-developed. HENT:      Head: Normocephalic and atraumatic.    Eyes:      Conjunctiva/sclera: Conjunctivae normal.      Pupils: Pupils are equal, round, and reactive to light. Neck:      Musculoskeletal: Normal range of motion. Cardiovascular:      Rate and Rhythm: Normal rate and regular rhythm. Heart sounds: Normal heart sounds. Pulmonary:      Effort: Pulmonary effort is normal.      Breath sounds: Normal breath sounds. Abdominal:      General: There is no distension. Palpations: Abdomen is soft. Tenderness: There is no abdominal tenderness. Musculoskeletal: Normal range of motion. Skin:     General: Skin is dry. Capillary Refill: Capillary refill takes less than 2 seconds. Neurological:      Mental Status: He is alert and oriented to person, place, and time. Note written by Gabriel Hernandez, as dictated by Gilbert Lorenzo MD 8:28 PM    MDM  Number of Diagnoses or Management Options  Abdominal pain, generalized:   Urinary retention:   Diagnosis management comments: The patient is resting comfortably and feels better, is alert and in no distress. The repeat examination is unremarkable and benign; in particular, there is no discomfort at McBurney's point. The history, exam, diagnostic testing, and current condition do not suggest acute appendicitis, bowel obstruction, incarcerated hernia, acute cholecystitis, bowel perforation, major gastrointestinal bleeding, severe diverticulitis, testicular torsion, sepsis, or other significant pathology to warrant further testing, continued ED treatment, admission, or surgical evaluation at this point. The vital signs have been stable and are within normal limits at this time. The patient does not have uncontrollable pain, intractable vomiting, or other significant symptoms. The patient's condition is stable and appropriate for discharge. The patient will pursue further outpatient evaluation with the primary care physician or other designated or consulting physician as indicated in the discharge instructions.            Procedures

## 2020-03-20 ENCOUNTER — HOSPITAL ENCOUNTER (EMERGENCY)
Age: 60
Discharge: HOME | End: 2020-03-20
Attending: EMERGENCY MEDICINE | Admitting: EMERGENCY MEDICINE
Payer: SUBSIDIZED

## 2020-03-20 VITALS
OXYGEN SATURATION: 98 % | DIASTOLIC BLOOD PRESSURE: 67 MMHG | RESPIRATION RATE: 18 BRPM | SYSTOLIC BLOOD PRESSURE: 130 MMHG | HEIGHT: 69 IN | TEMPERATURE: 98.5 F | WEIGHT: 140 LBS | HEART RATE: 88 BPM | BODY MASS INDEX: 20.73 KG/M2

## 2020-03-20 DIAGNOSIS — N39.0 URINARY TRACT INFECTION WITHOUT HEMATURIA, SITE UNSPECIFIED: Primary | ICD-10-CM

## 2020-03-20 LAB
ALBUMIN SERPL-MCNC: 3.6 G/DL (ref 3.5–5)
ALBUMIN/GLOB SERPL: 1 {RATIO} (ref 1.1–2.2)
ALP SERPL-CCNC: 88 U/L (ref 45–117)
ALT SERPL-CCNC: 21 U/L (ref 12–78)
ANION GAP SERPL CALC-SCNC: 8 MMOL/L (ref 5–15)
APPEARANCE UR: CLEAR
AST SERPL-CCNC: 21 U/L (ref 15–37)
BACTERIA URNS QL MICRO: NEGATIVE /HPF
BASOPHILS # BLD: 0 K/UL (ref 0–0.1)
BASOPHILS NFR BLD: 0 % (ref 0–1)
BILIRUB SERPL-MCNC: 1.4 MG/DL (ref 0.2–1)
BILIRUB UR QL: NEGATIVE
BUN SERPL-MCNC: 8 MG/DL (ref 6–20)
BUN/CREAT SERPL: 8 (ref 12–20)
CALCIUM SERPL-MCNC: 8.6 MG/DL (ref 8.5–10.1)
CHLORIDE SERPL-SCNC: 102 MMOL/L (ref 97–108)
CO2 SERPL-SCNC: 24 MMOL/L (ref 21–32)
COLOR UR: ABNORMAL
COMMENT, HOLDF: NORMAL
CREAT SERPL-MCNC: 1.02 MG/DL (ref 0.7–1.3)
DIFFERENTIAL METHOD BLD: ABNORMAL
EOSINOPHIL # BLD: 0 K/UL (ref 0–0.4)
EOSINOPHIL NFR BLD: 0 % (ref 0–7)
EPITH CASTS URNS QL MICRO: ABNORMAL /LPF
ERYTHROCYTE [DISTWIDTH] IN BLOOD BY AUTOMATED COUNT: 13.2 % (ref 11.5–14.5)
GLOBULIN SER CALC-MCNC: 3.5 G/DL (ref 2–4)
GLUCOSE SERPL-MCNC: 183 MG/DL (ref 65–100)
GLUCOSE UR STRIP.AUTO-MCNC: NEGATIVE MG/DL
HCT VFR BLD AUTO: 38.3 % (ref 36.6–50.3)
HGB BLD-MCNC: 13.5 G/DL (ref 12.1–17)
HGB UR QL STRIP: ABNORMAL
HYALINE CASTS URNS QL MICRO: ABNORMAL /LPF (ref 0–5)
IMM GRANULOCYTES # BLD AUTO: 0.2 K/UL (ref 0–0.04)
IMM GRANULOCYTES NFR BLD AUTO: 1 % (ref 0–0.5)
KETONES UR QL STRIP.AUTO: NEGATIVE MG/DL
LEUKOCYTE ESTERASE UR QL STRIP.AUTO: ABNORMAL
LYMPHOCYTES # BLD: 0.5 K/UL (ref 0.8–3.5)
LYMPHOCYTES NFR BLD: 3 % (ref 12–49)
MCH RBC QN AUTO: 30.7 PG (ref 26–34)
MCHC RBC AUTO-ENTMCNC: 35.2 G/DL (ref 30–36.5)
MCV RBC AUTO: 87 FL (ref 80–99)
MONOCYTES # BLD: 1 K/UL (ref 0–1)
MONOCYTES NFR BLD: 6 % (ref 5–13)
NEUTS SEG # BLD: 15.2 K/UL (ref 1.8–8)
NEUTS SEG NFR BLD: 90 % (ref 32–75)
NITRITE UR QL STRIP.AUTO: NEGATIVE
NRBC # BLD: 0 K/UL (ref 0–0.01)
NRBC BLD-RTO: 0 PER 100 WBC
PH UR STRIP: 7 [PH] (ref 5–8)
PLATELET # BLD AUTO: 214 K/UL (ref 150–400)
PMV BLD AUTO: 10 FL (ref 8.9–12.9)
POTASSIUM SERPL-SCNC: 3.7 MMOL/L (ref 3.5–5.1)
PROT SERPL-MCNC: 7.1 G/DL (ref 6.4–8.2)
PROT UR STRIP-MCNC: NEGATIVE MG/DL
RBC # BLD AUTO: 4.4 M/UL (ref 4.1–5.7)
RBC #/AREA URNS HPF: ABNORMAL /HPF (ref 0–5)
RBC MORPH BLD: ABNORMAL
SAMPLES BEING HELD,HOLD: NORMAL
SODIUM SERPL-SCNC: 134 MMOL/L (ref 136–145)
SP GR UR REFRACTOMETRY: 1 (ref 1–1.03)
UR CULT HOLD, URHOLD: NORMAL
UROBILINOGEN UR QL STRIP.AUTO: 0.2 EU/DL (ref 0.2–1)
WBC # BLD AUTO: 16.9 K/UL (ref 4.1–11.1)
WBC URNS QL MICRO: ABNORMAL /HPF (ref 0–4)

## 2020-03-20 PROCEDURE — 74011000250 HC RX REV CODE- 250: Performed by: EMERGENCY MEDICINE

## 2020-03-20 PROCEDURE — 87186 SC STD MICRODIL/AGAR DIL: CPT

## 2020-03-20 PROCEDURE — 99282 EMERGENCY DEPT VISIT SF MDM: CPT

## 2020-03-20 PROCEDURE — 87077 CULTURE AEROBIC IDENTIFY: CPT

## 2020-03-20 PROCEDURE — 85025 COMPLETE CBC W/AUTO DIFF WBC: CPT

## 2020-03-20 PROCEDURE — 87086 URINE CULTURE/COLONY COUNT: CPT

## 2020-03-20 PROCEDURE — 96374 THER/PROPH/DIAG INJ IV PUSH: CPT

## 2020-03-20 PROCEDURE — 80053 COMPREHEN METABOLIC PANEL: CPT

## 2020-03-20 PROCEDURE — 81001 URINALYSIS AUTO W/SCOPE: CPT

## 2020-03-20 PROCEDURE — 74011250636 HC RX REV CODE- 250/636: Performed by: EMERGENCY MEDICINE

## 2020-03-20 RX ORDER — CEFDINIR 300 MG/1
300 CAPSULE ORAL 2 TIMES DAILY
Qty: 20 CAP | Refills: 0 | Status: SHIPPED | OUTPATIENT
Start: 2020-03-20 | End: 2020-03-30

## 2020-03-20 RX ADMIN — WATER 1 G: 1 INJECTION INTRAMUSCULAR; INTRAVENOUS; SUBCUTANEOUS at 19:44

## 2020-03-20 NOTE — DISCHARGE INSTRUCTIONS
Patient Education        Infecciones urinarias en los hombres: Instrucciones de cuidado  Urinary Tract Infections in Men: Care Instructions  Instrucciones de cuidado    La infección urinaria (UTI, por amena siglas en inglés) es un término general que se [place] para designar gavin infección que se produce en cualquier lugar entre los riñones y la punta del pene. Las UTI [employer] ser el resultado de un problema de próstata. La mayoría causan dolor o ardor al orinar. La mayoría de las UTI son causadas por bacterias y se pueden curar con antibióticos. Es importante que complete el tratamiento para que la infección no empeore. La atención de seguimiento es gavin parte clave de sams tratamiento y seguridad. Asegúrese de hacer y acudir a todas las citas, y llame a sams médico si está teniendo problemas. También es gavin buena idea saber los resultados de amena exámenes y mantener gavin lista de los medicamentos que alesia. ¿Cómo puede cuidarse en el hogar? · [employer] antibióticos katrin le fueron recetados. No deje de tomarlos por el hecho de sentirse mejor. Debe dhiraj todos los antibióticos hasta terminarlos. · [employer] medicamentos exactamente katrin le fueron recetados. Sams médico puede haberle recetado un medicamento, katrin fenazopiridina (Pyridium), para ayudar a aliviar el dolor cuando [facility]. [place] torna la orina de color anaranjado. Puede dejar de tomarlo cuando los síntomas mejoren. Asegúrese de dhiraj todos los antibióticos para tratar la infección. · [employer] cantidad de agua lacy los siguientes merissa o [address]. [place] le ayudará a producir orina menos concentrada y a eliminar las bacterias que causan la infección. (Si tiene gavin enfermedad de los riñones, del corazón o del hígado y tiene que [place] líquidos, hable con sams médico antes de aumentar sams consumo). · Evite las bebidas carbonatadas o que contengan cafeína. Pueden irritar la vejiga. · Orine con frecuencia. Trate de vaciar la vejiga cada vez que orine.   · Para aliviar el dolor, tome un baño caliente o colóquese gavin almohadilla térmica (a baja temperatura) sobre la parte baja del abdomen o la alonso genital. Nunca se duerma mientras [place] gavin almohadilla térmica. Cómo ayudar a prevenir las infecciones urinarias  · Laura abundantes líquidos, los suficientes para que nixon orina sea de color amarillo alycia o transparente katrin el agua. Si tiene gavin enfermedad del riñón, del corazón o del hígado y tiene que [employer] líquidos, hable con nixon médico antes de aumentar nixon consumo. · Orine en cuanto sienta la necesidad de hacerlo. No retenga la [employer]. Orine antes de irse a dormir. · Mantenga el pene limpio. Cuidado de la sonda  Si tiene colocado un tubo de drenaje (sonda), las siguientes medidas le ayudarán a cuidarlo. · Lávese siempre las carmela antes y después de tocar la sonda. · Revise la alonso que rodea la uretra para asegurarse de que no haya inflamación ni señales de infección. Las señales de infección incluyen piel [place], [place], enrojecida o adolorida, o pus alrededor [place]. · Limpie la alonso que rodea a la sonda con agua y [address] veces al día. Después séquela con gavin toalla limpia. · No se aplique talco ni crema en la piel alrededor de la sonda. Cómo vaciar la bolsa de recolección de [facility]  · [employer] carmela con agua y [place]. · Sin tocar el sravan de drenaje de la bolsa, extráigalo de la manga que se encuentra en la parte inferior de la bolsa de recolección. Rm la válvula del sravan de [place]. · Deje que la orina salga de la bolsa hacia el inodoro o un recipiente. No permita que el tubo o el sravan de [place] toque nada. · Después de vaciar la bolsa, limpie el extremo del sravan de drenaje con agua y papel tisú. Cierre la válvula y vuelva a colocar el sravan de drenaje dentro de la manga que se encuentra en la parte inferior de la bolsa de recolección. · [employer] carmela con agua y [place]. ¿Cuándo debe pedir ayuda?   Llame a nixon médico ahora mismo o busque atención médica inmediata si:    · Tiene síntomas katrin fiebre, escalofríos, náuseas o vómito que ocurren por primera vez o que empeoran.     · Tiene dolor de espalda nuevo radha debajo de las costillas. [place] se llama dolor en el flanco.     · Tiene chavez o pus nuevos en la orina.     · No puede dhiraj los antibióticos o mantenerlos en el estómago.    Preste especial atención a los cambios en nixon amy y asegúrese de comunicarse con nixon médico si:    · No mejora después de astrid tomado un antibiótico lacy 2 días.     · Mariya síntomas desaparecen y [employer]. ¿Dónde puede encontrar más información en inglés? Vaya a [email or web address]  Florencio Thrasher [id number] en la búsqueda para aprender más acerca de \"Infecciones urinarias en los hombres: Instrucciones de cuidado. \"  Revisado: 21 agosto, 2019Versión del contenido: 12.4  © 0634-2635 [employer], [employer]. Las instrucciones de cuidado fueron adaptadas bajo licencia por [employer] (which disclaims liability or warranty for this information). Si usted tiene [place] afección médica o sobre estas instrucciones, siempre pregunte a nixon profesional de amy. [employer] niega toda garantía o responsabilidad por nixon uso de esta información.

## 2020-03-20 NOTE — ED PROVIDER NOTES
HPI     59-year-old male here with dysuria and fever. Patient states he had a Roman catheter placed on March 11 for urinary retention. Urinalysis negative on that day. He had CT abdomen pelvis also done which I have reviewed. CT abdomen pelvis showed no evidence of acute process. There is a large volume of stool. Markedly distended bladder. Patient had the Roman catheter removed 2 days ago by urology. He had a subjective fever last night. No temperature taken. He took ibuprofen last night at 3 AM with relief of this fever. No ongoing hematuria. He states that he is able to urinate well. He is currently taking Flomax. Denies vomiting, diarrhea, cough, shortness of breath, sick contact, recent travel or any other complaints. Social history: Non-smoker. No alcohol or drug use. Past Medical History:   Diagnosis Date    BPH (benign prostatic hyperplasia)        No past surgical history on file.       Family History:   Problem Relation Age of Onset    Parkinsonism Mother     Arthritis-osteo Father     Diabetes Brother     Heart Disease Brother     Heart Surgery Brother        Social History     Socioeconomic History    Marital status: SINGLE     Spouse name: Not on file    Number of children: Not on file    Years of education: Not on file    Highest education level: Not on file   Occupational History    Not on file   Social Needs    Financial resource strain: Not on file    Food insecurity     Worry: Not on file     Inability: Not on file   [employer] needs     Medical: Not on file     Non-medical: Not on file   Tobacco Use    Smoking status: Never Smoker    Smokeless tobacco: Never Used   Substance and Sexual Activity    Alcohol use: No     Alcohol/week: 0.0 standard drinks    Drug use: No    Sexual activity: Yes     Partners: Female   Lifestyle    Physical activity     Days per week: Not on file     Minutes per session: Not on file    Stress: Not on file   Relationships    Social connections     Talks on phone: Not on file     Gets together: Not on file     Attends Voodoo service: Not on file     Active member of club or organization: Not on file     Attends meetings of clubs or organizations: Not on file     Relationship status: Not on file    Intimate partner violence     Fear of current or ex partner: Not on file     Emotionally abused: Not on file     Physically abused: Not on file     Forced sexual activity: Not on file   Other Topics Concern    Not on file   Social History Narrative    ** Merged History Encounter **         ** Merged History Encounter **              ALLERGIES: Patient has no known allergies. Review of Systems   Constitutional: Positive for fever. Respiratory: Negative for shortness of breath. Gastrointestinal: Negative for abdominal pain. Genitourinary: Positive for dysuria, frequency and urgency. Skin: Negative for rash. All other systems reviewed and are negative. Vitals:    03/20/20 1736   BP: 121/75   Pulse: 98   Resp: 14   Temp: 99.7 °F (37.6 °C)   SpO2: 97%   Weight: 63.5 kg (140 lb)   Height: 5' 9\" (1.753 m)            Physical Exam     Nursing note and vitals reviewed. Constitutional: appears well-developed and well-nourished. No distress. HENT:   Head: Normocephalic and atraumatic. Sclera anicteric  Nose: No rhinorrhea  Mouth/Throat: Oropharynx is clear and moist. Pharynx normal  Eyes: Conjunctivae are normal. Pupils are equal, round, and reactive to light. Right eye exhibits no discharge. Left eye exhibits no discharge. No scleral icterus. Neck: Painless normal range of motion. Supple  Cardiovascular: Normal rate, regular rhythm, normal heart sounds and intact distal pulses. Exam reveals no gallop and no friction rub. No murmur heard. Pulmonary/Chest: Effort normal and breath sounds normal. No respiratory distress. no wheezes. no rales. Abdominal: Soft. Bowel sounds are normal. Exhibits no distension and no mass.  No tenderness. No guarding. NO CVA TENDERNESS. Musculoskeletal: Normal range of motion. no tenderness. No edema  Lymphadenopathy:   No cervical adenopathy. Neurological:  Alert and oriented to person, place, and time. Coordination normal. CN 2-12 intact. Moving all extremities. Skin: Skin is warm and dry. No rash noted. No pallor. MDM   80-year-old male with recent Roman catheter now with dysuria and reported subjective fever. Urinalysis with large leuk esterase and 20-50 WBCs suggestive of UTI. Patient with leukocytosis. Not tachycardic. Still appears well-hydrated. Renal function is normal.  Will give IV Rocephin and treat with Omnicef. Urine culture ordered. Procedures     7:25 PM  Patient's results have been reviewed with them. Patient and/or family have verbally conveyed their understanding and agreement of the patient's signs, symptoms, diagnosis, treatment and prognosis and additionally agree to follow up as recommended or return to the Emergency Room should their condition change prior to follow-up. Discharge instructions have also been provided to the patient with some educational information regarding their diagnosis as well a list of reasons why they would want to return to the ER prior to their follow-up appointment should their condition change. Recent Results (from the past 24 hour(s))   SAMPLES BEING HELD    Collection Time: 03/20/20  5:53 PM   Result Value Ref Range    SAMPLES BEING HELD       1PST,1SST,1RED,1LAV,1BLU,1UA,1UC 2BC (PURPLE, BLUE)    COMMENT        Add-on orders for these samples will be processed based on acceptable specimen integrity and analyte stability, which may vary by analyte.    URINALYSIS W/MICROSCOPIC    Collection Time: 03/20/20  5:53 PM   Result Value Ref Range    Color YELLOW/STRAW      Appearance CLEAR CLEAR      Specific gravity 1.005 1.003 - 1.030      pH (UA) 7.0 5.0 - 8.0      Protein NEGATIVE  NEG mg/dL    Glucose NEGATIVE  NEG mg/dL Ketone NEGATIVE  NEG mg/dL    Bilirubin NEGATIVE  NEG      Blood SMALL (A) NEG      Urobilinogen 0.2 0.2 - 1.0 EU/dL    Nitrites NEGATIVE  NEG      Leukocyte Esterase LARGE (A) NEG      WBC 20-50 0 - 4 /hpf    RBC 0-5 0 - 5 /hpf    Epithelial cells FEW FEW /lpf    Bacteria NEGATIVE  NEG /hpf    Hyaline cast 0-2 0 - 5 /lpf   URINE CULTURE HOLD SAMPLE    Collection Time: 03/20/20  5:53 PM   Result Value Ref Range    Urine culture hold        Urine on hold in Microbiology dept for 2 days. If unpreserved urine is submitted, it cannot be used for addtional testing after 24 hours, recollection will be required. CBC WITH AUTOMATED DIFF    Collection Time: 03/20/20  5:53 PM   Result Value Ref Range    WBC 16.9 (H) 4.1 - 11.1 K/uL    RBC 4.40 4. 10 - 5.70 M/uL    HGB 13.5 12.1 - 17.0 g/dL    HCT 38.3 36.6 - 50.3 %    MCV 87.0 80.0 - 99.0 FL    MCH 30.7 26.0 - 34.0 PG    MCHC 35.2 30.0 - 36.5 g/dL    RDW 13.2 11.5 - 14.5 %    PLATELET 416 253 - 279 K/uL    MPV 10.0 8.9 - 12.9 FL    NRBC 0.0 0  WBC    ABSOLUTE NRBC 0.00 0.00 - 0.01 K/uL    NEUTROPHILS PENDING %    LYMPHOCYTES PENDING %    MONOCYTES PENDING %    EOSINOPHILS PENDING %    BASOPHILS PENDING %    IMMATURE GRANULOCYTES PENDING %    ABS. NEUTROPHILS PENDING K/UL    ABS. LYMPHOCYTES PENDING K/UL    ABS. MONOCYTES PENDING K/UL    ABS. EOSINOPHILS PENDING K/UL    ABS. BASOPHILS PENDING K/UL    ABS. IMM. GRANS.  PENDING K/UL    DF PENDING    METABOLIC PANEL, COMPREHENSIVE    Collection Time: 03/20/20  5:53 PM   Result Value Ref Range    Sodium 134 (L) 136 - 145 mmol/L    Potassium 3.7 3.5 - 5.1 mmol/L    Chloride 102 97 - 108 mmol/L    CO2 24 21 - 32 mmol/L    Anion gap 8 5 - 15 mmol/L    Glucose 183 (H) 65 - 100 mg/dL    BUN 8 6 - 20 MG/DL    Creatinine 1.02 0.70 - 1.30 MG/DL    BUN/Creatinine ratio 8 (L) 12 - 20      GFR est AA >60 >60 ml/min/1.73m2    GFR est non-AA >60 >60 ml/min/1.73m2    Calcium 8.6 8.5 - 10.1 MG/DL    Bilirubin, total 1.4 (H) 0.2 - 1.0 MG/DL    ALT (SGPT) 21 12 - 78 U/L    AST (SGOT) 21 15 - 37 U/L    Alk. phosphatase 88 45 - 117 U/L    Protein, total 7.1 6.4 - 8.2 g/dL    Albumin 3.6 3.5 - 5.0 g/dL    Globulin 3.5 2.0 - 4.0 g/dL    A-G Ratio 1.0 (L) 1.1 - 2.2         No results found.

## 2020-03-23 ENCOUNTER — PATIENT OUTREACH (OUTPATIENT)
Dept: FAMILY MEDICINE CLINIC | Age: 60
End: 2020-03-23

## 2020-03-23 LAB
BACTERIA SPEC CULT: ABNORMAL
CC UR VC: ABNORMAL
SERVICE CMNT-IMP: ABNORMAL

## 2020-03-31 ENCOUNTER — TELEPHONE (OUTPATIENT)
Dept: FAMILY MEDICINE CLINIC | Age: 60
End: 2020-03-31

## 2020-03-31 ENCOUNTER — OFFICE VISIT (OUTPATIENT)
Dept: FAMILY MEDICINE CLINIC | Age: 60
End: 2020-03-31

## 2020-03-31 DIAGNOSIS — R13.13 PHARYNGEAL DYSPHAGIA: ICD-10-CM

## 2020-03-31 DIAGNOSIS — R19.06 EPIGASTRIC FULLNESS: ICD-10-CM

## 2020-03-31 RX ORDER — PHENOL/SODIUM PHENOLATE
40 AEROSOL, SPRAY (ML) MUCOUS MEMBRANE
Qty: 60 TAB | Refills: 1 | Status: SHIPPED | OUTPATIENT
Start: 2020-03-31 | End: 2020-04-01 | Stop reason: SDUPTHER

## 2020-03-31 NOTE — PROGRESS NOTES
[phone]; voice mail box that has not been set up yet. [phone] \"I'm sorry, the party you are trying to call is not available. \"  [phone] Fabian Avila left message. 3/31/2020  Mick Thomason was not reached by telephone. Patient then called the office back and we called him back. 3/31/2020  Fred Zhong consented to Telephone visit. [phone]  Subjective:   Mick Thomason is a 61 y.o. OTHER Romansh-speaking male.  Sabino Lam utilized. Chief Complaint   Patient presents with    Abdominal Pain   He has a burning in his stomach and can't eat anything. Feels that his stomach is full all the time. He called his doctor in [place]. That doctor recommended a medicine for him called thien. No blood in the stool. No vomiting. No difficulty swallowing. For more than 1 month he has had problems eating. States he felt better on the medicine they gave him. He had a catheter, that was taken out, taking a medicine now for his prostate, flomax. He has a follow up with urology in April.  -does he endorse weight loss? Yes  Having diarrhea? no  Constipation? No  Says his urine symptoms are improved. Specific concerns today: easily feeling full. ROS:    No dyspnea or chest pain on exertion. No change in bowel habits, black or bloody stools. No neurological complaints. Current Outpatient Medications   Medication Sig Dispense Refill    Omeprazole delayed release (PRILOSEC D/R) 20 mg tablet Take 2 Tabs by mouth Daily (before breakfast). For your stomach. [place] 2 tabletas gavin vez al [place] antes de desayuno para nixon estomago. 60 Tab 1    ondansetron (ZOFRAN ODT) 4 mg disintegrating tablet Take 1 Tab by mouth every eight (8) hours as needed for Nausea or Vomiting. 20 Tab 0      reports that he has never smoked. He has never used smokeless tobacco. He reports that he does not drink alcohol or use drugs. Objective:    There were no vitals filed for this visit. No results found for any visits on 03/31/20. Physical exam not performed due to telephone visit. Assessment/Plan:   Diagnoses and all orders for this visit:    1. Pharyngeal dysphagia  -     Omeprazole delayed release (PRILOSEC D/R) 20 mg tablet; Take 2 Tabs by mouth Daily (before breakfast). For your stomach. [place] 2 tabletas gavin vez al [place] antes de desayuno para nixon estomago.  -     REFERRAL TO GASTROENTEROLOGY    2. Epigastric fullness  -     Omeprazole delayed release (PRILOSEC D/R) 20 mg tablet; Take 2 Tabs by mouth Daily (before breakfast). For your stomach.   [place] 2 tabletas gavin vez al [place] antes de desayuno para nixon estomago.  -     REFERRAL TO GASTROENTEROLOGY

## 2020-04-01 ENCOUNTER — TELEPHONE (OUTPATIENT)
Dept: FAMILY MEDICINE CLINIC | Age: 60
End: 2020-04-01

## 2020-04-01 ENCOUNTER — OFFICE VISIT (OUTPATIENT)
Dept: FAMILY MEDICINE CLINIC | Age: 60
End: 2020-04-01

## 2020-04-01 DIAGNOSIS — R19.06 EPIGASTRIC FULLNESS: ICD-10-CM

## 2020-04-01 DIAGNOSIS — R13.13 PHARYNGEAL DYSPHAGIA: ICD-10-CM

## 2020-04-01 DIAGNOSIS — Z71.89 COUNSELING AND COORDINATION OF CARE: Primary | ICD-10-CM

## 2020-04-01 RX ORDER — PHENOL/SODIUM PHENOLATE
40 AEROSOL, SPRAY (ML) MUCOUS MEMBRANE
Qty: 60 TAB | Refills: 1 | Status: SHIPPED | OUTPATIENT
Start: 2020-04-01 | End: 2020-06-02

## 2020-04-01 NOTE — TELEPHONE ENCOUNTER
----- Message from Sandra Koch sent at 4/1/2020 10:21 AM EDT -----  Regarding: Meds  Jessy can you check this. The patient said that the medicine supposed to be send it to [facility] because was cheaper. He said that Leigh Powers () told him that at [facility] is $30. He can't paid that, that is why they sent a coupon for [facility] but he went twice yesterday and nothing is there. I can see that was sending to [address] but patient is asking for change.  Thanks  [email or web address]

## 2020-04-01 NOTE — TELEPHONE ENCOUNTER
The pt has been called several times and the emergency contact as well. The pt needs to let us know which [facility] he wants the prescription sent to or he can contact rhe Nathanaeloger and request his rx be transferred from the [facility] to them. The CBN was sent a message again from registrar to send the medication rx to [facility] instead of [facility]. There is not a [facility] listed in his preferred Pharmacies. Only [facility] were listed. The registrar was sent a message to ask the pt what [employer] does he wish to have the rx transferred to if he calls back, or let him know he can have the rx transferred himself to any Pharmacy he prefers. He just has to ask the [facility] of his choice to do it. Registrar messaged they would call the pt and ask him.  Marquis Painter RN

## 2020-04-01 NOTE — PROGRESS NOTES
Called patient to start AN financial screening. Patient did not answer nor has a set up VM. Will call back and try to re-schedule.

## 2020-04-01 NOTE — TELEPHONE ENCOUNTER
Anup Mims  Call main office 3/31/2020 and left a VM patient stated that he was at [facility] and his medicine was not at the pharmacy  And he want to know when can he  the medicine .     Thank you   [employer]

## 2020-04-01 NOTE — TELEPHONE ENCOUNTER
Tc to the pt phone #'s listed 2 x, and th emergency contact 1x. No answer on either phones. No opportunity to leave a message.  Gume Maldonado RN

## 2020-04-01 NOTE — TELEPHONE ENCOUNTER
Pt returned called and stated he would like to have the medication Omeprazole rx transferred to the [facility] on [address]. The medication was reordered to [facility] on [address].  Debora Rivas RN

## 2020-04-02 ENCOUNTER — OFFICE VISIT (OUTPATIENT)
Dept: FAMILY MEDICINE CLINIC | Age: 60
End: 2020-04-02

## 2020-04-02 DIAGNOSIS — Z71.89 COUNSELING AND COORDINATION OF CARE: Primary | ICD-10-CM

## 2020-04-02 NOTE — PROGRESS NOTES
VV. Spoke to patient to start AN financial screening. Patient is aware of forms in the mail and has agreed to mail them back after he signs them.

## 2020-04-03 ENCOUNTER — PATIENT OUTREACH (OUTPATIENT)
Dept: FAMILY MEDICINE CLINIC | Age: 60
End: 2020-04-03

## 2020-04-23 ENCOUNTER — OFFICE VISIT (OUTPATIENT)
Dept: FAMILY MEDICINE CLINIC | Age: 60
End: 2020-04-23

## 2020-04-23 DIAGNOSIS — Z71.89 COUNSELING AND COORDINATION OF CARE: Primary | ICD-10-CM

## 2020-04-23 NOTE — PROGRESS NOTES
VV. AN financial screening has been completed. Will be submitted via email to [facility]. Patient was instructed to call for his appointment 5/6/2020.

## 2020-05-18 DIAGNOSIS — N41.0 ACUTE PROSTATITIS: Primary | ICD-10-CM

## 2020-05-26 ENCOUNTER — HOSPITAL ENCOUNTER (EMERGENCY)
Age: 60
Discharge: HOME | End: 2020-05-26
Attending: EMERGENCY MEDICINE
Payer: SUBSIDIZED

## 2020-05-26 VITALS
RESPIRATION RATE: 16 BRPM | TEMPERATURE: 98.5 F | OXYGEN SATURATION: 100 % | HEART RATE: 80 BPM | SYSTOLIC BLOOD PRESSURE: 125 MMHG | DIASTOLIC BLOOD PRESSURE: 78 MMHG

## 2020-05-26 DIAGNOSIS — R52 GENERALIZED BODY ACHES: Primary | ICD-10-CM

## 2020-05-26 DIAGNOSIS — R19.8 ABDOMINAL FULLNESS: ICD-10-CM

## 2020-05-26 DIAGNOSIS — R10.13 ABDOMINAL PAIN, EPIGASTRIC: ICD-10-CM

## 2020-05-26 LAB
ALBUMIN SERPL-MCNC: 3.6 G/DL (ref 3.5–5)
ALBUMIN/GLOB SERPL: 1.1 {RATIO} (ref 1.1–2.2)
ALP SERPL-CCNC: 78 U/L (ref 45–117)
ALT SERPL-CCNC: 41 U/L (ref 12–78)
ANION GAP SERPL CALC-SCNC: 6 MMOL/L (ref 5–15)
AST SERPL-CCNC: 27 U/L (ref 15–37)
BASOPHILS # BLD: 0 K/UL (ref 0–0.1)
BASOPHILS NFR BLD: 0 % (ref 0–1)
BILIRUB SERPL-MCNC: 0.7 MG/DL (ref 0.2–1)
BUN SERPL-MCNC: 10 MG/DL (ref 6–20)
BUN/CREAT SERPL: 11 (ref 12–20)
CALCIUM SERPL-MCNC: 8.5 MG/DL (ref 8.5–10.1)
CHLORIDE SERPL-SCNC: 108 MMOL/L (ref 97–108)
CO2 SERPL-SCNC: 26 MMOL/L (ref 21–32)
CREAT SERPL-MCNC: 0.88 MG/DL (ref 0.7–1.3)
DIFFERENTIAL METHOD BLD: ABNORMAL
EOSINOPHIL # BLD: 0.2 K/UL (ref 0–0.4)
EOSINOPHIL NFR BLD: 4 % (ref 0–7)
ERYTHROCYTE [DISTWIDTH] IN BLOOD BY AUTOMATED COUNT: 14.6 % (ref 11.5–14.5)
GLOBULIN SER CALC-MCNC: 3.2 G/DL (ref 2–4)
GLUCOSE SERPL-MCNC: 123 MG/DL (ref 65–100)
HCT VFR BLD AUTO: 40.7 % (ref 36.6–50.3)
HGB BLD-MCNC: 14.1 G/DL (ref 12.1–17)
IMM GRANULOCYTES # BLD AUTO: 0 K/UL (ref 0–0.04)
IMM GRANULOCYTES NFR BLD AUTO: 0 % (ref 0–0.5)
LIPASE SERPL-CCNC: 133 U/L (ref 73–393)
LYMPHOCYTES # BLD: 1.9 K/UL (ref 0.8–3.5)
LYMPHOCYTES NFR BLD: 37 % (ref 12–49)
MCH RBC QN AUTO: 30.5 PG (ref 26–34)
MCHC RBC AUTO-ENTMCNC: 34.6 G/DL (ref 30–36.5)
MCV RBC AUTO: 88.1 FL (ref 80–99)
MONOCYTES # BLD: 0.4 K/UL (ref 0–1)
MONOCYTES NFR BLD: 7 % (ref 5–13)
NEUTS SEG # BLD: 2.7 K/UL (ref 1.8–8)
NEUTS SEG NFR BLD: 52 % (ref 32–75)
NRBC # BLD: 0 K/UL (ref 0–0.01)
NRBC BLD-RTO: 0 PER 100 WBC
PLATELET # BLD AUTO: 229 K/UL (ref 150–400)
PMV BLD AUTO: 9.5 FL (ref 8.9–12.9)
POTASSIUM SERPL-SCNC: 3.9 MMOL/L (ref 3.5–5.1)
PROT SERPL-MCNC: 6.8 G/DL (ref 6.4–8.2)
RBC # BLD AUTO: 4.62 M/UL (ref 4.1–5.7)
SODIUM SERPL-SCNC: 140 MMOL/L (ref 136–145)
WBC # BLD AUTO: 5.3 K/UL (ref 4.1–11.1)

## 2020-05-26 PROCEDURE — 99284 EMERGENCY DEPT VISIT MOD MDM: CPT

## 2020-05-26 PROCEDURE — 83690 ASSAY OF LIPASE: CPT

## 2020-05-26 PROCEDURE — 36415 COLL VENOUS BLD VENIPUNCTURE: CPT

## 2020-05-26 PROCEDURE — 80053 COMPREHEN METABOLIC PANEL: CPT

## 2020-05-26 PROCEDURE — 85025 COMPLETE CBC W/AUTO DIFF WBC: CPT

## 2020-05-26 RX ORDER — FAMOTIDINE 20 MG/1
20 TABLET, FILM COATED ORAL 2 TIMES DAILY
Qty: 20 TAB | Refills: 0 | Status: SHIPPED | OUTPATIENT
Start: 2020-05-26 | End: 2020-06-05

## 2020-05-26 NOTE — ED PROVIDER NOTES
62 y/o male with pmhx of BPH and gastritis presents with complaints of abdominal pain, fullness, and generalized body aches for the past week. Pt states he is being treated for gastritis with omeprazole daily which has helped a little. The body aches are a new symptom for the past week and are without fever, chills, nausea, vomiting, cough, or diarrhea. Denies bloody stool. Last bowel movement was this morning. Past Medical History:   Diagnosis Date    BPH (benign prostatic hyperplasia)        No past surgical history on file.       Family History:   Problem Relation Age of Onset    Parkinsonism Mother     Arthritis-osteo Father     Diabetes Brother     Heart Disease Brother     Heart Surgery Brother        Social History     Socioeconomic History    Marital status: SINGLE     Spouse name: Not on file    Number of children: Not on file    Years of education: Not on file    Highest education level: Not on file   Occupational History    Not on file   Social Needs    Financial resource strain: Not on file    Food insecurity     Worry: Not on file     Inability: Not on file   [employer] needs     Medical: Not on file     Non-medical: Not on file   Tobacco Use    Smoking status: Never Smoker    Smokeless tobacco: Never Used   Substance and Sexual Activity    Alcohol use: No     Alcohol/week: 0.0 standard drinks    Drug use: No    Sexual activity: Yes     Partners: Female   Lifestyle    Physical activity     Days per week: Not on file     Minutes per session: Not on file    Stress: Not on file   Relationships    Social connections     Talks on phone: Not on file     Gets together: Not on file     Attends Jainism service: Not on file     Active member of club or organization: Not on file     Attends meetings of clubs or organizations: Not on file     Relationship status: Not on file    Intimate partner violence     Fear of current or ex partner: Not on file     Emotionally abused: Not on file     Physically abused: Not on file     Forced sexual activity: Not on file   Other Topics Concern    Not on file   Social History Narrative    ** Merged History Encounter **         ** Merged History Encounter **              ALLERGIES: Patient has no known allergies. Review of Systems   Constitutional: Negative for fever. Body aches x 1 week   HENT: Negative for congestion and sore throat. Respiratory: Negative for cough and shortness of breath. Cardiovascular: Negative for chest pain. Gastrointestinal: Positive for abdominal distention and abdominal pain (epigastric). Negative for blood in stool, constipation, diarrhea, nausea and vomiting. Genitourinary: Negative for dysuria. Musculoskeletal: Negative for myalgias. Skin: Negative for rash. Neurological: Negative for dizziness and weakness. Vitals:    05/26/20 1200 05/26/20 1215 05/26/20 1230 05/26/20 1300   BP: 131/65 109/61 129/69 125/78   Pulse:       Resp:       Temp:       SpO2: 98% 100% 100% 100%            Physical Exam  Vitals signs and nursing note reviewed. Constitutional:       General: He is not in acute distress. HENT:      Head: Normocephalic. Nose: Nose normal.      Mouth/Throat:      Mouth: Mucous membranes are moist.   Eyes:      Extraocular Movements: Extraocular movements intact. Neck:      Musculoskeletal: Normal range of motion. Pulmonary:      Effort: Pulmonary effort is normal. No respiratory distress. Abdominal:      General: Abdomen is flat. There is no distension. Palpations: Abdomen is soft. Tenderness: There is no abdominal tenderness. There is no right CVA tenderness, left CVA tenderness, guarding or rebound. Skin:     General: Skin is dry. Findings: No rash. Neurological:      Mental Status: He is alert and oriented to person, place, and time.    Psychiatric:         Mood and Affect: Mood normal.        Medications - No data to display  Labs Reviewed   CBC WITH AUTOMATED DIFF - Abnormal; Notable for the following components:       Result Value    RDW 14.6 (*)     All other components within normal limits   METABOLIC PANEL, COMPREHENSIVE - Abnormal; Notable for the following components:    Glucose 123 (*)     BUN/Creatinine ratio 11 (*)     All other components within normal limits   LIPASE     No orders to display       MDM  Number of Diagnoses or Management Options  Abdominal fullness:   Abdominal pain, epigastric:   Generalized body aches:      Amount and/or Complexity of Data Reviewed  Clinical lab tests: reviewed          60 y/o male with pmhx of BPH and gastritis presents with abdominal pain and generalized body aches. Abdominal exam is completely benign. Pt afebrile with stable vitals. Differential includes pancreatitis, gastritis. All labs unremarkable. Lipase normal. Likely continued symptoms from gastritis. Will prescribe pepcid to be taken in addition to omeprazole. Pt has PCP and GI follow-up. Discussed indications for return to ED such as inability to tolerate PO, weakness, fever, bloody or black stool, increased abdominal pain. Case reviewed with attending physician, Dr. Amna Rangel.       Procedures    Kiki Adams PA-C  5/26/2020

## 2020-05-26 NOTE — ED TRIAGE NOTES
in use. Patient has been treated for gastritis for 15 weeks per patient and now having body aches all over body. Mild abdominal pain with a lot if fullness. Denies NVD.

## 2020-05-26 NOTE — DISCHARGE INSTRUCTIONS
Patient Education        Dolor abdominal: Instrucciones de cuidado  Abdominal Pain: Care Instructions  Instrucciones de cuidado    El dolor abdominal tiene muchas causas posibles. Algunas de ellas no son graves y mejoran por sí solas en unos días. Otras requieren [place] y [place]. Si nixon dolor continúa o [place], necesitará gavin nueva revisión y [place] pruebas para determinar qué pasa. Es posible que necesite cirugía para corregir el problema. No ignore nuevos síntomas, katrin fiebre, náuseas y [place], [facility], dolor que [place] o [place]ana. Podrían ser señales de un problema más grave. Nixon médico puede haberle recomendado gavin consulta de Dashawn & Dandre las 8 o 12 horas siguientes. Si no se siente mejor, es posible que requiera [place] o [place]. El médico lo bravo revisado minuciosamente, oksana puede astrid problemas más tarde. Si nota algún problema o síntomas nuevos, busque tratamiento médico inmediatamente. La atención de seguimiento es gavin parte clave de nixon tratamiento y seguridad. Asegúrese de hacer y acudir a todas las citas, y llame a nixon médico si está teniendo problemas. También es gavin buena idea saber los resultados de amena exámenes y mantener gavin lista de los medicamentos que alesia. ¿Cómo puede cuidarse en el hogar? · Descanse hasta que se sienta mejor. · Para prevenir la deshidratación, royce abundantes líquidos, suficientes para que nixon orina sea de color amarillo alycia o transparente katrin el agua. Elija beber agua y otros líquidos shayne sin cafeína hasta que se sienta mejor. Si tiene [employer] [facility] [employer], del corazón o del hígado y tiene que [place] líquidos, hable con nixon médico antes de aumentar nixon consumo. · Si tiene [employer], coma alimentos suaves, katrin arroz, pan mya seco o galletas saladas, bananas (plátanos) y puré de [place]. Trate de comer varias comidas pequeñas al día en lugar de dos o lynette grandes.   · Espere hasta 48 horas después de que todos los síntomas hayan desaparecido antes de comer alimentos condimentados, alcohol y bebidas que contengan cafeína. · No consuma alimentos ricos en grasa. · Evite medicamentos antiinflamatorios katrin aspirina, ibuprofeno (Advil, Motrin) y naproxeno (Aleve). Pueden causar [employer]. Dígale a nixon médico si está tomando aspirina diariamente debido a otro problema de amy. ¿Cuándo debe pedir ayuda? Llame al 911 en cualquier momento que considere que necesita atención de emergencia. Por ejemplo, llame si:    · Se desmayó (perdió el conocimiento).   · Las heces son de color rojizo o muy sanguinolentas (con chavez).   · Vomita chavez o algo parecido a granos de café molido.     · Tiene dolor abdominal nuevo e intenso.    Llame a nixon médico ahora mismo o busque atención médica inmediata si:    · Nixon dolor empeora, sobre todo si se concentra en gavin candelario parte del vientre.     · Vuelve a tener fiebre o tiene fiebre más mino.     · Mariya heces son negruzcas y parecidas al alquitrán o tienen rastros de chavez.     · Tiene sangrado vaginal inesperado.     · Tiene síntomas de gavin infección del tracto urinario. Estos podrían incluir:  ? Dolor al [place]. ? Orinar con más frecuencia que lo habitual.  ? Chavez en la [facility].     · Siente mareos o aturdimiento, o que está a punto de desmayarse.    Preste especial atención a los cambios en nixon amy y asegúrese de comunicarse con nixon médico si:    · No está mejorando después de 1 día (24 horas). ¿Dónde puede encontrar más información en inglés? Brandon stapleton [email or web address]  Scott Lux S126 en la búsqueda para aprender más acerca de \"Dolor abdominal: Instrucciones de cuidado. \"  Revisado: 26 junio, 2019Versión del contenido: 12.4  © 8465-3689 Healthwise, [employer]. Las instrucciones de cuidado fueron adaptadas bajo licencia por [employer] (which disclaims liability or warranty for this information).  Si usted tiene [place] afección médica o sobre estas instrucciones, siempre pregunte a nixon profesional de amy. [facility] niega toda garantía o responsabilidad por nixon uso de esta información.

## 2020-05-27 ENCOUNTER — PATIENT OUTREACH (OUTPATIENT)
Dept: FAMILY MEDICINE CLINIC | Age: 60
End: 2020-05-27

## 2020-06-02 ENCOUNTER — OFFICE VISIT (OUTPATIENT)
Dept: FAMILY MEDICINE CLINIC | Age: 60
End: 2020-06-02

## 2020-06-02 DIAGNOSIS — G56.00 CARPAL TUNNEL SYNDROME, UNSPECIFIED LATERALITY: ICD-10-CM

## 2020-06-02 DIAGNOSIS — R10.9 ABDOMINAL PAIN, UNSPECIFIED ABDOMINAL LOCATION: Primary | ICD-10-CM

## 2020-06-02 RX ORDER — IBUPROFEN 200 MG
200 TABLET ORAL 2 TIMES DAILY
COMMUNITY
End: 2020-06-02

## 2020-06-02 RX ORDER — PANTOPRAZOLE SODIUM 20 MG/1
20 TABLET, DELAYED RELEASE ORAL DAILY
Qty: 60 TAB | Refills: 1 | Status: SHIPPED | OUTPATIENT
Start: 2020-06-02 | End: 2020-07-20

## 2020-06-02 NOTE — PATIENT INSTRUCTIONS
Síndrome del debi carpiano: Instrucciones de cuidado  Carpal Tunnel Syndrome: Care Instructions  Instrucciones de cuidado    El síndrome del debi carpgoyo es un problema nervioso. Puede causar hormigueo, entumecimiento, debilidad o [employer] dedos, el pulgar y la [place]. El nervio mediano y varios tejidos fibrosos, llamados tendones, atraviesan la shereen por un espacio denominado debi carpiano. El movimiento repetido de las carmela al trabajar o practicar ciertos pasatiempos y deportes puede hacer presión sobre el nervio. El embarazo y ciertas afecciones médicas, katrin la diabetes, la artritis y Health system tiroides hipoactiva, también pueden causar el síndrome del debi carpgoyo. Para reducir los síntomas, usted podría limitar [facility], [place] o [facility]. También puede dhiraj otras medidas para sentirse mejor. Si los síntomas son leves, es probable que pueda aliviar el dolor con 1 o 2 semanas de tratamiento en el hogar. La cirugía es necesaria solo si otros tratamientos no funcionan. La atención de seguimiento es gavin parte clave de nixon tratamiento y seguridad. Asegúrese de hacer y acudir a todas las citas, y llame a nixon médico si está teniendo problemas. También es gavin buena idea saber los resultados de amena exámenes y mantener gavin lista de los medicamentos que alesia. ¿Cómo puede cuidarse en el hogar? · Si es posible, interrumpa o disminuya la actividad que causa los síntomas. Si no puede suspenderla, edelmira pausas frecuentes para descansar y estirarse o cambie la posición de las carmela para hacer gavin tarea. Trate de [place] [facility], katrin cuando [place] el ratón de gavin computadora. · Trate de evitar doblar o rotar las muñecas. · Pregúntele a nixon médico si puede dhiraj un analgésico (medicamento para el dolor) de venta prashanth, katrin acetaminofén (Tylenol), ibuprofeno (Advil, Motrin) o naproxeno (Aleve). Sea ronel con los medicamentos. Roma y siga todas las instrucciones de la [place].   · Si nixon médico le receta corticosteroides para ayudar a aliviar el dolor y reducir la hinchazón, tómelos exactamente katrin le fueron recetados. Llame a nixon médico si richard estar teniendo problemas con nixon medicamento. · Colóquese hielo o gavin compresa fría sobre la [place] de 10 a 20 minutos cada vez para aliviar el dolor. Póngase un paño alberts entre el hielo y la piel. · Si nixon médico o nixon fisioterapeuta o terapeuta ocupacional le indica que use gavin tablilla (férula) para la [place], [place] según las indicaciones para mantener la [place] en gavin posición neutra. [place] también reduce la presión sobre nixon nervio mediano. · Pregúntele a nixon médico si debería hacer sesiones de fisioterapia o de terapia ocupacional para aprender a hacer las tareas de [employer]. · [place] clases de yoga para estirar los músculos y fortalecer las carmela y las [place]. El yoga ha [employer] síntomas del túnel carpiano. Cómo prevenir el síndrome del túnel carpiano  · Cuando trabaje en la computadora, [place] [address] y las muñecas alineadas con los antebrazos. Mantenga los codos cerca de amena costados. [place] un descanso con gavin frecuencia de 10 a 15 minutos. · Trate de hacer los siguientes ejercicios:  ? Calentamiento: Gire la [place], Baltazar Katerina y de un lado a otro. Repita esto 4 veces. Estire [employer] dedos, relájelos y vuelva a estirarlos. Repita 4 veces. Estire el pulgar doblándolo levemente hacia atrás, manténgalo en torin posición y relájelo. Repita 4 veces. ? Estiramiento con los [employer] en posición de orar: Comience colocando las rambo de las carmela juntas abby del [place], radha debajo del [place]. Baje las carmela lentamente hacia la línea de la cintura y manténgalas cerca del estómago con las rambo juntas hasta que sienta un estiramiento leve a moderado debajo de los antebrazos. Mantenga la posición entre 10 y 21 segundos. Repita 4 veces. ? Estiramiento del flexor de la shereen: Extienda el Roberta Malachi frente a usted, con la tineo Kenji lowry. Doble la shereen y apunte con la mano hacia el piso. Con la [employer], doble con suavidad la shereen aún más hasta que sienta un estiramiento entre leve y moderado en el antebrazo. Mantenga la posición entre 10 y 21 segundos. Repita 4 veces. ? Estiramiento del extensor de la shereen: Repita los pasos para el estiramiento del flexor de la shereen oksana comience con la tineo extendida Kchristopher K. · [employer]priete gavin pelota de goma varias veces al día para mantener asia las carmela y los dedos. · Evite sostener objetos (katrin un libro) en la misma posición lacy mucho tiempo. Siempre que sea posible, utilice toda la mano para dhiraj un objeto. Si [place] solo el pulgar y el dedo índice puede tensionar la [place]. · No fume. Puede empeorar esta afección ya que reduce el flujo de chavez hacia el nervio El paso. Si necesita ayuda para dejar de fumar, hable con nixon médico sobre programas y medicamentos para dejar de fumar. Estos pueden aumentar amena probabilidades de dejar el hábito para siempre. ¿Cuándo debe pedir ayuda? Preste especial atención a los cambios de nixon amy y asegúrese de comunicarse con nixon médico si:  · El dolor u otros problemas no mejoran con los cuidados en el hogar. · Desea más información sobre la fisioterapia o la terapia ocupacional.  · Tiene efectos secundarios producidos por los corticosteroides, tales katrin:  ? Aumento de [place]. ? Cambios en el estado de ánimo. ? Problemas para dormir. ? Fácil formación de moretones. · Tiene otros problemas con los medicamentos. ¿Dónde puede encontrar más información en inglés? Vaya a [email or web address]  Kieran Duron R432 en la búsqueda para aprender más acerca de \"Síndrome del debi yap: Instrucciones de cuidado. \"  Revisado: 2 marzo, 2020               Versión del contenido: 12.5  © 1906-2787 Healthwise, Incorporated.    Las instrucciones de cuidado fueron adaptadas bajo licencia por [employer] (which disclaims liability or warranty for this information). Si usted tiene Rhea Loa afección médica o sobre estas instrucciones, siempre pregunte a nixon profesional de amy. [facility], [facility]orporated niega toda garantía o responsabilidad por nixon uso de esta información.

## 2020-06-02 NOTE — PROGRESS NOTES
Coordination of Care  1. Have you been to the ER, urgent care clinic since your last visit? Hospitalized since your last visit? Yes, went to the [facility] ED on 2020 due to body pain and abdominal fullness. 2. Have you seen or consulted any other health care providers outside of the [address] [place] [address] since your last visit? Include any pap smears or colon screening. No    Does the patient need refills? YES    Learning Assessment Complete? yes  Depression Screening complete in the past 12 months? yes     3:34 PM  Check-out Note: Please describe the carpal tunnel wrist splints to buy to wear at night and please mail the [place] information under patient instructions.  Reminder to stop advil.  I have ordered H Pylori stool - I told patient we may not be able to do this but we will if we can.  I also mentioned his GI doctor in July would take care of this.  I reminded him to call today the Urologist at [facility] to get that appointment. Discharge nurse encounter completed via telephoned call. Name and  verified. AVS, prescription and pharmacy location reviewed . [employer] coupon code sent via text per patient's request.  -carpal tunnel wrist splints to buy to wear at night described and discussed that he can buy OTC at his local pharmacy  -message sent to nurse with printing capability to send AVS to patient's address on file (confirmed) with information about wrist splint and carpal tunnel syndrome  -Message has been sent to registration to give appt. To patient for next week for h-pilory supplies , per Angelo Gaytan NP  -Reminded patient and number provided for [facility] for patient to call for urologist appt. This has been fully explained to the patient, who indicates understanding and agrees with plan. No further questions at this time.  Jose Cook RN

## 2020-06-02 NOTE — PROGRESS NOTES
Beba Mckeon is a 61 y.o. male evaluated via telephone at [phone] on 6/2/2020. Patient identification verified with 2 identifiers. Consent: He and/or health care decision maker has provided verbal consent to proceed: Yes Pursuant to the emergency declaration under the [facility], [address] [place] [address] and the [employer] Act, this Virtual Telephone Visit was conducted to reduce the patient's risk of exposure to COVID-19. : Felecia Valladares  Chief Complaint   Patient presents with   [address] [place] [address] ED Follow-up     for muscle pain/\"bone pain\" and abdominal fullness- was recommended in the ED for stool test. patient will be seen GI through [facility] at the end of July. Pain in fingers of left hand and happens when laying on them; takes neurobion for this. (B complex vitamins, no NSAID)  Takes omeprazole 1 po bid;   HPI Referral to Dr. Maria Antonia Blunt, 05-: Patient to call the [facility] appointment line, [phone], on/after 05-, to discuss the referral (he has done this). For urology, to call same number ([phone]) on/after 5/26/2020, I.e. TODAY. Documentation:  I communicated with the patient and/or health care decision maker about:  Diagnoses and all orders for this visit:    1. Abdominal pain, unspecified abdominal location  -     H PYLORI AG, STOOL; Future  -     pantoprazole (PROTONIX) 20 mg tablet; Take 1 Tab by mouth daily. 2. Carpal tunnel syndrome, unspecified laterality      States he is losing weight. Details of this discussion including any medical advice provided: For urology, to call [facility] appointment line, ([phone]) on/after 5/26/2020, I.e. TODAY. Total Time: minutes: 21-30 minutes  I affirm this is a Patient Initiated Episode with a Patient who has not had a related appointment within my department in the past 7 days or scheduled within the next 24 hours.   Sallie Dupont NP Fred Maxwell expressed understanding and agreed to this plan.    I will order stool testing for H Pylori

## 2020-06-09 ENCOUNTER — HOSPITAL ENCOUNTER (OUTPATIENT)
Dept: LAB | Age: 60
Discharge: HOME | End: 2020-06-09

## 2020-06-09 ENCOUNTER — LAB ONLY (OUTPATIENT)
Dept: FAMILY MEDICINE CLINIC | Age: 60
End: 2020-06-09

## 2020-06-09 DIAGNOSIS — R10.9 ABDOMINAL PAIN, UNSPECIFIED ABDOMINAL LOCATION: ICD-10-CM

## 2020-06-09 PROCEDURE — 87338 HPYLORI STOOL AG IA: CPT

## 2020-06-09 NOTE — PROGRESS NOTES
Patient was here to drop off stool specimen for Hpylori testing as per Aria Lebron. Container labeled w/ time, date and name of the patient.

## 2020-06-12 LAB
H PYLORI AG STL QL IA: NEGATIVE
SPECIMEN SOURCE: NORMAL

## 2020-06-23 ENCOUNTER — OFFICE VISIT (OUTPATIENT)
Dept: FAMILY MEDICINE CLINIC | Age: 60
End: 2020-06-23

## 2020-06-23 DIAGNOSIS — Z71.89 COUNSELING AND COORDINATION OF CARE: Primary | ICD-10-CM

## 2020-06-23 NOTE — PROGRESS NOTES
MAHOGANY. assisted patient with CC for bills. Patient needs assistance explaining his financial situation in [facility]. Forms were mailed out to him today for him to sign and send back for me to complete and mail to [facility] financial assistance.

## 2020-07-06 ENCOUNTER — OFFICE VISIT (OUTPATIENT)
Dept: FAMILY MEDICINE CLINIC | Age: 60
End: 2020-07-06

## 2020-07-06 DIAGNOSIS — Z71.89 COUNSELING AND COORDINATION OF CARE: Primary | ICD-10-CM

## 2020-07-15 ENCOUNTER — ANESTHESIA (OUTPATIENT)
Dept: ENDOSCOPY | Age: 60
End: 2020-07-15
Payer: SUBSIDIZED

## 2020-07-15 ENCOUNTER — HOSPITAL ENCOUNTER (OUTPATIENT)
Age: 60
Setting detail: OUTPATIENT SURGERY
Discharge: HOME | End: 2020-07-15
Attending: INTERNAL MEDICINE | Admitting: INTERNAL MEDICINE
Payer: SUBSIDIZED

## 2020-07-15 ENCOUNTER — ANESTHESIA EVENT (OUTPATIENT)
Dept: ENDOSCOPY | Age: 60
End: 2020-07-15
Payer: SUBSIDIZED

## 2020-07-15 VITALS
TEMPERATURE: 98.1 F | OXYGEN SATURATION: 100 % | RESPIRATION RATE: 14 BRPM | HEART RATE: 48 BPM | SYSTOLIC BLOOD PRESSURE: 125 MMHG | HEIGHT: 70 IN | DIASTOLIC BLOOD PRESSURE: 74 MMHG | BODY MASS INDEX: 19.18 KG/M2 | WEIGHT: 134 LBS

## 2020-07-15 PROCEDURE — 74011000250 HC RX REV CODE- 250: Performed by: NURSE ANESTHETIST, CERTIFIED REGISTERED

## 2020-07-15 PROCEDURE — 88305 TISSUE EXAM BY PATHOLOGIST: CPT

## 2020-07-15 PROCEDURE — 77030019988 HC FCPS ENDOSC DISP BSC -B: Performed by: INTERNAL MEDICINE

## 2020-07-15 PROCEDURE — 88342 IMHCHEM/IMCYTCHM 1ST ANTB: CPT

## 2020-07-15 PROCEDURE — 76060000031 HC ANESTHESIA FIRST 0.5 HR: Performed by: INTERNAL MEDICINE

## 2020-07-15 PROCEDURE — 76040000019: Performed by: INTERNAL MEDICINE

## 2020-07-15 PROCEDURE — 74011250636 HC RX REV CODE- 250/636: Performed by: NURSE ANESTHETIST, CERTIFIED REGISTERED

## 2020-07-15 PROCEDURE — 74011250636 HC RX REV CODE- 250/636: Performed by: INTERNAL MEDICINE

## 2020-07-15 RX ORDER — PROPOFOL 10 MG/ML
INJECTION, EMULSION INTRAVENOUS AS NEEDED
Status: DISCONTINUED | OUTPATIENT
Start: 2020-07-15 | End: 2020-07-15 | Stop reason: HOSPADM

## 2020-07-15 RX ORDER — FLUMAZENIL 0.1 MG/ML
0.2 INJECTION INTRAVENOUS
Status: DISCONTINUED | OUTPATIENT
Start: 2020-07-15 | End: 2020-07-15 | Stop reason: HOSPADM

## 2020-07-15 RX ORDER — SODIUM CHLORIDE 9 MG/ML
100 INJECTION, SOLUTION INTRAVENOUS CONTINUOUS
Status: DISCONTINUED | OUTPATIENT
Start: 2020-07-15 | End: 2020-07-15 | Stop reason: HOSPADM

## 2020-07-15 RX ORDER — SODIUM CHLORIDE 0.9 % (FLUSH) 0.9 %
5-40 SYRINGE (ML) INJECTION AS NEEDED
Status: DISCONTINUED | OUTPATIENT
Start: 2020-07-15 | End: 2020-07-15 | Stop reason: HOSPADM

## 2020-07-15 RX ORDER — NALOXONE HYDROCHLORIDE 0.4 MG/ML
0.4 INJECTION, SOLUTION INTRAMUSCULAR; INTRAVENOUS; SUBCUTANEOUS
Status: DISCONTINUED | OUTPATIENT
Start: 2020-07-15 | End: 2020-07-15 | Stop reason: HOSPADM

## 2020-07-15 RX ORDER — LIDOCAINE HYDROCHLORIDE 20 MG/ML
INJECTION, SOLUTION EPIDURAL; INFILTRATION; INTRACAUDAL; PERINEURAL AS NEEDED
Status: DISCONTINUED | OUTPATIENT
Start: 2020-07-15 | End: 2020-07-15 | Stop reason: HOSPADM

## 2020-07-15 RX ORDER — ATROPINE SULFATE 0.1 MG/ML
0.5 INJECTION INTRAVENOUS
Status: DISCONTINUED | OUTPATIENT
Start: 2020-07-15 | End: 2020-07-15 | Stop reason: HOSPADM

## 2020-07-15 RX ORDER — EPINEPHRINE 0.1 MG/ML
1 INJECTION INTRACARDIAC; INTRAVENOUS
Status: DISCONTINUED | OUTPATIENT
Start: 2020-07-15 | End: 2020-07-15 | Stop reason: HOSPADM

## 2020-07-15 RX ORDER — SODIUM CHLORIDE 0.9 % (FLUSH) 0.9 %
5-40 SYRINGE (ML) INJECTION EVERY 8 HOURS
Status: DISCONTINUED | OUTPATIENT
Start: 2020-07-15 | End: 2020-07-15 | Stop reason: HOSPADM

## 2020-07-15 RX ORDER — DEXTROMETHORPHAN/PSEUDOEPHED 2.5-7.5/.8
1.2 DROPS ORAL
Status: DISCONTINUED | OUTPATIENT
Start: 2020-07-15 | End: 2020-07-15 | Stop reason: HOSPADM

## 2020-07-15 RX ADMIN — SODIUM CHLORIDE: 900 INJECTION, SOLUTION INTRAVENOUS at 10:10

## 2020-07-15 RX ADMIN — LIDOCAINE HYDROCHLORIDE 100 MG: 20 INJECTION, SOLUTION EPIDURAL; INFILTRATION; INTRACAUDAL; PERINEURAL at 10:18

## 2020-07-15 RX ADMIN — PROPOFOL 50 MG: 10 INJECTION, EMULSION INTRAVENOUS at 10:20

## 2020-07-15 RX ADMIN — PROPOFOL 20 MG: 10 INJECTION, EMULSION INTRAVENOUS at 10:26

## 2020-07-15 RX ADMIN — PROPOFOL 100 MG: 10 INJECTION, EMULSION INTRAVENOUS at 10:18

## 2020-07-15 RX ADMIN — SODIUM CHLORIDE 100 ML/HR: 900 INJECTION, SOLUTION INTRAVENOUS at 09:53

## 2020-07-15 RX ADMIN — PROPOFOL 30 MG: 10 INJECTION, EMULSION INTRAVENOUS at 10:23

## 2020-07-15 NOTE — PROGRESS NOTES
Endoscope was pre-cleaned at the bedside immediately following procedure by Recio    Anesthesia reports 200mg Propofol, 100mg Lidocaine and 200mL NS given during procedure. Received report from anesthesia staff on vital signs and status of patient.

## 2020-07-15 NOTE — ANESTHESIA POSTPROCEDURE EVALUATION
Procedure(s):  ESOPHAGOGASTRODUODENOSCOPY (EGD)  ESOPHAGOGASTRODUODENAL (EGD) BIOPSY.     total IV anesthesia    Anesthesia Post Evaluation      Multimodal analgesia: multimodal analgesia used between 6 hours prior to anesthesia start to PACU discharge  Patient location during evaluation: bedside  Patient participation: complete - patient participated  Level of consciousness: awake  Pain management: adequate  Airway patency: patent  Anesthetic complications: no  Cardiovascular status: acceptable  Respiratory status: acceptable  Hydration status: acceptable  Post anesthesia nausea and vomiting:  controlled  Final Post Anesthesia Temperature Assessment:  Normothermia (36.0-37.5 degrees C)      INITIAL Post-op Vital signs:   Vitals Value Taken Time   BP 96/67 7/15/2020 10:29 AM   Temp     Pulse 60 7/15/2020 10:29 AM   Resp 19 7/15/2020 10:29 AM   SpO2 99 % 7/15/2020 10:29 AM

## 2020-07-15 NOTE — PROCEDURES
[facility]                   Endoscopic Gastroduodenoscopy Procedure Note      7/15/2020  Annita MARCUS [facility]  1960  [id number]    Procedure: Endoscopic Gastroduodenoscopy with biopsy    Indication:  Abdominal pain, epigastric, Dyspepsia-acid, weight loss     Pre-operative Diagnosis: see indication above    Post-operative Diagnosis: see findings below    : P. Ada Goltz. MD    Referring Provider:  Zahida Velasco NP      Anesthesia/Sedation:  MAC anesthesia Propofol    Airway assessment: No airway problems anticipated    Pre-Procedural Exam:      Airway: clear, no airway problems anticipated  Heart: RRR, without gallops or rubs  Lungs: clear bilaterally without wheezes, crackles, or rhonchi  Abdomen: soft, nontender, nondistended, bowel sounds present  Mental Status: awake, alert and oriented to person, place and time       Procedure Details     After infomed consent was obtained for the procedure, with all risks and benefits of procedure explained the patient was taken to the endoscopy suite and placed in the left lateral decubitus position. Following sequential administration of sedation as per above, the endoscope was inserted into the mouth and advanced under direct vision to second portion of the duodenum. A careful inspection was made as the gastroscope was withdrawn, including a retroflexed view of the proximal stomach; findings and interventions are described below. Findings:   Esophagus:   Mild esophagitis at the GE junction, biopsied  Stomach: mild patchy erythema of antrum, biopsied  Duodenum: normal, biopsied    Therapies:  biopsy of esophagus  biopsy of stomach antrum  biopsy of duodenal second portion    Specimens: 1. Duodenal biopsies  2 gastric antrum biopsies  3 GE junction biopsies           Complications:   None; patient tolerated the procedure well. EBL:  None.             Impression:      Mild esophagitis  Mild gastritis  Normal duodenum    Recommendations:  -Acid suppression with a proton pump inhibitor. , -Await pathology. , -Gastric emptying scan     Ponce Rivero MD7/15/2020

## 2020-07-15 NOTE — ANESTHESIA PREPROCEDURE EVALUATION
Relevant Problems   No relevant active problems       Anesthetic History   No history of anesthetic complications            Review of Systems / Medical History  Patient summary reviewed, nursing notes reviewed and pertinent labs reviewed    Pulmonary  Within defined limits            Pertinent negatives: No smoker     Neuro/Psych   Within defined limits        Pertinent negatives: No CVA   Cardiovascular                Pertinent negatives: No past MI, CAD, dysrhythmias, CHF, CABG and hyperlipidemia  Exercise tolerance: >4 METS  Comments: Stress Test 2019: Overall, the patient's exercise capacity was good. · The patient reported no angina during stress  · There was no ST segment deviation noted during stress. Normal stress ECG. · Low risk Duke treadmill score. · Stress test results correlate with a low risk of inducible myocardial ischemia.     EKG 2020: NSR   GI/Hepatic/Renal             Pertinent negatives: No liver disease and renal disease   Endo/Other          Pertinent negatives: No diabetes, hypothyroidism, obesity and arthritis   Other Findings            Physical Exam    Airway  Mallampati: I  TM Distance: 4 - 6 cm  Neck ROM: normal range of motion        Cardiovascular  Regular rate and rhythm,  S1 and S2 normal,  no murmur, click, rub, or gallop  Rhythm: regular  Rate: normal         Dental    Dentition: Upper partial plate  Comments: Dentures removed   Pulmonary  Breath sounds clear to auscultation               Abdominal  GI exam deferred       Other Findings            Anesthetic Plan    ASA: 2  Anesthesia type: total IV anesthesia          Induction: Intravenous  Anesthetic plan and risks discussed with: Patient

## 2020-07-15 NOTE — PERIOP NOTES
Denver Crigldwaine  1960  [id number]    Situation:  Verbal report received from: ELLEN Sanon  Procedure: Procedure(s):  ESOPHAGOGASTRODUODENOSCOPY (EGD)  ESOPHAGOGASTRODUODENAL (EGD) BIOPSY    Background:    Preoperative diagnosis: UPPER ABDOMINAL PAIN/SCREENING  Postoperative diagnosis: esophagitis, gastritis    :  Dr. Noa Munoz  Assistant(s): Endoscopy Technician-1: Michael Julio  Endoscopy RN-1: Zora Stapleton RN    Specimens:   ID Type Source Tests Collected by Time Destination   1 : Bx Preservative Duodenum  Rashida Fofana MD 7/15/2020 1020 Pathology   2 : Bx Preservative Stomach, Antrum  Rashida Fofana MD 7/15/2020 1021 Pathology   3 : Bx Preservative GE Yves Ledbetter MD 7/15/2020 1024 Pathology     H. Pylori  no    Assessment:  Intra-procedure medications     Anesthesia gave intra-procedure sedation and medications, see anesthesia flow sheet yes    Intravenous fluids: NS@ KVO     Vital signs stable     Abdominal assessment: round and soft     Recommendation:  Discharge patient per MD order.   Family or Friend   Permission to share finding with family or friend yes

## 2020-07-15 NOTE — DISCHARGE INSTRUCTIONS
Melina Batres MD  [facility], [address]  [place], [address]  [phone]  [email or web address]    Ej Anguiano  [id number]  1960    EGD DISCHARGE INSTRUCTIONS    Discomfort:  Sore throat- throat lozenges or warm salt water gargle  redness at IV site- apply warm compress to area; if redness or soreness persist- contact your physician  Gaseous discomfort- walking, belching will help relieve any discomfort  You may not operate a vehicle for 12 hours  You may not engage in an occupation involving machinery or appliances for rest of today  You may not drink alcoholic beverages for at least 12 hours  Avoid making any critical decisions for at least 24 hour  DIET  You may have anything by mouth. You may eat and drink immediately. You may resume your regular diet - however -  remember your colon is empty and a heavy meal will produce gas. Avoid these foods:  vegetables, fried / greasy foods, carbonated drinks      ACTIVITY  You may resume your normal daily activities   Spend the remainder of the day resting -  avoid any strenuous activity. CALL M.D. ANY SIGN OF   Increasing pain, nausea, vomiting  Abdominal distension (swelling)  New increased bleeding (oral or rectal)  Fever (chills)  Pain in chest area  Bloody discharge from nose or mouth  Shortness of breath    Follow-up Instructions:   Call Dr. Melina Batres for any questions or problems. Telephone # [phone]  Dr. Ron Resendez office will notify you of the biopsy results available  Within 7 to 10 days. We will call you or send a letter   Continue same medications. Dr. Ron Resendez office will schedule you for a gastric emptying scan    ENDOSCOPY FINDINGS:   Your endoscopy showed only some mild inflammation of the esophagus and the stomach which was biopsied.       DISCHARGE SUMMARY from Nurse    The following personal items collected during your admission are returned to you:   Dental Appliance: Dental Appliances: None  Vision: Visual Aid: None  Hearing Aid:    Jewelry:    Clothing:    Other Valuables:    Valuables sent to safe:

## 2020-07-15 NOTE — H&P
Gauri Jasso MD  [facility], [address], [address]  [phone]  [email or web address]      Gastroenterology Outpatient History and Physical    Patient: Rena Gómez    Physician: Elizabeth Shaffer MD    Vital Signs: Blood pressure 137/88, pulse 78, temperature 98.2 °F (36.8 °C), resp. rate 14, height 5' 10\" (1.778 m), weight 60.8 kg (134 lb), SpO2 100 %. Allergies: No Known Allergies    Chief Complaint: abd pain    History of Present Illness: Here for EGD to evaluate upper abdominal pain, early satiety, dyspepsia. See OV note for more details. History:  Past Medical History:   Diagnosis Date    BPH (benign prostatic hyperplasia)     History reviewed. No pertinent surgical history. Social History     Socioeconomic History    Marital status: SINGLE     Spouse name: Not on file    Number of children: Not on file    Years of education: Not on file    Highest education level: Not on file   Tobacco Use    Smoking status: Never Smoker    Smokeless tobacco: Never Used   Substance and Sexual Activity    Alcohol use: No     Alcohol/week: 0.0 standard drinks    Drug use: No    Sexual activity: Yes     Partners: Female   Social History Narrative    ** Merged History Encounter **         ** Merged History Encounter **           Family History   Problem Relation Age of Onset    Parkinsonism Mother     Arthritis-osteo Father     Diabetes Brother     Heart Disease Brother     Heart Surgery Brother       Patient Active Problem List   Diagnosis Code    BPH (benign prostatic hyperplasia) N40.0    H/O colonoscopy Z98.890         Medications:   Prior to Admission medications    Medication Sig Start Date End Date Taking? Authorizing Provider   pantoprazole (PROTONIX) 20 mg tablet Take 1 Tab by mouth daily.  6/2/20   Douglas Carrasco NP   ondansetron (ZOFRAN ODT) 4 mg disintegrating tablet Take 1 Tab by mouth every eight (8) hours as needed for Nausea or Vomiting. 2/18/20   Kary BAIRD, MIR       Physical Exam:     General: well developed, well nourished   HEENT: unremarkable   Heart: regular rhythm no mumur    Lungs: clear   Abdominal:  benign   Neurological: unremarkable   Extremities: no edema     Findings/Diagnosis: Upper abdominal pain, weight loss, early satiety, dyspepsia    Plan of Care/Planned Procedure: EGD with  monitored anesthesia care sedation       Signed:  Ananth Grissom MD 7/15/2020

## 2020-07-20 ENCOUNTER — OFFICE VISIT (OUTPATIENT)
Dept: FAMILY MEDICINE CLINIC | Age: 60
End: 2020-07-20

## 2020-07-20 DIAGNOSIS — R14.0 BLOATED ABDOMEN: Primary | ICD-10-CM

## 2020-07-20 DIAGNOSIS — K21.9 GASTROESOPHAGEAL REFLUX DISEASE, ESOPHAGITIS PRESENCE NOT SPECIFIED: ICD-10-CM

## 2020-07-20 DIAGNOSIS — Z71.89 COUNSELING AND COORDINATION OF CARE: Primary | ICD-10-CM

## 2020-07-20 RX ORDER — PANTOPRAZOLE SODIUM 40 MG/1
40 TABLET, DELAYED RELEASE ORAL DAILY
Qty: 90 TAB | Refills: 2 | Status: SHIPPED | OUTPATIENT
Start: 2020-07-20 | End: 2021-06-17

## 2020-07-20 RX ORDER — DICYCLOMINE HYDROCHLORIDE 20 MG/1
20 TABLET ORAL
Qty: 90 TAB | Refills: 5 | Status: SHIPPED | OUTPATIENT
Start: 2020-07-20 | End: 2021-06-17

## 2020-07-20 NOTE — PROGRESS NOTES
V. V. Regarding bills and letters patient is getting in the mail. Patient has requested a F2F visit. Patient has been given a F2F appointment for 7/23/2020.

## 2020-07-20 NOTE — PROGRESS NOTES
HISTORY OF PRESENT ILLNESS  Yahaira Rogers is a 61 y.o. male. HPI  I was in the office while conducting this encounter. Consent:  He and/or his healthcare decision maker is aware that this patient-initiated Telehealth encounter is a billable service, with coverage as determined by his insurance carrier. He is aware that he may receive a bill and has provided verbal consent to proceed: Yes    This virtual visit was conducted telephone encounter only. -  I affirm this is a Patient Initiated Episode with an Established Patient who has not had a related appointment within my department in the past 7 days or scheduled within the next 24 hours. Note: this encounter is not billable if this call serves to triage the patient into an appointment for the relevant concern. Total Time: minutes: 11-20 minutes. Patient states he went to have the EGD last week. He had called to see if there is any results back. Patient states he feels he can't eat because he feels bloated and feels this happens when he eats. He has been ingesting aloe vera and carrot juice and this is a little better. Patient states he is not taking any medications on hand. ROS    Physical Exam    ASSESSMENT and PLAN  Diagnoses and all orders for this visit:    1. Bloated abdomen  -     dicyclomine (BENTYL) 20 mg tablet; Take 1 Tab by mouth three (3) times daily (with meals). 2. Gastroesophageal reflux disease, esophagitis presence not specified  -     pantoprazole (PROTONIX) 40 mg tablet; Take 1 Tab by mouth daily.       Dietary changes discussed  Follow up as needed

## 2020-07-20 NOTE — PROGRESS NOTES
Coordination of Care  1. Have you been to the ER, urgent care clinic since your last visit? Hospitalized since your last visit? No    2. Have you seen or consulted any other health care providers outside of the [address] [place] [address] since your last visit? Include any pap smears or colon screening. No    Does the patient need refills? YES    Learning Assessment Complete? yes  Depression Screening complete in the past 12 months? yes    Per patient, no scale, BP machine, thermometer at home to check vitals at home.

## 2020-07-23 ENCOUNTER — OFFICE VISIT (OUTPATIENT)
Dept: FAMILY MEDICINE CLINIC | Age: 60
End: 2020-07-23

## 2020-07-23 DIAGNOSIS — Z71.89 COUNSELING AND COORDINATION OF CARE: Primary | ICD-10-CM

## 2020-07-28 ENCOUNTER — OFFICE VISIT (OUTPATIENT)
Dept: FAMILY MEDICINE CLINIC | Age: 60
End: 2020-07-28

## 2020-07-28 DIAGNOSIS — Z71.89 COUNSELING AND COORDINATION OF CARE: Primary | ICD-10-CM

## 2020-07-28 NOTE — PROGRESS NOTES
Iva Most Received signed form and POI for CC application. Completed application has been mailed to address on form.

## 2020-08-24 ENCOUNTER — OFFICE VISIT (OUTPATIENT)
Dept: FAMILY MEDICINE CLINIC | Age: 60
End: 2020-08-24

## 2020-08-24 DIAGNOSIS — Z71.89 COUNSELING AND COORDINATION OF CARE: Primary | ICD-10-CM

## 2020-08-24 PROCEDURE — 99080 SPECIAL REPORTS OR FORMS: CPT | Performed by: FAMILY MEDICINE

## 2020-08-24 NOTE — PROGRESS NOTES
Virtual visit with patient. Patient needed assistance with bills and phone numbers for other community resources.

## 2020-10-14 ENCOUNTER — HOSPITAL ENCOUNTER (EMERGENCY)
Age: 60
Discharge: HOME | End: 2020-10-14
Attending: STUDENT IN AN ORGANIZED HEALTH CARE EDUCATION/TRAINING PROGRAM
Payer: SUBSIDIZED

## 2020-10-14 VITALS
RESPIRATION RATE: 16 BRPM | SYSTOLIC BLOOD PRESSURE: 139 MMHG | TEMPERATURE: 97.3 F | BODY MASS INDEX: 21.22 KG/M2 | HEIGHT: 68 IN | DIASTOLIC BLOOD PRESSURE: 72 MMHG | HEART RATE: 75 BPM | WEIGHT: 140 LBS | OXYGEN SATURATION: 98 %

## 2020-10-14 DIAGNOSIS — R10.84 ABDOMINAL DISCOMFORT, GENERALIZED: ICD-10-CM

## 2020-10-14 DIAGNOSIS — K21.9 GASTROESOPHAGEAL REFLUX DISEASE: ICD-10-CM

## 2020-10-14 DIAGNOSIS — R42 DIZZY SPELLS: Primary | ICD-10-CM

## 2020-10-14 LAB
ALBUMIN SERPL-MCNC: 4.1 G/DL (ref 3.5–5)
ALBUMIN/GLOB SERPL: 1.3 {RATIO} (ref 1.1–2.2)
ALP SERPL-CCNC: 79 U/L (ref 45–117)
ALT SERPL-CCNC: 32 U/L (ref 12–78)
ANION GAP SERPL CALC-SCNC: 2 MMOL/L (ref 5–15)
APPEARANCE UR: CLEAR
AST SERPL-CCNC: 32 U/L (ref 15–37)
BASOPHILS # BLD: 0 K/UL (ref 0–0.1)
BASOPHILS NFR BLD: 0 % (ref 0–1)
BILIRUB SERPL-MCNC: 0.7 MG/DL (ref 0.2–1)
BILIRUB UR QL: NEGATIVE
BUN SERPL-MCNC: 6 MG/DL (ref 6–20)
BUN/CREAT SERPL: 7 (ref 12–20)
CALCIUM SERPL-MCNC: 8.4 MG/DL (ref 8.5–10.1)
CHLORIDE SERPL-SCNC: 107 MMOL/L (ref 97–108)
CO2 SERPL-SCNC: 29 MMOL/L (ref 21–32)
COLOR UR: NORMAL
COMMENT, HOLDF: NORMAL
CREAT SERPL-MCNC: 0.92 MG/DL (ref 0.7–1.3)
DIFFERENTIAL METHOD BLD: NORMAL
EOSINOPHIL # BLD: 0.1 K/UL (ref 0–0.4)
EOSINOPHIL NFR BLD: 2 % (ref 0–7)
ERYTHROCYTE [DISTWIDTH] IN BLOOD BY AUTOMATED COUNT: 13.8 % (ref 11.5–14.5)
GLOBULIN SER CALC-MCNC: 3.1 G/DL (ref 2–4)
GLUCOSE SERPL-MCNC: 125 MG/DL (ref 65–100)
GLUCOSE UR STRIP.AUTO-MCNC: NEGATIVE MG/DL
HCT VFR BLD AUTO: 39.2 % (ref 36.6–50.3)
HGB BLD-MCNC: 13.7 G/DL (ref 12.1–17)
HGB UR QL STRIP: NEGATIVE
IMM GRANULOCYTES # BLD AUTO: 0 K/UL (ref 0–0.04)
IMM GRANULOCYTES NFR BLD AUTO: 0 % (ref 0–0.5)
KETONES UR QL STRIP.AUTO: NEGATIVE MG/DL
LEUKOCYTE ESTERASE UR QL STRIP.AUTO: NEGATIVE
LYMPHOCYTES # BLD: 1.5 K/UL (ref 0.8–3.5)
LYMPHOCYTES NFR BLD: 27 % (ref 12–49)
MCH RBC QN AUTO: 30.1 PG (ref 26–34)
MCHC RBC AUTO-ENTMCNC: 34.9 G/DL (ref 30–36.5)
MCV RBC AUTO: 86.2 FL (ref 80–99)
MONOCYTES # BLD: 0.4 K/UL (ref 0–1)
MONOCYTES NFR BLD: 7 % (ref 5–13)
NEUTS SEG # BLD: 3.5 K/UL (ref 1.8–8)
NEUTS SEG NFR BLD: 64 % (ref 32–75)
NITRITE UR QL STRIP.AUTO: NEGATIVE
NRBC # BLD: 0 K/UL (ref 0–0.01)
NRBC BLD-RTO: 0 PER 100 WBC
PH UR STRIP: 6.5 [PH] (ref 5–8)
PLATELET # BLD AUTO: 238 K/UL (ref 150–400)
PMV BLD AUTO: 9.6 FL (ref 8.9–12.9)
POTASSIUM SERPL-SCNC: 3.8 MMOL/L (ref 3.5–5.1)
PROT SERPL-MCNC: 7.2 G/DL (ref 6.4–8.2)
PROT UR STRIP-MCNC: NEGATIVE MG/DL
RBC # BLD AUTO: 4.55 M/UL (ref 4.1–5.7)
SAMPLES BEING HELD,HOLD: NORMAL
SODIUM SERPL-SCNC: 138 MMOL/L (ref 136–145)
SP GR UR REFRACTOMETRY: <1.005 (ref 1–1.03)
UR CULT HOLD, URHOLD: NORMAL
UROBILINOGEN UR QL STRIP.AUTO: 0.2 EU/DL (ref 0.2–1)
WBC # BLD AUTO: 5.5 K/UL (ref 4.1–11.1)

## 2020-10-14 PROCEDURE — 99283 EMERGENCY DEPT VISIT LOW MDM: CPT

## 2020-10-14 PROCEDURE — 81003 URINALYSIS AUTO W/O SCOPE: CPT

## 2020-10-14 PROCEDURE — 80053 COMPREHEN METABOLIC PANEL: CPT

## 2020-10-14 PROCEDURE — 36415 COLL VENOUS BLD VENIPUNCTURE: CPT

## 2020-10-14 PROCEDURE — 85025 COMPLETE CBC W/AUTO DIFF WBC: CPT

## 2020-10-14 PROCEDURE — 83690 ASSAY OF LIPASE: CPT

## 2020-10-14 NOTE — PROGRESS NOTES
Provided  services remotely to Patric Candy Fofana during triage.               Celester Bang [employer]  [phone]

## 2020-10-14 NOTE — ED TRIAGE NOTES
[facility]  used for complete triage: Patient presents reporting that since February he has been dealing with stomach pain and weakness after eating. Patient reports the [facility] referred him to GI who used a camera and diagnosed him with Gastritis and Inflammation and prescribed him medications which he has been taking. Patient reports for the past 1-2 weeks after eating he has developed some dizziness along with his chronic pain. Patient is concerned that this is related to his BP and has told the  multiple times that he feels as though he is going to die. Patient is well appearing and does not appear to be in any acute distress in triage. Patient is unsure what medications he is taking- Patient denies vomiting, diarrhea or fevers.

## 2020-10-14 NOTE — ED PROVIDER NOTES
The history is provided by the patient. Abdominal Pain    This is a recurrent problem. The current episode started 2 days ago. The problem occurs daily. The problem has not changed since onset. The pain is associated with eating. The pain is located in the generalized abdominal region. The quality of the pain is cramping and dull. The pain is mild. Associated symptoms include nausea and back pain. Pertinent negatives include no fever, no diarrhea, no vomiting, no constipation and no chest pain. The pain is worsened by eating. The pain is relieved by nothing. Past workup includes CT scan (in early 2020 x2), esophagogastroduodenoscopy. His past medical history is significant for GERD (gastritis). The patient's surgical history non-contributory.        Past Medical History:   Diagnosis Date    BPH (benign prostatic hyperplasia)        Past Surgical History:   Procedure Laterality Date    UPPER GI ENDOSCOPY,BIOPSY  7/15/2020              Family History:   Problem Relation Age of Onset    Parkinsonism Mother     Arthritis-osteo Father     Diabetes Brother     Heart Disease Brother     Heart Surgery Brother        Social History     Socioeconomic History    Marital status: SINGLE     Spouse name: Not on file    Number of children: Not on file    Years of education: Not on file    Highest education level: Not on file   Occupational History    Not on file   Social Needs    Financial resource strain: Not on file    Food insecurity     Worry: Not on file     Inability: Not on file    Transportation needs     Medical: Not on file     Non-medical: Not on file   Tobacco Use    Smoking status: Never Smoker    Smokeless tobacco: Never Used   Substance and Sexual Activity    Alcohol use: No     Alcohol/week: 0.0 standard drinks    Drug use: No    Sexual activity: Yes     Partners: Female   Lifestyle    Physical activity     Days per week: Not on file     Minutes per session: Not on file    Stress: Not on file Relationships    Social connections     Talks on phone: Not on file     Gets together: Not on file     Attends Hindu service: Not on file     Active member of club or organization: Not on file     Attends meetings of clubs or organizations: Not on file     Relationship status: Not on file    Intimate partner violence     Fear of current or ex partner: Not on file     Emotionally abused: Not on file     Physically abused: Not on file     Forced sexual activity: Not on file   Other Topics Concern    Not on file   Social History Narrative    ** Merged History Encounter **         ** Merged History Encounter **              ALLERGIES: Patient has no known allergies. Review of Systems   Constitutional: Negative for fever. Respiratory: Negative for cough and shortness of breath. Cardiovascular: Negative for chest pain. Gastrointestinal: Positive for abdominal pain and nausea. Negative for constipation, diarrhea and vomiting. Musculoskeletal: Positive for back pain. Neurological: Positive for dizziness. Negative for weakness. All other systems reviewed and are negative. Vitals:    10/14/20 1706   BP: 123/71   Pulse: 75   Resp: 16   Temp: 97.3 °F (36.3 °C)   SpO2: 98%   Weight: 63.5 kg (140 lb)   Height: 5' 8.11\" (1.73 m)            Physical Exam  Vitals signs and nursing note reviewed. Constitutional:       General: He is not in acute distress. Appearance: He is well-developed. Comments: Thin     HENT:      Head: Normocephalic and atraumatic. Eyes:      Conjunctiva/sclera: Conjunctivae normal.   Neck:      Musculoskeletal: Normal range of motion and neck supple. Cardiovascular:      Rate and Rhythm: Normal rate and regular rhythm. Pulmonary:      Effort: Pulmonary effort is normal. No respiratory distress. Abdominal:      Palpations: Abdomen is soft. Tenderness: There is no abdominal tenderness. There is no guarding or rebound. Musculoskeletal: Normal range of motion. Skin:     General: Skin is warm and dry. Neurological:      Mental Status: He is alert and oriented to person, place, and time. Gait: Gait is intact. MDM       Procedures    A/P: This is a 70-year-old male with history of gastritis who presents to the ED with 3 days of increased abdominal pain, mostly with eating, fatigue and malaise, dizziness while at work. Concerned about his blood pressure. Not on any antihypertensive at this time. Denies any blood in stool. Exam benign with normotensive blood pressure reading here. Abdominal exam also benign. Plan to obtain labs to screen for other causes for his symptoms such as metabolic abnormality, anemia, pancreatitis. Very much doubt acute appendicitis, acute cholecystitis, small bowel obstruction, kidney stone.

## 2020-10-14 NOTE — DISCHARGE INSTRUCTIONS
Patient Education        Dolor abdominal: Instrucciones de cuidado  Abdominal Pain: Care Instructions  Instrucciones de cuidado    El dolor abdominal tiene muchas causas posibles. Algunas de ellas no son graves y mejoran por sí solas en unos días. Otras requieren [place] y [place]. Si nixon dolor continúa o [place], necesitará gavin nueva revisión y [place] pruebas para determinar qué pasa. Es posible que necesite cirugía para corregir el problema. No ignore nuevos síntomas, katrin fiebre, náuseas y [place], [facility] [place], dolor que [place] o Susana. Podrían ser señales de un problema más grave. Nixon médico puede haberle recomendado gavin consulta de Dashawn & Dandre las 8 o 12 horas siguientes. Si no se siente mejor, es posible que requiera [place] o [place]. El médico lo bravo revisado minuciosamente, oksana puede astrid problemas más tarde. Si nota algún problema o síntomas nuevos, busque tratamiento médico inmediatamente. La atención de seguimiento es gavin parte clave de nixon tratamiento y seguridad. Asegúrese de hacer y acudir a todas las citas, y llame a nixon médico si está teniendo problemas. También es gavin buena idea saber los resultados de amena exámenes y mantener gavin lista de los medicamentos que alesia. ¿Cómo puede cuidarse en el hogar? · Descanse hasta que se sienta mejor. · Para prevenir la deshidratación, royce abundantes líquidos, suficientes para que nixon orina sea de color amarillo alycia o transparente katrin el agua. Elija beber agua y otros líquidos shayne sin cafeína hasta que se sienta mejor. Si tiene [place] [facility] [employer], del corazón o del hígado y tiene que Byron's líquidos, hable con nixon médico antes de aumentar nixon consumo. · Si tiene [employer], coma alimentos suaves, katrin arroz, pan mya seco o galletas saladas, bananas (plátanos) y puré de Synchari. Trate de comer varias comidas pequeñas al día en lugar de dos o lynette grandes.   · Espere hasta 48 horas después de que todos los síntomas hayan desaparecido antes de comer alimentos condimentados, alcohol y bebidas que contengan cafeína. · No consuma alimentos ricos en grasa. · Evite medicamentos antiinflamatorios katrin aspirina, ibuprofeno (Advil, Motrin) y naproxeno (Aleve). Pueden causar [employer]. Dígale a nixon médico si está tomando aspirina diariamente debido a otro problema de amy. ¿Cuándo debe pedir ayuda? Llame al 911 en cualquier momento que considere que necesita atención de emergencia. Por ejemplo, llame si:    · Se desmayó (perdió el conocimiento).   · Las heces son de color rojizo o muy sanguinolentas (con chavez).   · Vomita chavez o algo parecido a granos de café molido.     · Tiene dolor abdominal nuevo e intenso. Llame a nixon médico ahora mismo o busque atención médica inmediata si:    · Nixon dolor empeora, sobre todo si se concentra en gavin candelario parte del vientre.     · Vuelve a tener fiebre o tiene fiebre más mino.     · Mariya heces son negruzcas y parecidas al alquitrán o tienen rastros de chavez.     · Tiene sangrado vaginal inesperado.     · Tiene síntomas de gavin infección del tracto urinario. Estos podrían incluir:  ? Dolor al Rafaela Riding. ? Orinar con más frecuencia que lo habitual.  ? Chavez en la [facility].     · Siente mareos o aturdimiento, o que está a punto de desmayarse. Preste especial atención a los cambios en nixon amy y asegúrese de comunicarse con nixon médico si:    · No está mejorando después de 1 día (24 horas). ¿Dónde puede encontrar más información en inglés? Keely Hernández a http://www.kim.com/  Heath Osborn C409 en la búsqueda para aprender más acerca de \"Dolor abdominal: Instrucciones de cuidado. \"  Revisado: 26 junio, 4566               LUDA del contenido: 12.6  © 3608-3611 [employer]. Las instrucciones de cuidado fueron adaptadas bajo licencia por Good Help Connections (which disclaims liability or warranty for this information).  Si usted tiene preguntas sobre gavin afección médica o sobre estas instrucciones, siempre pregunte a nixon profesional de amy. Healthwise, Incorporated niega toda garantía o responsabilidad por nixon uso de esta información. Patient Education        Mareos: Instrucciones de cuidado  Dizziness: Care Instructions  Instrucciones de cuidado  Los mareos son Catherene More sensación de inestabilidad o confusión en la kathie. Son distintos al vértigo, gavin sensación de que la habitación gira o de que usted se mueve o . También es distinto del aturdimiento, que es la sensación de que está a punto de desmayarse. Puede resultar difícil conocer la causa de los Susana. Algunas personas se sienten mareadas cuando tienen migrañas. A veces, los episodios gripales pueden hacer que se sienta mareado. Algunas afecciones médicas, katrin los problemas cardíacos o la presión arterial mino, pueden hacer que se sienta mareado. Muchos medicamentos pueden causar mareos, katrin los que se usan para la presión arterial mino, el dolor o la ansiedad. Si es un medicamento el que está causando los síntomas, nixon médico podría recomendarle que lo cambie o deje de tomarlo. Si es un problema cardíaco, podría necesitar medicamentos para ayudar a que nixon corazón funcione mejor. Si no hay razón aparente para los síntomas, nixon médico podría sugerir vigilar y esperar lacy un tiempo para mars si los mareos desaparecen por sí solos. La atención de seguimiento es gavin parte clave de nixon tratamiento y seguridad. Asegúrese de hacer y acudir a todas las citas, y llame a nixon médico si está teniendo problemas. También es gavin buena idea saber los resultados de amena exámenes y mantener gavin lista de los medicamentos que alesia. ¿Cómo puede cuidarse en el hogar? · Si nixon médico le recomienda o receta medicamentos, tómelos exactamente según las indicaciones. Llame a nixon médico si richard estar teniendo un problema con nixon medicamento. · No conduzca mientras se sienta mareado.   · [address] caídas. Algunas medidas que puede dhiraj son:  ? Usar tapetes antideslizantes, agregar agarraderas cerca de la michael y usar luces nocturnas. ? Ordenar nixon casa de ugilherme manera que en los senderos no haya nada con lo que se pueda tropezar. ? Avisarles a familiares y [facility] que se ha estado sintiendo [place]. [place] les servirá para saber cómo ayudarle. ¿Cuándo debe pedir ayuda? Llame al 911 en cualquier momento que considere que necesita atención de [place]. Por ejemplo, llame si:    · Se desmayó (perdió el conocimiento).     · Tiene mareos junto con síntomas de un ataque cardíaco. Estos pueden incluir:  ? Dolor de [place], o presión o gavin sensación extraña en el pecho.  ? Sudoración. ? Falta de aire. ? Náuseas o vómito. ? Dolor, presión, o gavin sensación extraña en la espalda, el estrella, la mandíbula o el abdomen superior, o en merissa o ambos hombros o brazos. ? Aturdimiento o debilidad repentina. ? Un latido cardíaco rápido o irregular.     · Tiene síntomas de un ataque cerebral. Estos pueden incluir:  ? Entumecimiento, hormigueo, debilidad o parálisis repentinos en la marjorie, el brazo o la pierna, sobre todo si ocurre en un solo lado del cuerpo. ? Cambios súbitos en la vista. ? Problemas repentinos para hablar. ? Confusión súbita o dificultad repentina para comprender frases sencillas. ? Problemas repentinos para caminar o mantener el equilibrio. ? Un dolor de kathie intenso y repentino, distinto a los leighton de Marysamantha Best. Llame a nixon médico ahora mismo o busque atención médica inmediata si:    · Se siente mareado y tiene [place], dolor de kathie o zumbido en los oídos.     · Tiene nuevas náuseas y vómito o estos aumentan.     · Mariya mareos no desaparecen o regresan. Preste especial atención a los cambios en nixon amy y asegúrese de comunicarse con nixon médico si:    · No mejora katrin se esperaba. ¿Dónde puede encontrar más información en inglés?   Matt Rosas a [email or web address]/  Gearl Erps J401 en la búsqueda para aprender más acerca de \"Mareos: Instrucciones de cuidado. \"  Revisado: 26 junio, 6074               SDGQTMU del contenido: 12.6  © 4141-4571 Healthwise, Incorporated. Las instrucciones de cuidado fueron adaptadas bajo licencia por Good Help Connections (which disclaims liability or warranty for this information). Si usted tiene [place] afección médica o sobre estas instrucciones, siempre pregunte a nixon profesional de amy. Healthwise, Incorporated niega toda garantía o responsabilidad por nixon uso de esta información.

## 2020-10-15 ENCOUNTER — PATIENT OUTREACH (OUTPATIENT)
Dept: FAMILY MEDICINE CLINIC | Age: 60
End: 2020-10-15

## 2020-10-15 ENCOUNTER — VIRTUAL VISIT (OUTPATIENT)
Dept: FAMILY MEDICINE CLINIC | Age: 60
End: 2020-10-15

## 2020-10-15 DIAGNOSIS — K21.9 GASTROESOPHAGEAL REFLUX DISEASE, UNSPECIFIED WHETHER ESOPHAGITIS PRESENT: Primary | ICD-10-CM

## 2020-10-15 DIAGNOSIS — R63.6 LOW WEIGHT FOR HEIGHT: ICD-10-CM

## 2020-10-15 LAB — LIPASE SERPL-CCNC: 102 U/L (ref 73–393)

## 2020-10-15 PROCEDURE — 99442 PR PHYS/QHP TELEPHONE EVALUATION 11-20 MIN: CPT | Performed by: PHYSICIAN ASSISTANT

## 2020-10-15 NOTE — PROGRESS NOTES
Patient states he is out at the store and patient does not have access to vital sign equipment. Jose Maria Watts CMA    Coordination of Care  1. Have you been to the ER, urgent care clinic since your last visit? Hospitalized since your last visit? Yes When: [facility]/2020[facility]- Abdominal pain    2. Have you seen or consulted any other health care providers outside of the [address] [place] [address] since your last visit? Include any pap smears or colon screening. No    Does the patient need refills? NO    Learning Assessment Complete?  yes  Depression Screening complete in the past 12 months? yes

## 2020-10-15 NOTE — PROGRESS NOTES
Assessment/Plan:    Diagnoses and all orders for this visit:    1. Gastroesophageal reflux disease, unspecified whether esophagitis present    2. Low weight for height    Assessment/Plan:    Diagnoses and all orders for this visit:    1. Gastroesophageal reflux disease, unspecified whether esophagitis present    2. Low weight for height    He was advised to f/up with GI for ongoing, possibly worsening sxs. He is to find his medication from urologist and let us know what it is. He has a bill from urology that he is concerned about- I have sent a message to AN coordinator to help the pt     Follow-up and Dispositions    · Return in about 4 weeks (around 2020). MARK Hendrix expressed understanding of this plan. An AVS was printed and given to the patient.      ----------------------------------------------------------------------    Chief Complaint   Patient presents with   [facility] Follow Up    Other     Pt states is taking one more medicine prescribed by the Urologist but he doesn't remember the name of the medicine       History of Present Illness:  Pt called and consented to virtual telephone visit, he declined video  He was identified by name and     He is being seen today for ER f/up from yesterday  He went to the ER bc he was feeling poorly and wanted to make sure it wasn't a bp problem or a problem with his blood work. We have discussed his results today from those tests (all normal)  He has chronic gastritis pain he states. He takes the bentyl and protonix as directed and still has pain daily. He is very thin, he states, because he is afraid to eat. He eats only 2 times a day and only a very limited number of foods. The fear of the severe pain keeps him from eating.      He was rx'd a med from urologist but he [facility] the med [facility] the [facility] so does not have it and doesn't know what it is        Past Medical History:   Diagnosis Date    BPH (benign prostatic hyperplasia)        Current Outpatient Medications   Medication Sig Dispense Refill    dicyclomine (BENTYL) 20 mg tablet Take 1 Tab by mouth three (3) times daily (with meals). 90 Tab 5    pantoprazole (PROTONIX) 40 mg tablet Take 1 Tab by mouth daily. 90 Tab 2       No Known Allergies    Social History     Tobacco Use    Smoking status: Never Smoker    Smokeless tobacco: Never Used   Substance Use Topics    Alcohol use: No     Alcohol/week: 0.0 standard drinks    Drug use: No       Family History   Problem Relation Age of Onset    Parkinsonism Mother     Arthritis-osteo Father     Diabetes Brother     Heart Disease Brother     Heart Surgery Brother        Physical Exam:     There were no vitals taken for this visit. A&Ox3  WDWN NAD  Respirations normal and non labored      Follow-up and Dispositions    · Return in about 4 weeks (around 11/12/2020). Vessie Rao McFerren, PA-C Jose I Severiano Chinchilla expressed understanding of this plan. An AVS was printed and given to the patient.      ----------------------------------------------------------------------    Chief Complaint   Patient presents with   [facility] Follow Up    Other     Pt states is taking one more medicine prescribed by the Urologist but he doesn't remember the name of the medicine       History of Present Illness:        Past Medical History:   Diagnosis Date    BPH (benign prostatic hyperplasia)        Current Outpatient Medications   Medication Sig Dispense Refill    dicyclomine (BENTYL) 20 mg tablet Take 1 Tab by mouth three (3) times daily (with meals). 90 Tab 5    pantoprazole (PROTONIX) 40 mg tablet Take 1 Tab by mouth daily.  90 Tab 2       No Known Allergies    Social History     Tobacco Use    Smoking status: Never Smoker    Smokeless tobacco: Never Used   Substance Use Topics    Alcohol use: No     Alcohol/week: 0.0 standard drinks    Drug use: No       Family History   Problem Relation Age of Onset  Parkinsonism Mother     Arthritis-osteo Father     Diabetes Brother     Heart Disease Brother     Heart Surgery Brother        Physical Exam:     There were no vitals taken for this visit.     A&Ox3  WDWN NAD  Respirations normal and non labored

## 2020-10-15 NOTE — Clinical Note
Please help him get a f/up with Dr Lilibeth Zuniga, GI doc that he saw through [facility] in 3/20.  He thinks he is still active

## 2020-10-20 ENCOUNTER — TELEPHONE (OUTPATIENT)
Dept: FAMILY MEDICINE CLINIC | Age: 60
End: 2020-10-20

## 2020-10-20 NOTE — PROGRESS NOTES
10/20/20     ED Discharge Follow-Up    Date/Time:     10/20/2020 3:01 PM    Patient presented to [place] [address]  ED on 20 and was diagnosed with GI sx. Top Challenges reviewed with the provider   Paraguayan speaking only, uninsured, chronic GI concerns. Method of communication with provider :chart routing, staff message, phone    Nurse Navigator(NN) contacted the  patient  by telephone to perform post ED discharge assessment. Verified name and  with patient as identifiers. Provided introduction to self, and explanation of the Nurse Navigator role. Patient reported assessment: Abd pain, weak, and tired. Medication(s):   New Medications at Discharge: protonix, bentyl  Changed Medications at Discharge: na  Discontinued Medications at Discharge: na    There were no barriers to obtaining medications identified at this time. Reviewed discharge instructions and red flags with  patient who voiced understanding. Patient given an opportunity to ask questions and does not have any further questions or concerns at this time. The patient agrees to contact the PCP office for questions related to their healthcare. Patient reminded that there are physicians on call 24 hours a day / 7 days a week (M- 5pm to 8am and from Friday 5pm until Monday 8am for the weekend) should the patient have questions or concerns. NN provided contact information for future reference. Offered follow up appointment with PCP: yes, TBD  [facility] follow up appointment(s): No future appointments. Non-[facility] follow up appointment(s): na  [facility]:  offered and patient declined    www. [email or web address] 9 AM- 9 PM.   Phone [phone]      EDUCATION: refill process, taking medications consistently, contacting NN with concerns. Goals      Develop action plan for Self-Management Chronic Disease.       10/20/20  Voncile Button will continue taking medications as directed, notify NN/PCP with any concerns, attend FU appts. FU with PCP in 4 weeks. NN reviewed medications and refill process. FU in 2 weeks.  Im

## 2020-10-23 ENCOUNTER — TELEPHONE (OUTPATIENT)
Dept: FAMILY MEDICINE CLINIC | Age: 60
End: 2020-10-23

## 2020-10-23 NOTE — TELEPHONE ENCOUNTER
Message received from Kailee Barrios that the patient had a question about a bill he received from his [facility] Urologist.    T/C made to the patient x2 today. There was no answer and the greeting stated that the voicemail has not been set-up so no message could be left. For questions regarding bills from [facility] providers, the patient should call [facility] at [phone] and they will be able to assist him.  Gene Quick RN

## 2020-10-26 ENCOUNTER — DOCUMENTATION ONLY (OUTPATIENT)
Dept: FAMILY MEDICINE CLINIC | Age: 60
End: 2020-10-26

## 2020-10-26 NOTE — PROGRESS NOTES
Per Dr. Kecia Santos MD, Patient has had chronic abdominal pains. [facility] that did not reveal any mass, but showed constipation, he also had a EGD that revealed chronic gastritis and duodenitis. He also has seen urology, he has an enlarged prostate. We have not seen him face to face recently so the best thing we can do is a face to face visit to evaluate him. Staff notes sent to nurses and front office to let patient know and to schedule with Dr. Kecia Santos.

## 2020-10-29 ENCOUNTER — HOSPITAL ENCOUNTER (OUTPATIENT)
Dept: LAB | Age: 60
Discharge: HOME | End: 2020-10-29

## 2020-10-29 ENCOUNTER — OFFICE VISIT (OUTPATIENT)
Dept: FAMILY MEDICINE CLINIC | Age: 60
End: 2020-10-29

## 2020-10-29 VITALS
SYSTOLIC BLOOD PRESSURE: 124 MMHG | HEIGHT: 69 IN | HEART RATE: 67 BPM | DIASTOLIC BLOOD PRESSURE: 67 MMHG | TEMPERATURE: 98 F | BODY MASS INDEX: 20.44 KG/M2 | WEIGHT: 138 LBS

## 2020-10-29 DIAGNOSIS — K21.9 GASTROESOPHAGEAL REFLUX DISEASE, UNSPECIFIED WHETHER ESOPHAGITIS PRESENT: Primary | ICD-10-CM

## 2020-10-29 DIAGNOSIS — L98.9 SKIN LESION OF RIGHT LEG: ICD-10-CM

## 2020-10-29 DIAGNOSIS — R10.9 ABDOMINAL PAIN, UNSPECIFIED ABDOMINAL LOCATION: ICD-10-CM

## 2020-10-29 DIAGNOSIS — R73.09 ELEVATED GLUCOSE LEVEL: ICD-10-CM

## 2020-10-29 DIAGNOSIS — N40.1 BENIGN PROSTATIC HYPERPLASIA WITH LOWER URINARY TRACT SYMPTOMS, SYMPTOM DETAILS UNSPECIFIED: ICD-10-CM

## 2020-10-29 LAB
ALBUMIN SERPL-MCNC: 4.2 G/DL (ref 3.5–5)
ALBUMIN/GLOB SERPL: 1.4 {RATIO} (ref 1.1–2.2)
ALP SERPL-CCNC: 82 U/L (ref 45–117)
ALT SERPL-CCNC: 34 U/L (ref 12–78)
ANION GAP SERPL CALC-SCNC: 5 MMOL/L (ref 5–15)
AST SERPL-CCNC: 25 U/L (ref 15–37)
BASOPHILS # BLD: 0 K/UL (ref 0–0.1)
BASOPHILS NFR BLD: 0 % (ref 0–1)
BILIRUB SERPL-MCNC: 0.5 MG/DL (ref 0.2–1)
BUN SERPL-MCNC: 7 MG/DL (ref 6–20)
BUN/CREAT SERPL: 8 (ref 12–20)
CALCIUM SERPL-MCNC: 9.4 MG/DL (ref 8.5–10.1)
CHLORIDE SERPL-SCNC: 103 MMOL/L (ref 97–108)
CO2 SERPL-SCNC: 30 MMOL/L (ref 21–32)
CREAT SERPL-MCNC: 0.83 MG/DL (ref 0.7–1.3)
DIFFERENTIAL METHOD BLD: NORMAL
EOSINOPHIL # BLD: 0.1 K/UL (ref 0–0.4)
EOSINOPHIL NFR BLD: 3 % (ref 0–7)
ERYTHROCYTE [DISTWIDTH] IN BLOOD BY AUTOMATED COUNT: 14.1 % (ref 11.5–14.5)
EST. AVERAGE GLUCOSE BLD GHB EST-MCNC: 111 MG/DL
GLOBULIN SER CALC-MCNC: 3 G/DL (ref 2–4)
GLUCOSE SERPL-MCNC: 82 MG/DL (ref 65–100)
HBA1C MFR BLD: 5.5 % (ref 4–5.6)
HCT VFR BLD AUTO: 43.8 % (ref 36.6–50.3)
HGB BLD-MCNC: 14.8 G/DL (ref 12.1–17)
IMM GRANULOCYTES # BLD AUTO: 0 K/UL (ref 0–0.04)
IMM GRANULOCYTES NFR BLD AUTO: 0 % (ref 0–0.5)
LYMPHOCYTES # BLD: 1.1 K/UL (ref 0.8–3.5)
LYMPHOCYTES NFR BLD: 21 % (ref 12–49)
MCH RBC QN AUTO: 30.3 PG (ref 26–34)
MCHC RBC AUTO-ENTMCNC: 33.8 G/DL (ref 30–36.5)
MCV RBC AUTO: 89.8 FL (ref 80–99)
MONOCYTES # BLD: 0.5 K/UL (ref 0–1)
MONOCYTES NFR BLD: 10 % (ref 5–13)
NEUTS SEG # BLD: 3.3 K/UL (ref 1.8–8)
NEUTS SEG NFR BLD: 66 % (ref 32–75)
NRBC # BLD: 0 K/UL (ref 0–0.01)
NRBC BLD-RTO: 0 PER 100 WBC
PLATELET # BLD AUTO: 264 K/UL (ref 150–400)
PMV BLD AUTO: 10.7 FL (ref 8.9–12.9)
POTASSIUM SERPL-SCNC: 4.9 MMOL/L (ref 3.5–5.1)
PROT SERPL-MCNC: 7.2 G/DL (ref 6.4–8.2)
PSA SERPL-MCNC: 2.5 NG/ML (ref 0.01–4)
RBC # BLD AUTO: 4.88 M/UL (ref 4.1–5.7)
SODIUM SERPL-SCNC: 138 MMOL/L (ref 136–145)
TSH SERPL DL<=0.05 MIU/L-ACNC: 1.99 UIU/ML (ref 0.36–3.74)
WBC # BLD AUTO: 5.1 K/UL (ref 4.1–11.1)

## 2020-10-29 PROCEDURE — 99213 OFFICE O/P EST LOW 20 MIN: CPT | Performed by: FAMILY MEDICINE

## 2020-10-29 PROCEDURE — 83036 HEMOGLOBIN GLYCOSYLATED A1C: CPT

## 2020-10-29 PROCEDURE — 80053 COMPREHEN METABOLIC PANEL: CPT

## 2020-10-29 PROCEDURE — 84443 ASSAY THYROID STIM HORMONE: CPT

## 2020-10-29 PROCEDURE — 85025 COMPLETE CBC W/AUTO DIFF WBC: CPT

## 2020-10-29 PROCEDURE — 84153 ASSAY OF PSA TOTAL: CPT

## 2020-10-29 RX ORDER — FAMOTIDINE 40 MG/1
40 TABLET, FILM COATED ORAL DAILY
Qty: 90 TAB | Refills: 1 | Status: SHIPPED | OUTPATIENT
Start: 2020-10-29 | End: 2021-03-23 | Stop reason: ALTCHOICE

## 2020-10-29 NOTE — PROGRESS NOTES
HISTORY OF PRESENT ILLNESS  Lulu Olivier I Nara Cintron is a 61 y.o. male. HPI  Patient states a few days ago he was having a sensation of dizziness after eating. States he is taking a medication for prostate written by the urology. He is taking pantoprazole once a day and bentyl as needed. The symptoms have improve but he has to be very careful in what he eats. Patient does have bowel movements and he reports they are normal.  Patient states he is sleeping well but he does get a pain from the hip down. During his last Emergency room visit he was well except the sugar. Also patient has a skin lesion for about 4 years on the skin of right knee. Is has not healed. Review of Systems   Constitutional: Negative for chills, fever and weight loss. Cardiovascular: Negative for chest pain, palpitations and orthopnea. Gastrointestinal: Positive for abdominal pain and heartburn. Genitourinary: Negative for dysuria, frequency and urgency. Musculoskeletal: Negative for back pain, myalgias and neck pain. Skin:        Skin lesion   /67 (BP 1 Location: Left arm, BP Patient Position: Sitting)   Pulse 67   Temp 98 °F (36.7 °C)   Ht 5' 9.29\" (1.76 m)   Wt 138 lb (62.6 kg)   BMI 20.21 kg/m²   Physical Exam  Constitutional:       General: He is not in acute distress. Appearance: He is not ill-appearing. HENT:      Right Ear: Tympanic membrane normal. There is no impacted cerumen. Left Ear: Tympanic membrane normal. There is no impacted cerumen. Nose: Nose normal. No congestion or rhinorrhea. Cardiovascular:      Rate and Rhythm: Normal rate and regular rhythm. Pulses: Normal pulses. Heart sounds: No murmur. Pulmonary:      Effort: Pulmonary effort is normal.      Breath sounds: Normal breath sounds. No wheezing or rhonchi. Abdominal:      General: Bowel sounds are normal. There is no distension. Palpations: Abdomen is soft. There is no mass.    Musculoskeletal: Normal range of motion. General: No swelling, tenderness or deformity. Skin:     Comments: There is a 3 cm nonhealing skin lesion over the right knee   Neurological:      Mental Status: He is alert. ASSESSMENT and PLAN  Diagnoses and all orders for this visit:    1. Gastroesophageal reflux disease, unspecified whether esophagitis present  -     famotidine (PEPCID) 40 mg tablet; Take 1 Tab by mouth daily. 2. Skin lesion of right leg  -     REFERRAL TO DERMATOLOGY    3. Benign prostatic hyperplasia with lower urinary tract symptoms, symptom details unspecified  -     PSA, DIAGNOSTIC (PROSTATE SPECIFIC AG); Future    4. Abdominal pain, unspecified abdominal location  -     CBC WITH AUTOMATED DIFF; Future  -     METABOLIC PANEL, COMPREHENSIVE; Future  -     TSH 3RD GENERATION; Future    5. Elevated glucose level  -     HEMOGLOBIN A1C WITH EAG;  Future      We will add pepcid for the gastritis and reflux  He has a skin lesion that has not healed for the past 4 years, we will refer to dermatology  Check labs for abdominal pain, include cbc, cmp, tsh  Had elevated glucose level in the hospital,  Has family history of diabetes, we will check hemoglobin a1c  Unclear the reason for his chronic abdominal pain,  Has gastritis in EGD, and 5 years ago had normal colonoscopy  We will meet again in 2 months to follow up on his condition

## 2020-10-29 NOTE — PROGRESS NOTES
Coordination of Care  1. Have you been to the ER, urgent care clinic since your last visit? Hospitalized since your last visit? Yes([facility]) / 10/14/20 / Abdominal pain    2. Have you seen or consulted any other health care providers outside of the [address] [place] [address] since your last visit? Include any pap smears or colon screening. No    Does the patient need refills? NO    Learning Assessment Complete?  yes  Depression Screening complete in the past 12 months? yes

## 2020-10-29 NOTE — PROGRESS NOTES
Check-out Note: Labs today   Good rx   Refuses flu and td   Return in about 6 weeks (around 12/10/2020) for face to face visit, follow up abdominal pain. Reviewed AVS, prescription and pharmacy location with patient. AVS printed and given along with goodrx coupon card. Patient aware that provider would like to follow up about today's visit as stated above. Patient states that he has a bill from a specialist when he went to a visit with [facility], nurse instructed patient to call access now to let them know and they will be able to tell him what to do. A dermatology [facility] referral has been placed; per patient he is up to date on his eligibility, discussed that once the referral nurse reviews his information he should receive a phone call as to when to call access now to request appt. With dermatologist.  This has been fully explained to the patient, who indicates understanding and agrees with plan. No further questions at this time.  Stanley Olivarez, RN

## 2020-11-02 NOTE — PROGRESS NOTES
Lab results are normal  Normal sugar level, hemoglobin a1c, kidney function, liver function, electrolytes, blood count  Thyroid and prostate  Patient can be informed

## 2020-11-03 ENCOUNTER — OFFICE VISIT (OUTPATIENT)
Dept: FAMILY MEDICINE CLINIC | Age: 60
End: 2020-11-03

## 2020-11-03 DIAGNOSIS — Z71.89 COUNSELING AND COORDINATION OF CARE: Primary | ICD-10-CM

## 2020-11-03 PROCEDURE — 99080 SPECIAL REPORTS OR FORMS: CPT | Performed by: FAMILY MEDICINE

## 2020-12-01 ENCOUNTER — OFFICE VISIT (OUTPATIENT)
Dept: FAMILY MEDICINE CLINIC | Age: 60
End: 2020-12-01

## 2020-12-01 DIAGNOSIS — Z71.89 COUNSELING AND COORDINATION OF CARE: Primary | ICD-10-CM

## 2020-12-01 PROCEDURE — 99080 SPECIAL REPORTS OR FORMS: CPT | Performed by: FAMILY MEDICINE

## 2021-01-15 ENCOUNTER — HOSPITAL ENCOUNTER (EMERGENCY)
Age: 61
Discharge: HOME | End: 2021-01-15
Attending: EMERGENCY MEDICINE
Payer: SUBSIDIZED

## 2021-01-15 ENCOUNTER — APPOINTMENT (OUTPATIENT)
Dept: GENERAL RADIOLOGY | Age: 61
End: 2021-01-15
Attending: EMERGENCY MEDICINE
Payer: SUBSIDIZED

## 2021-01-15 VITALS
HEIGHT: 69 IN | DIASTOLIC BLOOD PRESSURE: 74 MMHG | WEIGHT: 140 LBS | SYSTOLIC BLOOD PRESSURE: 150 MMHG | BODY MASS INDEX: 20.73 KG/M2 | TEMPERATURE: 100.7 F | HEART RATE: 77 BPM | OXYGEN SATURATION: 98 % | RESPIRATION RATE: 18 BRPM

## 2021-01-15 DIAGNOSIS — L03.115 CELLULITIS OF RIGHT LOWER EXTREMITY: Primary | ICD-10-CM

## 2021-01-15 LAB
COMMENT, HOLDF: NORMAL
SAMPLES BEING HELD,HOLD: NORMAL

## 2021-01-15 PROCEDURE — 74011250636 HC RX REV CODE- 250/636: Performed by: EMERGENCY MEDICINE

## 2021-01-15 PROCEDURE — 73562 X-RAY EXAM OF KNEE 3: CPT

## 2021-01-15 PROCEDURE — 74011250637 HC RX REV CODE- 250/637: Performed by: EMERGENCY MEDICINE

## 2021-01-15 PROCEDURE — 99283 EMERGENCY DEPT VISIT LOW MDM: CPT

## 2021-01-15 PROCEDURE — 90715 TDAP VACCINE 7 YRS/> IM: CPT | Performed by: EMERGENCY MEDICINE

## 2021-01-15 PROCEDURE — 90471 IMMUNIZATION ADMIN: CPT

## 2021-01-15 RX ORDER — CLINDAMYCIN HYDROCHLORIDE 150 MG/1
450 CAPSULE ORAL
Status: COMPLETED | OUTPATIENT
Start: 2021-01-15 | End: 2021-01-15

## 2021-01-15 RX ORDER — LIDOCAINE HYDROCHLORIDE AND EPINEPHRINE 10; 10 MG/ML; UG/ML
1.5 INJECTION, SOLUTION INFILTRATION; PERINEURAL ONCE
Status: DISCONTINUED | OUTPATIENT
Start: 2021-01-15 | End: 2021-01-16 | Stop reason: HOSPADM

## 2021-01-15 RX ORDER — IBUPROFEN 600 MG/1
600 TABLET ORAL ONCE
Status: COMPLETED | OUTPATIENT
Start: 2021-01-15 | End: 2021-01-15

## 2021-01-15 RX ORDER — CLINDAMYCIN HYDROCHLORIDE 150 MG/1
450 CAPSULE ORAL 3 TIMES DAILY
Qty: 63 CAP | Refills: 0 | Status: SHIPPED | OUTPATIENT
Start: 2021-01-15 | End: 2021-01-22

## 2021-01-15 RX ORDER — NAPROXEN 500 MG/1
500 TABLET ORAL 2 TIMES DAILY WITH MEALS
Qty: 20 TAB | Refills: 0 | Status: SHIPPED | OUTPATIENT
Start: 2021-01-15 | End: 2021-01-25

## 2021-01-15 RX ADMIN — TETANUS TOXOID, REDUCED DIPHTHERIA TOXOID AND ACELLULAR PERTUSSIS VACCINE, ADSORBED 0.5 ML: 5; 2.5; 8; 8; 2.5 SUSPENSION INTRAMUSCULAR at 22:10

## 2021-01-15 RX ADMIN — IBUPROFEN 600 MG: 600 TABLET, FILM COATED ORAL at 22:10

## 2021-01-15 RX ADMIN — CLINDAMYCIN HYDROCHLORIDE 450 MG: 150 CAPSULE ORAL at 22:10

## 2021-01-15 NOTE — ED TRIAGE NOTES
Souleymane used. #[id number]. Pt here with right knee pain. H/O \"hive\" on right knee which was treated with cream. Pt states today all of his joints are hurting, greatest in right knee.

## 2021-01-15 NOTE — ED PROVIDER NOTES
27-year-old gentleman with history of BPH presents emerged department today complaining of right knee pain. He has had a \"hive\" there for several days and is applying cream to it. Today he presents as the pain is worsening and he has a fever with diffuse myalgias. The history is provided by the patient and medical records. Knee Pain   This is a new problem. The current episode started more than 2 days ago. The problem occurs constantly. The problem has been gradually worsening. The pain is present in the right knee. The pain is moderate. Pertinent negatives include no numbness, no stiffness, no tingling, no itching, no back pain and no neck pain. He has tried OTC ointments for the symptoms. There has been no history of extremity trauma.         Past Medical History:   Diagnosis Date    BPH (benign prostatic hyperplasia)        Past Surgical History:   Procedure Laterality Date    UPPER GI ENDOSCOPY,BIOPSY  7/15/2020              Family History:   Problem Relation Age of Onset    Parkinsonism Mother     Arthritis-osteo Father     Diabetes Brother     Heart Disease Brother     Heart Surgery Brother        Social History     Socioeconomic History    Marital status: SINGLE     Spouse name: Not on file    Number of children: Not on file    Years of education: Not on file    Highest education level: Not on file   Occupational History    Not on file   Social Needs    Financial resource strain: Not on file    Food insecurity     Worry: Not on file     Inability: Not on file    Transportation needs     Medical: Not on file     Non-medical: Not on file   Tobacco Use    Smoking status: Never Smoker    Smokeless tobacco: Never Used   Substance and Sexual Activity    Alcohol use: No     Alcohol/week: 0.0 standard drinks    Drug use: No    Sexual activity: Yes     Partners: Female   Lifestyle    Physical activity     Days per week: Not on file     Minutes per session: Not on file    Stress: Not on file Relationships    Social connections     Talks on phone: Not on file     Gets together: Not on file     Attends Restorationist service: Not on file     Active member of club or organization: Not on file     Attends meetings of clubs or organizations: Not on file     Relationship status: Not on file    Intimate partner violence     Fear of current or ex partner: Not on file     Emotionally abused: Not on file     Physically abused: Not on file     Forced sexual activity: Not on file   Other Topics Concern    Not on file   Social History Narrative    ** Merged History Encounter **         ** Merged History Encounter **              ALLERGIES: Patient has no known allergies. Review of Systems   Constitutional: Positive for fever. Negative for fatigue. HENT: Negative for sneezing and sore throat. Respiratory: Negative for cough and shortness of breath. Cardiovascular: Negative for chest pain and leg swelling. Gastrointestinal: Negative for abdominal pain, diarrhea, nausea and vomiting. Genitourinary: Negative for difficulty urinating and dysuria. Musculoskeletal: Positive for arthralgias. Negative for back pain, myalgias, neck pain and stiffness. Skin: Negative for color change, itching and rash. Neurological: Negative for tingling, weakness, numbness and headaches. Psychiatric/Behavioral: Negative for agitation and behavioral problems. Vitals:    01/15/21 1819   BP: (!) 150/74   Pulse: 77   Resp: 18   Temp: (!) 100.7 °F (38.2 °C)   SpO2: 98%   Weight: 63.5 kg (140 lb)   Height: 5' 9.29\" (1.76 m)            Physical Exam  Vitals signs and nursing note reviewed. Constitutional:       General: He is not in acute distress. Appearance: Normal appearance. He is normal weight. He is not ill-appearing, toxic-appearing or diaphoretic. HENT:      Head: Normocephalic and atraumatic.       Nose: Nose normal.      Mouth/Throat:      Mouth: Mucous membranes are moist.      Pharynx: Oropharynx is clear.   Eyes:      Extraocular Movements: Extraocular movements intact. Conjunctiva/sclera: Conjunctivae normal.      Pupils: Pupils are equal, round, and reactive to light. Neck:      Musculoskeletal: Normal range of motion and neck supple. Cardiovascular:      Rate and Rhythm: Normal rate and regular rhythm. Pulses: Normal pulses. Heart sounds: Normal heart sounds. Pulmonary:      Effort: Pulmonary effort is normal.      Breath sounds: Normal breath sounds. Abdominal:      General: There is no distension. Palpations: Abdomen is soft. Tenderness: There is no abdominal tenderness. There is no guarding or rebound. Musculoskeletal: Normal range of motion. General: Swelling and tenderness present. No deformity or signs of injury. Right lower leg: No edema. Left lower leg: No edema. Legs:    Skin:     General: Skin is warm and dry. Capillary Refill: Capillary refill takes less than 2 seconds. Neurological:      General: No focal deficit present. Mental Status: He is alert and oriented to person, place, and time. Psychiatric:         Mood and Affect: Mood normal.         Behavior: Behavior normal.          MDM  Number of Diagnoses or Management Options  Diagnosis management comments: 75-year-old gentleman presents as above with right knee pain with cellulitic changes in the prepatellar region. Bedside ultrasound as well as x-ray do not demonstrate significant effusion. I suspect that he is risk for septic joint is low. There is no drainable fluid collection seen on ultrasound. Plan to treat with antibiotics and have him follow-up with primary care and return if needed.        Amount and/or Complexity of Data Reviewed  Clinical lab tests: reviewed  Tests in the radiology section of CPT®: reviewed           Bedside US    Date/Time: 1/15/2021 9:58 PM  Performed by: Maribel Marcial MD  Authorized by: Maribel Marcial MD     Verbal consent obtained: Yes    Given by:  Patient  Performed by: Attending  Type of procedure: Focused soft tissue  Left leg:      Indications:  Swelling and pain    Skin and subcutaneous tissue:  Adequate    Cobblestoning:  Increased    Subcutaneous Collection:  Absent    Interpretation:  Cellulitis    Cellulitis location:  Right knee

## 2021-01-26 ENCOUNTER — TELEPHONE (OUTPATIENT)
Dept: FAMILY MEDICINE CLINIC | Age: 61
End: 2021-01-26

## 2021-01-26 ENCOUNTER — VIRTUAL VISIT (OUTPATIENT)
Dept: FAMILY MEDICINE CLINIC | Age: 61
End: 2021-01-26

## 2021-01-26 DIAGNOSIS — L03.119 CELLULITIS OF LOWER EXTREMITY, UNSPECIFIED LATERALITY: Primary | ICD-10-CM

## 2021-01-26 DIAGNOSIS — T36.95XA ANTIBIOTIC-ASSOCIATED DIARRHEA: ICD-10-CM

## 2021-01-26 DIAGNOSIS — M25.50 ARTHRALGIA, UNSPECIFIED JOINT: ICD-10-CM

## 2021-01-26 DIAGNOSIS — K52.1 ANTIBIOTIC-ASSOCIATED DIARRHEA: ICD-10-CM

## 2021-01-26 PROCEDURE — 99442 PR PHYS/QHP TELEPHONE EVALUATION 11-20 MIN: CPT | Performed by: FAMILY MEDICINE

## 2021-01-26 RX ORDER — ERGOCALCIFEROL 1.25 MG/1
50000 CAPSULE ORAL
Qty: 12 CAP | Refills: 1 | Status: SHIPPED | OUTPATIENT
Start: 2021-01-26 | End: 2021-09-28

## 2021-01-26 NOTE — PROGRESS NOTES
Claudean Lively with [employer] assisted with this phone call today. Prescription coupons texted to pt's phone. Reviewed with pt how to purchase and take OTC Probiotics and he will buy Cuturelle to take 2 capsules daily.  Court Cochran RN

## 2021-01-26 NOTE — TELEPHONE ENCOUNTER
[id number] sent a message to the [employer]. Tc to the pt 2x phone. No answer. The  rang busy. Unable to triage or leave a message. Leora Miller RN        ----- Message from Freddie Márquez sent at 1/26/2021  7:30 AM EST -----  Has appointment this morning with Dr. Cayla Snyder, but please triage - questionable symptoms.

## 2021-01-26 NOTE — TELEPHONE ENCOUNTER
Appointment via the morning appointment line. Patient is complaining of being  Lethargic, getting up from his bed very soar, diarrea, no fever and we reviewed COVID-19 screening which was none except the diarrea. Send note to nurse for triage as well to make sure no urgent matters.

## 2021-01-26 NOTE — PROGRESS NOTES
Virginia Cox (: 1960) is a 61 y.o. male, established patient, here for evaluation of the following chief complaint(s):   Arm Pain (both, two month ago)       ASSESSMENT/PLAN:  1. Cellulitis of lower extremity, unspecified laterality  Resolved. It sounds like he was only taking 150mg TID and so he has Clindamycin left over. Since his infection has resolved would stop abx. 2. Antibiotic-associated diarrhea  Start OTC Probiotics. If it worsens or persists consider C. Diff.  3. Arthralgia, unspecified joint  While on NSAIDs. Labs in past show low calcium and so suspect low Vitamin D. Will start Vitamin D supplement Rx and recheck in a few weeks. Consider labs if not improved. Return in about 2 weeks (around 2021) for follow up F2F in 2 weeks. SUBJECTIVE/OBJECTIVE:  HPI  Arthralgia:  Generalized joint pain for several months. Having severe B shoulder pain at times. Seems worse recently. Is taking Naprosyn BID, given by ER. Knee Cellulitis:  Patient is taking Clindamycin TID but still has significant medication left. He is waking with diarrhea x 4-5 days. His knee infection has resolved. Denies any fever, swelling, joint swelling. Review of Systems   Denies fevers, chills, night sweats, joint swelling, hematochezia. No flowsheet data found. Physical Exam    On this date 21 I have spent 12 minutes reviewing previous notes, test results and face to face (virtual) with the patient discussing the diagnosis and importance of compliance with the treatment plan as well as documenting on the day of the visit. Virginia Cox is being evaluated by a Virtual Visit (phone visit) encounter to address concerns as mentioned above. A caregiver was present when appropriate.  Due to this being a TeleHealth encounter (During [facility] public health emergency), evaluation of the following organ systems was limited: Vitals/Constitutional/EENT/Resp/CV/GI//MS/Neuro/Skin/Heme-Lymph-Imm. Pursuant to the emergency declaration under the [address] [place] [address], [address] [place] [address] and the [employer] Act, this Virtual Visit was conducted with patient's (and/or legal guardian's) consent, to reduce the patient's risk of exposure to COVID-19 and provide necessary medical care. The patient (and/or legal guardian) has also been advised to contact this office for worsening conditions or problems, and seek emergency medical treatment and/or call 911 if deemed necessary. Patient identification was verified at the start of the visit: YES    Services were provided through a phone synchronous discussion virtually to substitute for in-person clinic visit. Patient was located at home and provider was located in office or at home. An electronic signature was used to authenticate this note.   -- Nii Barfield MD

## 2021-01-26 NOTE — PROGRESS NOTES
Coordination of Care  1. Have you been to the ER, urgent care clinic since your last visit? Hospitalized since your last visit? Yes / 1/15/21 / knee pain    2. Have you seen or consulted any other health care providers outside of the [address] [place] [address] since your last visit? Include any pap smears or colon screening. No    Does the patient need refills? YES    Learning Assessment Complete?  yes  Depression Screening complete in the past 12 months? yes     Pt has not vitals signs available

## 2021-02-16 ENCOUNTER — VIRTUAL VISIT (OUTPATIENT)
Dept: FAMILY MEDICINE CLINIC | Age: 61
End: 2021-02-16

## 2021-02-16 DIAGNOSIS — M25.50 ARTHRALGIA, UNSPECIFIED JOINT: Primary | ICD-10-CM

## 2021-02-16 PROCEDURE — 99442 PR PHYS/QHP TELEPHONE EVALUATION 11-20 MIN: CPT | Performed by: FAMILY MEDICINE

## 2021-02-16 NOTE — PROGRESS NOTES
Avs discussed with Zander Watts by Discharge Nurse Jayna Pope LPN. Patient verbalized understanding and has no further questions.  Jayna Pope LPN

## 2021-02-16 NOTE — PROGRESS NOTES
Coordination of Care  1. Have you been to the ER, urgent care clinic since your last visit? Hospitalized since your last visit? No    2. Have you seen or consulted any other health care providers outside of the [address] [place] [address] since your last visit? Include any pap smears or colon screening. No    Does the patient need refills? YES    Learning Assessment Complete? yes  Depression Screening complete in the past 12 months? yes      Patient is not home to check vitals.

## 2021-02-23 DIAGNOSIS — M25.561 RIGHT KNEE PAIN, UNSPECIFIED CHRONICITY: Primary | ICD-10-CM

## 2021-02-23 NOTE — PROGRESS NOTES
Access now specialist to specialist referral from [facility] to ortho for right knee pain   Pt will need a xray before his appt on 3/5/21.  Routing to access now coordinator

## 2021-03-05 ENCOUNTER — TELEPHONE (OUTPATIENT)
Dept: FAMILY MEDICINE CLINIC | Age: 61
End: 2021-03-05

## 2021-03-05 NOTE — TELEPHONE ENCOUNTER
Return in about 2 weeks (around 3/2/2021) for follow up for F2F in 2 weeks for labs for joint pain    Tried reaching the patient for an appointment but No VMB set up at this time.

## 2021-03-05 NOTE — TELEPHONE ENCOUNTER
Patient called back, while we were on the phone with another patient. We called back and again not able to reach patient.

## 2021-03-15 ENCOUNTER — HOSPITAL ENCOUNTER (OUTPATIENT)
Dept: LAB | Age: 61
Discharge: HOME | End: 2021-03-15

## 2021-03-15 ENCOUNTER — OFFICE VISIT (OUTPATIENT)
Dept: FAMILY MEDICINE CLINIC | Age: 61
End: 2021-03-15

## 2021-03-15 VITALS
HEIGHT: 70 IN | TEMPERATURE: 98.9 F | HEART RATE: 58 BPM | WEIGHT: 141 LBS | SYSTOLIC BLOOD PRESSURE: 120 MMHG | DIASTOLIC BLOOD PRESSURE: 66 MMHG | BODY MASS INDEX: 20.19 KG/M2

## 2021-03-15 DIAGNOSIS — M25.541 ARTHRALGIA OF BOTH HANDS: Primary | ICD-10-CM

## 2021-03-15 DIAGNOSIS — M25.542 ARTHRALGIA OF BOTH HANDS: Primary | ICD-10-CM

## 2021-03-15 DIAGNOSIS — M25.60 MORNING STIFFNESS OF JOINTS: ICD-10-CM

## 2021-03-15 DIAGNOSIS — M20.092: ICD-10-CM

## 2021-03-15 PROCEDURE — 99213 OFFICE O/P EST LOW 20 MIN: CPT | Performed by: FAMILY MEDICINE

## 2021-03-15 PROCEDURE — 86038 ANTINUCLEAR ANTIBODIES: CPT

## 2021-03-15 PROCEDURE — 84550 ASSAY OF BLOOD/URIC ACID: CPT

## 2021-03-15 PROCEDURE — 86431 RHEUMATOID FACTOR QUANT: CPT

## 2021-03-15 PROCEDURE — 85652 RBC SED RATE AUTOMATED: CPT

## 2021-03-15 PROCEDURE — 85025 COMPLETE CBC W/AUTO DIFF WBC: CPT

## 2021-03-15 RX ORDER — CELECOXIB 200 MG/1
200 CAPSULE ORAL 2 TIMES DAILY
Qty: 60 CAP | Refills: 4 | Status: SHIPPED | OUTPATIENT
Start: 2021-03-15 | End: 2021-09-28

## 2021-03-15 NOTE — PROGRESS NOTES
Avs discussed with Rory Miki by Discharge Nurse Hali Mckeon LPN. Discussed medication prescribed. Info given and explained to pt on where to get x ray done, copy of financial assistance omer given. Patient verbalized understanding and has no further questions.  AVS printed and given to patient Hali Mckeon LPN

## 2021-03-15 NOTE — PROGRESS NOTES
HISTORY OF PRESENT ILLNESS  Fred Pak is a 61 y.o. male. HPI   Patient states he has been having a lot of joint and muscle pains for the past 6 months. He refers morning stiffness of at least 1 hour, then his joints starts to loosen up. When he sits, he starts getting stiffed too. He also has noticed joint stiffness of the hands. 4 years ago he had a fracture of the left middle finger and it has a flexion deformity. He has seen the orthopedist for right knee pain,  He doesn't need surgery at this point. Review of Systems   Constitutional: Negative for chills, fever and weight loss. Cardiovascular: Negative for chest pain, palpitations and orthopnea. Gastrointestinal: Negative for abdominal pain and heartburn. Genitourinary: Negative for dysuria, frequency and urgency. Musculoskeletal: Positive for back pain, joint pain, myalgias and neck pain. /66 (BP 1 Location: Left upper arm, BP Patient Position: Sitting)   Pulse (!) 58   Temp 98.9 °F (37.2 °C) (Temporal)   Ht 5' 10.16\" (1.782 m)   Wt 141 lb (64 kg)   BMI 20.14 kg/m²     Physical Exam  Constitutional:       General: He is not in acute distress. Appearance: He is not ill-appearing. HENT:      Right Ear: Tympanic membrane normal. There is no impacted cerumen. Left Ear: Tympanic membrane normal. There is no impacted cerumen. Nose: Nose normal. No congestion or rhinorrhea. Cardiovascular:      Rate and Rhythm: Normal rate and regular rhythm. Pulses: Normal pulses. Heart sounds: No murmur. Pulmonary:      Effort: Pulmonary effort is normal.      Breath sounds: Normal breath sounds. No wheezing or rhonchi. Abdominal:      General: Bowel sounds are normal. There is no distension. Palpations: Abdomen is soft. There is no mass. Musculoskeletal:         General: Swelling, tenderness and deformity present.       Comments: Flexion deformity of left 4th finger   Neurological:      Mental Status: He is alert. ASSESSMENT and PLAN  Diagnoses and all orders for this visit:    1. Arthralgia of both hands  -     TAVON BY MULTIPLEX FLOW IA, QL; Future  -     CBC WITH AUTOMATED DIFF; Future  -     RHEUMATOID FACTOR, QT; Future  -     SED RATE (ESR); Future  -     URIC ACID; Future  -     XR HAND RT MIN 3 V; Future  -     XR HAND LT MIN 3 V; Future    2. Angular deformity of phalanx of finger, left  -     XR HAND RT MIN 3 V; Future  -     XR HAND LT MIN 3 V; Future    3. Morning stiffness of joints  -     XR HAND RT MIN 3 V; Future  -     XR HAND LT MIN 3 V; Future    Other orders  -     celecoxib (CELEBREX) 200 mg capsule; Take 1 Cap by mouth two (2) times a day. 61year old male with joint pains, and morning stiffness, hand pains and deformity, we will run a rheumatologic panel, x rays of the hands ordered.    We may need to refer him to rheumatology and hand specialist  Follow up after results are available

## 2021-03-15 NOTE — PROGRESS NOTES
Coordination of Care  1. Have you been to the ER, urgent care clinic since your last visit? Hospitalized since your last visit? No    2. Have you seen or consulted any other health care providers outside of the [address] [place] [address] since your last visit? Include any pap smears or colon screening. No    Does the patient need refills? YES    Learning Assessment Complete?  yes  Depression Screening complete in the past 12 months? yes

## 2021-03-16 ENCOUNTER — HOSPITAL ENCOUNTER (OUTPATIENT)
Dept: GENERAL RADIOLOGY | Age: 61
Discharge: HOME | End: 2021-03-16
Payer: SUBSIDIZED

## 2021-03-16 DIAGNOSIS — M25.60 MORNING STIFFNESS OF JOINTS: ICD-10-CM

## 2021-03-16 DIAGNOSIS — M20.092: ICD-10-CM

## 2021-03-16 DIAGNOSIS — M25.542 ARTHRALGIA OF BOTH HANDS: ICD-10-CM

## 2021-03-16 DIAGNOSIS — M25.541 ARTHRALGIA OF BOTH HANDS: ICD-10-CM

## 2021-03-16 LAB
BASOPHILS # BLD: 0 K/UL (ref 0–0.1)
BASOPHILS NFR BLD: 1 % (ref 0–1)
DIFFERENTIAL METHOD BLD: ABNORMAL
EOSINOPHIL # BLD: 0.2 K/UL (ref 0–0.4)
EOSINOPHIL NFR BLD: 4 % (ref 0–7)
ERYTHROCYTE [DISTWIDTH] IN BLOOD BY AUTOMATED COUNT: 15 % (ref 11.5–14.5)
ERYTHROCYTE [SEDIMENTATION RATE] IN BLOOD: 1 MM/HR (ref 0–20)
HCT VFR BLD AUTO: 43.8 % (ref 36.6–50.3)
HGB BLD-MCNC: 14.6 G/DL (ref 12.1–17)
IMM GRANULOCYTES # BLD AUTO: 0 K/UL (ref 0–0.04)
IMM GRANULOCYTES NFR BLD AUTO: 0 % (ref 0–0.5)
LYMPHOCYTES # BLD: 2 K/UL (ref 0.8–3.5)
LYMPHOCYTES NFR BLD: 39 % (ref 12–49)
MCH RBC QN AUTO: 30.2 PG (ref 26–34)
MCHC RBC AUTO-ENTMCNC: 33.3 G/DL (ref 30–36.5)
MCV RBC AUTO: 90.7 FL (ref 80–99)
MONOCYTES # BLD: 0.4 K/UL (ref 0–1)
MONOCYTES NFR BLD: 7 % (ref 5–13)
NEUTS SEG # BLD: 2.5 K/UL (ref 1.8–8)
NEUTS SEG NFR BLD: 49 % (ref 32–75)
NRBC # BLD: 0 K/UL (ref 0–0.01)
NRBC BLD-RTO: 0 PER 100 WBC
PLATELET # BLD AUTO: 282 K/UL (ref 150–400)
PMV BLD AUTO: 10.7 FL (ref 8.9–12.9)
RBC # BLD AUTO: 4.83 M/UL (ref 4.1–5.7)
RHEUMATOID FACT SERPL-ACNC: <10 IU/ML
URATE SERPL-MCNC: 3.4 MG/DL (ref 3.5–7.2)
WBC # BLD AUTO: 5.1 K/UL (ref 4.1–11.1)

## 2021-03-16 PROCEDURE — 73130 X-RAY EXAM OF HAND: CPT | Performed by: FAMILY MEDICINE

## 2021-03-17 ENCOUNTER — TELEPHONE (OUTPATIENT)
Dept: FAMILY MEDICINE CLINIC | Age: 61
End: 2021-03-17

## 2021-03-17 ENCOUNTER — IMMUNIZATION (OUTPATIENT)
Dept: FAMILY MEDICINE CLINIC | Age: 61
End: 2021-03-17

## 2021-03-17 DIAGNOSIS — Z23 ENCOUNTER FOR IMMUNIZATION: Primary | ICD-10-CM

## 2021-03-17 DIAGNOSIS — M20.092: Primary | ICD-10-CM

## 2021-03-17 LAB — ANA SER QL: NEGATIVE

## 2021-03-17 PROCEDURE — 0001A COVID-19, MRNA, LNP-S, PF, 30MCG/0.3ML DOSE(PFIZER): CPT

## 2021-03-17 PROCEDURE — 91300 COVID-19, MRNA, LNP-S, PF, 30MCG/0.3ML DOSE(PFIZER): CPT

## 2021-03-17 NOTE — PROGRESS NOTES
T/C with the patient. I gave the patient the provider xray results. Patient told me his [facility] card will  on 2021. I told the patient that I will send a message to  to contact him to help with the renewal process. Patient verbalized understanding. Messaged routed to [employer], .

## 2021-03-18 ENCOUNTER — OFFICE VISIT (OUTPATIENT)
Dept: FAMILY MEDICINE CLINIC | Age: 61
End: 2021-03-18

## 2021-03-18 DIAGNOSIS — Z71.89 COUNSELING AND COORDINATION OF CARE: Primary | ICD-10-CM

## 2021-03-18 PROCEDURE — 99080 SPECIAL REPORTS OR FORMS: CPT | Performed by: PHYSICIAN ASSISTANT

## 2021-03-18 NOTE — PROGRESS NOTES
OW met with patient. Patient signed forms. Access Now application completed. OW instructed patient to call AN on or after 4/1/21. Patient said he's not feeling well after Covid 19 vaccine he got yesterday. He claims to have muscle pain and had a stomachache last night.  OW provided [facility] appointment line phone number and told patient to call if symptoms persist.

## 2021-03-19 ENCOUNTER — TELEPHONE (OUTPATIENT)
Dept: FAMILY MEDICINE CLINIC | Age: 61
End: 2021-03-19

## 2021-03-23 ENCOUNTER — VIRTUAL VISIT (OUTPATIENT)
Dept: FAMILY MEDICINE CLINIC | Age: 61
End: 2021-03-23

## 2021-03-23 DIAGNOSIS — G89.29 CHRONIC BILATERAL THORACIC BACK PAIN: ICD-10-CM

## 2021-03-23 DIAGNOSIS — M54.6 CHRONIC BILATERAL THORACIC BACK PAIN: ICD-10-CM

## 2021-03-23 DIAGNOSIS — M79.10 MYALGIA: Primary | ICD-10-CM

## 2021-03-23 PROCEDURE — 99442 PR PHYS/QHP TELEPHONE EVALUATION 11-20 MIN: CPT | Performed by: FAMILY MEDICINE

## 2021-03-23 RX ORDER — TIZANIDINE 4 MG/1
4 TABLET ORAL
Qty: 60 TAB | Refills: 1 | Status: SHIPPED | OUTPATIENT
Start: 2021-03-23 | End: 2021-06-17

## 2021-03-23 NOTE — PROGRESS NOTES
Coordination of Care  1. Have you been to the ER, urgent care clinic since your last visit? Hospitalized since your last visit? No    2. Have you seen or consulted any other health care providers outside of the [address] [place] [address] since your last visit? Include any pap smears or colon screening. No    Does the patient need refills? YES    Learning Assessment Complete? yes  Depression Screening complete in the past 12 months? yes     Per patient no access to vitals sign equipment at home.

## 2021-03-23 NOTE — PROGRESS NOTES
Avs discussed with Denis Tristan by Discharge Nurse Angelic Choudhary LPN. Discussed medication prescribed today, good rx sent to pt via text msg. Nurse explained to pt that medication is as needed and that he will have a f/u scheduled with provider to see how he is doing with the medication. Patient verbalized understanding and has no further questions.  Angelic Choudhary LPN

## 2021-03-23 NOTE — PROGRESS NOTES
Tommie Roy (: 1960) is a 61 y.o. male, established patient, here for evaluation of the following chief complaint(s):   Muscle Pain (f/u) and Results (lab results)       ASSESSMENT/PLAN:  1. Myalgia  Generalized pain, both joint and muscles. Differential include fibromyalgia vs DDD. Will give trial of muscle relaxer since NSAIDs are not providing relief. 2. Chronic bilateral thoracic back pain  The upper back and shoulder pain spells could be Fibromyalgia but also consider cervical DDD. If not improved will pursue F2F exam to evaluate for cervical source. Return in about 4 weeks (around 2021) for follow up for F2F in 4 weeks for follow up. SUBJECTIVE/OBJECTIVE:  HPI  Arthralgia/Muscle Pain:  Has generalized joint and muscle pain which is constant. Has spells of severe pain, shooting pain usually in B shoulders and upper back, that last just a few seconds but then has more pain in those areas the next day. He noted that he did not have these spells for several days after starting Vitamin D supplement but they have slowly recurred. He has not noted any improvement with the Celebrex. Recent labs and xrays reviewed with patient and reveal no inflammatory component. PMHx: CT Thoracic spine 2018: no fracture or disc disease. Review of Systems   Constitutional: Positive for fatigue. Negative for chills, diaphoresis, fever and unexpected weight change. Respiratory: Negative for choking and shortness of breath. Musculoskeletal: Positive for arthralgias, back pain and myalgias. Negative for joint swelling. Neurological: Positive for weakness. Negative for numbness. No flowsheet data found.     Physical Exam    On this date 2021 I have spent 15 minutes reviewing previous notes, test results and face to face (virtual) with the patient discussing the diagnosis and importance of compliance with the treatment plan as well as documenting on the day of the visit. Mick Thomason is being evaluated by a Virtual Visit (phone visit) encounter to address concerns as mentioned above. A caregiver was present when appropriate. Due to this being a TeleHealth encounter (During HYJEM-29 public health emergency), evaluation of the following organ systems was limited: Vitals/Constitutional/EENT/Resp/CV/GI//MS/Neuro/Skin/Heme-Lymph-Imm. Pursuant to the emergency declaration under the [address] [place] [address], [address] [place] [address] authority and the [employer] and [employer] Act, this Virtual Visit was conducted with patient's (and/or legal guardian's) consent, to reduce the patient's risk of exposure to COVID-19 and provide necessary medical care. The patient (and/or legal guardian) has also been advised to contact this office for worsening conditions or problems, and seek emergency medical treatment and/or call 911 if deemed necessary. Patient identification was verified at the start of the visit: YES    Services were provided through a phone synchronous discussion virtually to substitute for in-person clinic visit. Patient was located at home and provider was located in office or at home. An electronic signature was used to authenticate this note.   -- Ruddy García MD

## 2021-03-31 ENCOUNTER — OFFICE VISIT (OUTPATIENT)
Dept: FAMILY MEDICINE CLINIC | Age: 61
End: 2021-03-31

## 2021-03-31 DIAGNOSIS — Z71.89 COUNSELING AND COORDINATION OF CARE: Primary | ICD-10-CM

## 2021-03-31 PROCEDURE — 99080 SPECIAL REPORTS OR FORMS: CPT | Performed by: FAMILY MEDICINE

## 2021-03-31 NOTE — PROGRESS NOTES
Patient asked for an appointment with OW to complete an application that was given to him at time of appointment with Dr. Lorre Rubinstein. Patient could not explain what kind of application was given to him by the nurse. Patient was informed OW will be at [facility] Following Monday. Patient stated he has transportation problems and will let OW know if he can come.

## 2021-04-07 ENCOUNTER — IMMUNIZATION (OUTPATIENT)
Dept: FAMILY MEDICINE CLINIC | Age: 61
End: 2021-04-07

## 2021-04-07 DIAGNOSIS — Z23 ENCOUNTER FOR IMMUNIZATION: Primary | ICD-10-CM

## 2021-04-07 PROCEDURE — 0002A COVID-19, MRNA, LNP-S, PF, 30MCG/0.3ML DOSE(PFIZER): CPT

## 2021-04-07 PROCEDURE — 91300 COVID-19, MRNA, LNP-S, PF, 30MCG/0.3ML DOSE(PFIZER): CPT

## 2021-04-13 ENCOUNTER — TELEPHONE (OUTPATIENT)
Dept: FAMILY MEDICINE CLINIC | Age: 61
End: 2021-04-13

## 2021-04-13 NOTE — TELEPHONE ENCOUNTER
OW called patient to confirm information. Patient confirmed he has not filed taxes and noone else claims him as dependent. Patient asked for Appt. to complete CareCard application. An appt was made for 4/15/21 at 1:45pm. Malcolm Lindsay Screening questions \"NO\".

## 2021-04-15 ENCOUNTER — OFFICE VISIT (OUTPATIENT)
Dept: FAMILY MEDICINE CLINIC | Age: 61
End: 2021-04-15

## 2021-04-15 DIAGNOSIS — Z71.89 COUNSELING AND COORDINATION OF CARE: Primary | ICD-10-CM

## 2021-04-15 PROCEDURE — 99080 SPECIAL REPORTS OR FORMS: CPT | Performed by: PHYSICIAN ASSISTANT

## 2021-04-15 NOTE — PROGRESS NOTES
OW met with patient and assisted with Care Card application. OW provided stamped self addressed envelope for patient to include Bank statement and mail application to [employer]. Self attestation was completed and included to the application. OW explained the process to patient and he verbalized understanding.

## 2021-04-20 ENCOUNTER — OFFICE VISIT (OUTPATIENT)
Dept: FAMILY MEDICINE CLINIC | Age: 61
End: 2021-04-20

## 2021-04-20 DIAGNOSIS — Z71.89 COUNSELING AND COORDINATION OF CARE: Primary | ICD-10-CM

## 2021-04-20 PROCEDURE — 99080 SPECIAL REPORTS OR FORMS: CPT | Performed by: PHYSICIAN ASSISTANT

## 2021-04-20 NOTE — PROGRESS NOTES
V.V. Patient called OW to ask some questions about the Care Card application he was about to mail today. OW explained the process to patient and patient verbalized understanding.

## 2021-04-23 ENCOUNTER — TELEPHONE (OUTPATIENT)
Dept: FAMILY MEDICINE CLINIC | Age: 61
End: 2021-04-23

## 2021-04-23 NOTE — TELEPHONE ENCOUNTER
OW called patient to let him know he can call AN to schedule his appointment. OW provided AN phone number and business hours.  Patient verbalized understanding.

## 2021-05-10 ENCOUNTER — APPOINTMENT (OUTPATIENT)
Dept: GENERAL RADIOLOGY | Age: 61
End: 2021-05-10
Attending: EMERGENCY MEDICINE

## 2021-05-10 ENCOUNTER — HOSPITAL ENCOUNTER (EMERGENCY)
Age: 61
Discharge: HOME | End: 2021-05-10
Attending: EMERGENCY MEDICINE

## 2021-05-10 VITALS
HEART RATE: 75 BPM | OXYGEN SATURATION: 100 % | DIASTOLIC BLOOD PRESSURE: 85 MMHG | HEIGHT: 70 IN | BODY MASS INDEX: 19.16 KG/M2 | WEIGHT: 133.82 LBS | RESPIRATION RATE: 18 BRPM | TEMPERATURE: 97.5 F | SYSTOLIC BLOOD PRESSURE: 158 MMHG

## 2021-05-10 DIAGNOSIS — M25.511 PAIN IN JOINT OF RIGHT SHOULDER: Primary | ICD-10-CM

## 2021-05-10 DIAGNOSIS — R20.0 NUMBNESS AND TINGLING OF RIGHT ARM: ICD-10-CM

## 2021-05-10 DIAGNOSIS — R20.2 NUMBNESS AND TINGLING OF RIGHT ARM: ICD-10-CM

## 2021-05-10 LAB
ALBUMIN SERPL-MCNC: 4 G/DL (ref 3.5–5)
ALBUMIN/GLOB SERPL: 1.3 {RATIO} (ref 1.1–2.2)
ALP SERPL-CCNC: 85 U/L (ref 45–117)
ALT SERPL-CCNC: 30 U/L (ref 12–78)
ANION GAP SERPL CALC-SCNC: 3 MMOL/L (ref 5–15)
APPEARANCE UR: CLEAR
AST SERPL-CCNC: 25 U/L (ref 15–37)
BACTERIA URNS QL MICRO: NEGATIVE /HPF
BASOPHILS # BLD: 0 K/UL (ref 0–0.1)
BASOPHILS NFR BLD: 0 % (ref 0–1)
BILIRUB SERPL-MCNC: 0.8 MG/DL (ref 0.2–1)
BILIRUB UR QL: NEGATIVE
BUN SERPL-MCNC: 7 MG/DL (ref 6–20)
BUN/CREAT SERPL: 8 (ref 12–20)
CALCIUM SERPL-MCNC: 8.7 MG/DL (ref 8.5–10.1)
CHLORIDE SERPL-SCNC: 105 MMOL/L (ref 97–108)
CO2 SERPL-SCNC: 31 MMOL/L (ref 21–32)
COLOR UR: NORMAL
CREAT SERPL-MCNC: 0.84 MG/DL (ref 0.7–1.3)
DIFFERENTIAL METHOD BLD: NORMAL
EOSINOPHIL # BLD: 0.2 K/UL (ref 0–0.4)
EOSINOPHIL NFR BLD: 3 % (ref 0–7)
EPITH CASTS URNS QL MICRO: NORMAL /LPF
ERYTHROCYTE [DISTWIDTH] IN BLOOD BY AUTOMATED COUNT: 13.8 % (ref 11.5–14.5)
GLOBULIN SER CALC-MCNC: 3.2 G/DL (ref 2–4)
GLUCOSE SERPL-MCNC: 101 MG/DL (ref 65–100)
GLUCOSE UR STRIP.AUTO-MCNC: NEGATIVE MG/DL
HCT VFR BLD AUTO: 41.7 % (ref 36.6–50.3)
HGB BLD-MCNC: 14.5 G/DL (ref 12.1–17)
HGB UR QL STRIP: NEGATIVE
IMM GRANULOCYTES # BLD AUTO: 0 K/UL (ref 0–0.04)
IMM GRANULOCYTES NFR BLD AUTO: 0 % (ref 0–0.5)
KETONES UR QL STRIP.AUTO: NEGATIVE MG/DL
LEUKOCYTE ESTERASE UR QL STRIP.AUTO: NEGATIVE
LIPASE SERPL-CCNC: 98 U/L (ref 73–393)
LYMPHOCYTES # BLD: 1.7 K/UL (ref 0.8–3.5)
LYMPHOCYTES NFR BLD: 33 % (ref 12–49)
MCH RBC QN AUTO: 29.7 PG (ref 26–34)
MCHC RBC AUTO-ENTMCNC: 34.8 G/DL (ref 30–36.5)
MCV RBC AUTO: 85.5 FL (ref 80–99)
MONOCYTES # BLD: 0.4 K/UL (ref 0–1)
MONOCYTES NFR BLD: 7 % (ref 5–13)
NEUTS SEG # BLD: 2.9 K/UL (ref 1.8–8)
NEUTS SEG NFR BLD: 57 % (ref 32–75)
NITRITE UR QL STRIP.AUTO: NEGATIVE
NRBC # BLD: 0 K/UL (ref 0–0.01)
NRBC BLD-RTO: 0 PER 100 WBC
PH UR STRIP: 7 [PH] (ref 5–8)
PLATELET # BLD AUTO: 236 K/UL (ref 150–400)
PMV BLD AUTO: 9.9 FL (ref 8.9–12.9)
POTASSIUM SERPL-SCNC: 3.8 MMOL/L (ref 3.5–5.1)
PROT SERPL-MCNC: 7.2 G/DL (ref 6.4–8.2)
PROT UR STRIP-MCNC: NEGATIVE MG/DL
RBC # BLD AUTO: 4.88 M/UL (ref 4.1–5.7)
RBC #/AREA URNS HPF: NORMAL /HPF (ref 0–5)
SODIUM SERPL-SCNC: 139 MMOL/L (ref 136–145)
SP GR UR REFRACTOMETRY: 1 (ref 1–1.03)
UROBILINOGEN UR QL STRIP.AUTO: 0.2 EU/DL (ref 0.2–1)
WBC # BLD AUTO: 5.2 K/UL (ref 4.1–11.1)
WBC URNS QL MICRO: NORMAL /HPF (ref 0–4)

## 2021-05-10 PROCEDURE — 96374 THER/PROPH/DIAG INJ IV PUSH: CPT

## 2021-05-10 PROCEDURE — 85025 COMPLETE CBC W/AUTO DIFF WBC: CPT

## 2021-05-10 PROCEDURE — 83690 ASSAY OF LIPASE: CPT

## 2021-05-10 PROCEDURE — 80053 COMPREHEN METABOLIC PANEL: CPT

## 2021-05-10 PROCEDURE — 36415 COLL VENOUS BLD VENIPUNCTURE: CPT

## 2021-05-10 PROCEDURE — 99282 EMERGENCY DEPT VISIT SF MDM: CPT

## 2021-05-10 PROCEDURE — 74011250636 HC RX REV CODE- 250/636: Performed by: EMERGENCY MEDICINE

## 2021-05-10 PROCEDURE — 81003 URINALYSIS AUTO W/O SCOPE: CPT

## 2021-05-10 PROCEDURE — 74011250637 HC RX REV CODE- 250/637: Performed by: EMERGENCY MEDICINE

## 2021-05-10 PROCEDURE — 73030 X-RAY EXAM OF SHOULDER: CPT

## 2021-05-10 PROCEDURE — 72050 X-RAY EXAM NECK SPINE 4/5VWS: CPT

## 2021-05-10 RX ORDER — FAMOTIDINE 20 MG/1
20 TABLET, FILM COATED ORAL 2 TIMES DAILY
Qty: 20 TAB | Refills: 0 | Status: SHIPPED | OUTPATIENT
Start: 2021-05-10 | End: 2021-05-20

## 2021-05-10 RX ORDER — FAMOTIDINE 20 MG/1
20 TABLET, FILM COATED ORAL
Status: COMPLETED | OUTPATIENT
Start: 2021-05-10 | End: 2021-05-10

## 2021-05-10 RX ORDER — KETOROLAC TROMETHAMINE 30 MG/ML
15 INJECTION, SOLUTION INTRAMUSCULAR; INTRAVENOUS
Status: COMPLETED | OUTPATIENT
Start: 2021-05-10 | End: 2021-05-10

## 2021-05-10 RX ADMIN — KETOROLAC TROMETHAMINE 15 MG: 30 INJECTION, SOLUTION INTRAMUSCULAR; INTRAVENOUS at 14:51

## 2021-05-10 RX ADMIN — FAMOTIDINE 20 MG: 20 TABLET, FILM COATED ORAL at 14:51

## 2021-05-10 NOTE — ED TRIAGE NOTES
Vietnamese interpretor used in triage. Patient arrives with complaint of right arm pain and numbness. States has had generalized \"joint pain\" for several months. States pain is generalized \"in all joints. \"     States sensation is equal in all extremities. Denies n/v, SOB, change in speech, vision, change in ambulation, extremity weakness, chest pain. Reports hx of gastritis. States wanting blood work to test kidney function and prostate.

## 2021-05-10 NOTE — ED PROVIDER NOTES
HPI patient is a 70-year-old  male who presents to the ED with generalized body aches particularly in the right shoulder and neck with radiculopathy to the right arm. He has a past medical history of benign prostatic hypertrophy. He states he has been evaluated at the [facility]. He denies any falls, new injuries or blunt trauma. He states he has intermittent numbness and tingling in the right hand. Denies fever, cold symptoms, headache, neck pain, visual changes, focal weakness or rash. Denies any difficulty breathing, difficulty swallowing, SOB or chest pain. Denies any nausea, vomiting or diarrhea. Pt. Reports that he has not had any medications today prior to arrival.  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.         Past Medical History:   Diagnosis Date    BPH (benign prostatic hyperplasia)        Past Surgical History:   Procedure Laterality Date    UPPER GI ENDOSCOPY,BIOPSY  7/15/2020              Family History:   Problem Relation Age of Onset    Parkinsonism Mother     Arthritis-osteo Father     Diabetes Brother     Heart Disease Brother     Heart Surgery Brother        Social History     Socioeconomic History    Marital status: SINGLE     Spouse name: Not on file    Number of children: Not on file    Years of education: Not on file    Highest education level: Not on file   Occupational History    Not on file   Social Needs    Financial resource strain: Not on file    Food insecurity     Worry: Not on file     Inability: Not on file    Transportation needs     Medical: Not on file     Non-medical: Not on file   Tobacco Use    Smoking status: Never Smoker    Smokeless tobacco: Never Used   Substance and Sexual Activity    Alcohol use: No     Alcohol/week: 0.0 standard drinks    Drug use: No    Sexual activity: Yes     Partners: Female   Lifestyle    Physical activity     Days per week: Not on file     Minutes per session: Not on file    Stress: Not on file   Relationships    Social connections Talks on phone: Not on file     Gets together: Not on file     Attends Orthodox service: Not on file     Active member of club or organization: Not on file     Attends meetings of clubs or organizations: Not on file     Relationship status: Not on file    Intimate partner violence     Fear of current or ex partner: Not on file     Emotionally abused: Not on file     Physically abused: Not on file     Forced sexual activity: Not on file   Other Topics Concern    Not on file   Social History Narrative    ** Merged History Encounter **         ** Merged History Encounter **              ALLERGIES: Patient has no known allergies. Review of Systems   Constitutional: Negative for activity change, appetite change, fever and unexpected weight change. HENT: Negative for congestion, rhinorrhea, sore throat, trouble swallowing and voice change. Eyes: Negative for visual disturbance. Respiratory: Negative for cough and shortness of breath. Cardiovascular: Negative for chest pain, palpitations and leg swelling. Gastrointestinal: Negative for abdominal pain, diarrhea, nausea and vomiting. Genitourinary: Negative for difficulty urinating, dysuria and flank pain. Musculoskeletal: Positive for arthralgias and neck pain. Negative for back pain and myalgias. Neurological: Positive for numbness. Negative for dizziness and headaches. All other systems reviewed and are negative. Vitals:    05/10/21 1355   BP: (!) 158/85   Pulse: 75   Resp: 18   Temp: 97.5 °F (36.4 °C)   SpO2: 100%   Weight: 60.7 kg (133 lb 13.1 oz)   Height: 5' 10\" (1.778 m)            Physical Exam  Vitals signs and nursing note reviewed. Constitutional:       General: He is not in acute distress. Appearance: Normal appearance. He is normal weight. He is not ill-appearing, toxic-appearing or diaphoretic. 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       Right Ear: Tympanic membrane normal.      Left Ear: Tympanic membrane normal.      Nose: Nose normal.      Mouth/Throat:      Mouth: Mucous membranes are moist.      Pharynx: No posterior oropharyngeal erythema. Neck:      Musculoskeletal: Normal range of motion and neck supple. Muscular tenderness present. Comments: Reports some neck soreness with active range of motion of the head neck; Skin integrity is intact. There is no obvious soft tissue or bony deformity; no rash, bruising or erythema. Good neurovascular sensation. No apparent tendon or nerve injury. Cardiovascular:      Rate and Rhythm: Normal rate and regular rhythm. Pulmonary:      Effort: Pulmonary effort is normal.      Breath sounds: Normal breath sounds. Abdominal:      General: Bowel sounds are normal.      Palpations: Abdomen is soft. Tenderness: There is no abdominal tenderness. There is no guarding or rebound. Hernia: No hernia is present. Musculoskeletal: Normal range of motion. General: Tenderness present. Right lower leg: No edema. Left lower leg: No edema. Comments: Reports right shoulder pain with radiculopathy to the right arm and hand; Skin integrity is intact. There is no obvious deformity; no bruising no swelling no erythema or rash noted. Good neurovascular sensation. No apparent tendon or nerve injury. Skin:     General: Skin is warm and dry. Findings: No rash. Neurological:      Mental Status: He is alert and oriented to person, place, and time. MDM       Procedures        Xr Spine Cerv 4 Or 5 V    Result Date: 5/10/2021  No acute fracture or subluxation. Xr Shoulder Rt Ap/lat Min 2 V    Result Date: 5/10/2021  No acute abnormality.     Labs Reviewed   METABOLIC PANEL, COMPREHENSIVE - Abnormal; Notable for the following components:       Result Value    Anion gap 3 (*)     Glucose 101 (*)     BUN/Creatinine ratio 8 (*)     All other components within normal limits   CBC WITH AUTOMATED DIFF   LIPASE   URINALYSIS W/ RFLX MICROSCOPIC     3:16 PM  Patient's results and plan of care have been reviewed with him. Patient has verbally conveyed his understanding and agreement of his signs, symptoms, diagnosis, treatment and prognosis and additionally agrees to follow up as recommended or return to the Emergency Room should his condition change prior to follow-up. Discharge instructions have also been provided to the patient with some educational information regarding his diagnosis as well a list of reasons why he would want to return to the ER prior to his follow-up appointment should his condition change. Zee Scott, MIR

## 2021-05-11 ENCOUNTER — VIRTUAL VISIT (OUTPATIENT)
Dept: FAMILY MEDICINE CLINIC | Age: 61
End: 2021-05-11

## 2021-05-11 DIAGNOSIS — M50.30 DDD (DEGENERATIVE DISC DISEASE), CERVICAL: Primary | ICD-10-CM

## 2021-05-11 DIAGNOSIS — R10.30 LOWER ABDOMINAL PAIN: ICD-10-CM

## 2021-05-11 DIAGNOSIS — M79.10 MYALGIA: ICD-10-CM

## 2021-05-11 PROCEDURE — 99442 PR PHYS/QHP TELEPHONE EVALUATION 11-20 MIN: CPT | Performed by: FAMILY MEDICINE

## 2021-05-11 NOTE — PROGRESS NOTES
Alessio Rubio (: 1960) is a 61 y.o. male, established patient, here for evaluation of the following chief complaint(s):   Hospital Follow Up (ED visit f/u)       ASSESSMENT/PLAN:  1. DDD (degenerative disc disease), cervical  This is likely causing his neck and shoulder sx. He states it is now quiescent since having a Toradol shot in ER. Consider PT  2. Lower abdominal pain  Prior CT showed bladder distention. Consider retention. Recent U/A was clear. Will consider revisiting Urology or trial of Flomax. 3. Myalgia  Mostly in B calves at this time. Consider spinal stenosis, neuropathy, or PVD. Will have patient come in for F2F exam.    Return for follow up for F2F next available for ED follow up. .    SUBJECTIVE/OBJECTIVE:  HPI ED follow up. Generalized Body Pain:  Patient went to ER due to persisting generalized aches. Pain is a stiffness that is much worse when first getting up in AM or after sitting for a while. Most pronounce in B calves. Neck pain/B shoulder pain:  Had more pain in neck and shoulder with numbness into Rt shoulder so went to ER. Had Toradol shot and pain improved. Xray showed Cervical DDD. Abdominal Pain:  Has mild lower abdominal pain for some time, from umbilicus down. It got much worse yesterday and so went to the ER. It is worse in morning and improves after voiding. Saw Urology last year for chronic prostatitis. Was given medication but stopped it because someone told him it could cause kidney problems. Review of Systems   Constitutional: Negative for appetite change, chills, diaphoresis and fever. Gastrointestinal: Positive for abdominal pain. Negative for abdominal distention, blood in stool, constipation, diarrhea and nausea. Genitourinary: Negative for difficulty urinating, dysuria, flank pain, frequency, hematuria and urgency. Musculoskeletal: Positive for arthralgias, myalgias and neck pain. Negative for joint swelling.         No flowsheet data found. Physical Exam    On this date 05/11/2021 I have spent 19 minutes reviewing previous notes, test results and face to face (virtual) with the patient discussing the diagnosis and importance of compliance with the treatment plan as well as documenting on the day of the visit. Andres Yin is being evaluated by a Virtual Visit (phone visit) encounter to address concerns as mentioned above. A caregiver was present when appropriate. Due to this being a TeleHealth encounter (During JJA-01 public health emergency), evaluation of the following organ systems was limited: Vitals/Constitutional/EENT/Resp/CV/GI//MS/Neuro/Skin/Heme-Lymph-Imm. Pursuant to the emergency declaration under the [address] [place] [address] authority and the [employer] and [employer] Act, this Virtual Visit was conducted with patient's (and/or legal guardian's) consent, to reduce the patient's risk of exposure to COVID-19 and provide necessary medical care. The patient (and/or legal guardian) has also been advised to contact this office for worsening conditions or problems, and seek emergency medical treatment and/or call 911 if deemed necessary. Patient identification was verified at the start of the visit: YES    Services were provided through a phone synchronous discussion virtually to substitute for in-person clinic visit. Patient was located at home and provider was located in office or at home. An electronic signature was used to authenticate this note.   -- Momo Loza MD

## 2021-05-11 NOTE — PROGRESS NOTES
Per patient no access to vital sign equipment at home. Patient states he is not taking any medications at this time.

## 2021-05-13 ENCOUNTER — TELEPHONE (OUTPATIENT)
Dept: FAMILY MEDICINE CLINIC | Age: 61
End: 2021-05-13

## 2021-05-14 ENCOUNTER — HOSPITAL ENCOUNTER (OUTPATIENT)
Dept: LAB | Age: 61
Discharge: HOME | End: 2021-05-14

## 2021-05-14 ENCOUNTER — OFFICE VISIT (OUTPATIENT)
Dept: FAMILY MEDICINE CLINIC | Age: 61
End: 2021-05-14

## 2021-05-14 VITALS
HEIGHT: 69 IN | SYSTOLIC BLOOD PRESSURE: 106 MMHG | OXYGEN SATURATION: 97 % | HEART RATE: 59 BPM | BODY MASS INDEX: 20.35 KG/M2 | TEMPERATURE: 97.9 F | WEIGHT: 137.4 LBS | DIASTOLIC BLOOD PRESSURE: 66 MMHG

## 2021-05-14 DIAGNOSIS — M79.10 MYALGIA: ICD-10-CM

## 2021-05-14 DIAGNOSIS — R33.8 BENIGN PROSTATIC HYPERPLASIA WITH URINARY RETENTION: ICD-10-CM

## 2021-05-14 DIAGNOSIS — M79.10 MYALGIA: Primary | ICD-10-CM

## 2021-05-14 DIAGNOSIS — N40.1 BENIGN PROSTATIC HYPERPLASIA WITH URINARY RETENTION: ICD-10-CM

## 2021-05-14 PROCEDURE — 82607 VITAMIN B-12: CPT

## 2021-05-14 PROCEDURE — 82306 VITAMIN D 25 HYDROXY: CPT

## 2021-05-14 PROCEDURE — 99214 OFFICE O/P EST MOD 30 MIN: CPT | Performed by: FAMILY MEDICINE

## 2021-05-14 RX ORDER — DULOXETIN HYDROCHLORIDE 30 MG/1
30 CAPSULE, DELAYED RELEASE ORAL DAILY
Qty: 30 CAP | Refills: 3 | Status: SHIPPED | OUTPATIENT
Start: 2021-05-14 | End: 2021-06-17

## 2021-05-14 RX ORDER — TAMSULOSIN HYDROCHLORIDE 0.4 MG/1
0.4 CAPSULE ORAL
Qty: 30 CAP | Refills: 1 | Status: SHIPPED
Start: 2021-05-14 | End: 2021-06-17

## 2021-05-14 NOTE — PROGRESS NOTES
I have copied the provider's check out note here and have reviewed these items with the patient today: Check-out Note: follow up for F2F in 6 weeks for follow up   Medication eRxd   Patient instructions   Labs ordered   I reviewed the patients medications with him and instructed him to pick them up from his pharmacy. I have printed AVS and reviewed it with patient today. Patient verbalized understanding.  Calista Diehl RN

## 2021-05-14 NOTE — PROGRESS NOTES
Coordination of Care  1. Have you been to the ER, urgent care clinic since your last visit? Hospitalized since your last visit? No    2. Have you seen or consulted any other health care providers outside of the [address] [place] [address] since your last visit? Include any pap smears or colon screening. No    Does the patient need refills? NO    Learning Assessment Complete?  yes  Depression Screening complete in the past 12 months? yes

## 2021-05-14 NOTE — PROGRESS NOTES
Jax Pendleton (: 1960) is a 61 y.o. male, established patient, here for evaluation of the following chief complaint(s):  Follow-up (From VV 2021- DDD cervical- Lower abdominal pain)       ASSESSMENT/PLAN:  1. Myalgia  Generalized without typical presentation of spinal stenosis. His pain is worse after rest.  He has diffuse DDD of spine but this does not necessary correlate with any focal sx. His recent labs for autoimmune disease were negative. Will give trial of Cymbalta for suspected Fibromyalgia. -     VITAMIN B12; Future  -     VITAMIN D, 25 HYDROXY; Future  2. Benign prostatic hyperplasia with urinary retention  NL exam and BPH with bladder distention noted on prior CT. Will give trial of Flomax. Follow-up and Dispositions    · Return in about 6 weeks (around 2021) for follow up for F2F in 6 weeks for follow up. SUBJECTIVE:  HPI  Generalized Body Pain:  Patient went to ER due to persisting generalized aches. Pain is a muscle ache/stiffness worse in AM and after sitting for a while. It affects cervical spine along the spine, B upper arms around shoulder, B anterior thigh, B calfs, Lt lumbar muscles. Severe pain into his Rt shoulder and arm has resolved with Toradol given in ER. Labs 3/2021: NL ESR, RF, TAVON. Abdominal Discomfort:  Has mild lower abdominal pain for some time, from umbilicus down. It is worse in morning and improves after voiding. Saw Urology in past for chronic prostatitis. Was given medication but stopped it because someone told him it could cause kidney problems. SHx:  He can walk over a mild to the Informous without weakness or worsening leg pain. Review of Systems   Constitutional: Negative for appetite change, chills, diaphoresis and fever. Gastrointestinal: Positive for abdominal pain. Negative for abdominal distention, blood in stool, constipation, diarrhea and nausea.    Genitourinary: Negative for difficulty urinating, dysuria, flank pain, frequency, hematuria and urgency. Musculoskeletal: Positive for arthralgias, myalgias and neck pain. Negative for joint swelling. OBJECTIVE:  Blood pressure 106/66, pulse (!) 59, temperature 97.9 °F (36.6 °C), temperature source Temporal, height 5' 8.5\" (1.74 m), weight 137 lb 6.4 oz (62.3 kg), SpO2 97 %. Physical Exam  CONSTITUTIONAL:  Well developed. No apparent distress. PSYCHIATRIC: Oriented to time, place, person & situation. Appropriate mood and affect. NECK:  Normal inspection, normal palpation without any lymphadenopathy, masses, or thyromegaly  CARDIOVASCULAR:  Regular rate and rhythm. Normal S1, S2. No extra sounds. RESPIRATORY:  Normal effort. Normal ascultation without wheezing. ABDOMEN:  Normal bowel sounds. Abdomen soft, non tender. No hepatosplenomegaly or masses. VASCULAR:  Normal Carotid pulses without bruits. Normal Posterior Tibialis pulses. No abdominal or femoral bruits. EXTREMITIES:  No edema. MUSCULOSKELETAL:  No joint swelling, warmth, erythema in hands or wrists. Shoulders have full ROM without pain. Pain on palpation over midline cervical spine and paraspinal muscles, B muscles of the upper arms, B thighs, B calves. No results found for this visit on 05/14/21. An electronic signature was used to authenticate this note.   -- Deann Etienne MD

## 2021-05-15 LAB
25(OH)D3 SERPL-MCNC: 34.9 NG/ML (ref 30–100)
VIT B12 SERPL-MCNC: 912 PG/ML (ref 193–986)

## 2021-05-19 ENCOUNTER — TELEPHONE (OUTPATIENT)
Dept: FAMILY MEDICINE CLINIC | Age: 61
End: 2021-05-19

## 2021-05-19 NOTE — TELEPHONE ENCOUNTER
Hold duloxetine for now. Take only Tamsulosin with evening meal to see if he can tolerate just one medication without diarrhea.

## 2021-05-19 NOTE — TELEPHONE ENCOUNTER
Patient picked up his meds yesterday and took two pills and this morning he got diarrhea this morning. Please triage.

## 2021-05-19 NOTE — TELEPHONE ENCOUNTER
Tc to the pt Jessica Sade was the [facility] [place] . The pt was given the providers message to hold the Cymbalta and take only the Flomax with the evening meal and see if this stops the diarrhea. The pt was told to call this nurse back in a couple of days and let me know if the diarrhea has stopped or not. the pt verbalized understanding.  Caryl Lemus, RN

## 2021-05-19 NOTE — TELEPHONE ENCOUNTER
Tc to the pt to triage. The front office sent a message. The pt was stating he had gotten 2 prescriptions for the provider at his appt. And he was told to take the one for the prostate at night, but he took both the medicines together in the day yesterday and this morning has had diarrhea 4x since too. The pt also stated he has taken both of these medication's in the past and took one in am and one in pm and it gave im diarrhea and the 2 medicines were stopped. This message was sent to the provider for review.  Irma Chiang RN

## 2021-06-02 NOTE — PROGRESS NOTES
The pt was called 2x. Colten Vasquez was the [facility] [place] . The pt did not answer. A letter was mailed to the pt.  Gab Bhandari RN

## 2021-06-09 ENCOUNTER — TELEPHONE (OUTPATIENT)
Dept: FAMILY MEDICINE CLINIC | Age: 61
End: 2021-06-09

## 2021-06-09 NOTE — TELEPHONE ENCOUNTER
V.V. Patient called asking information about AN appointment. OW provided information. Patient was confused and thought his appointment was at the [facility] clinic. OW checked on patient's chart and provided address and specialty patient would be seeing today. (Ortho), not Dermatologist, as patient said.

## 2021-06-15 ENCOUNTER — OFFICE VISIT (OUTPATIENT)
Dept: FAMILY MEDICINE CLINIC | Age: 61
End: 2021-06-15

## 2021-06-15 DIAGNOSIS — Z71.89 COUNSELING AND COORDINATION OF CARE: Primary | ICD-10-CM

## 2021-06-15 PROCEDURE — 99080 SPECIAL REPORTS OR FORMS: CPT | Performed by: PHYSICIAN ASSISTANT

## 2021-06-15 NOTE — PROGRESS NOTES
CRISTIANO DELEON called patient back after receiving a vm from him where he asked for assistance with medical bills. Patient said he was busy at the time and could not complete screening. Patient said he'd call OW later.

## 2021-06-17 ENCOUNTER — TELEPHONE (OUTPATIENT)
Dept: FAMILY MEDICINE CLINIC | Age: 61
End: 2021-06-17

## 2021-06-17 ENCOUNTER — VIRTUAL VISIT (OUTPATIENT)
Dept: FAMILY MEDICINE CLINIC | Age: 61
End: 2021-06-17

## 2021-06-17 DIAGNOSIS — H53.9 VISION CHANGES: Primary | ICD-10-CM

## 2021-06-17 DIAGNOSIS — R53.1 WEAKNESS: ICD-10-CM

## 2021-06-17 DIAGNOSIS — R42 DIZZINESS: ICD-10-CM

## 2021-06-17 PROCEDURE — 99442 PR PHYS/QHP TELEPHONE EVALUATION 11-20 MIN: CPT | Performed by: PHYSICIAN ASSISTANT

## 2021-06-17 NOTE — PROGRESS NOTES
Assessment/Plan:    Diagnoses and all orders for this visit:    1. Vision changes  -     REFERRAL TO OPHTHALMOLOGY    2. Dizziness    3. Weakness    Stop flomax (his only current med)  Look for next available f2f appt  Go to ER if sxs worsen (he declines today)  Access now to eye for vision changes     Follow-up and Dispositions    · Return in about 1 week (around 2021) for f2f please . MARK Torres expressed understanding of this plan. An AVS was printed and given to the patient.      ----------------------------------------------------------------------    Chief Complaint   Patient presents with    Blurred Vision     pt c/o blurred vision x 1 month. Cindy Hayward, according to Dr. Gill Skelton on  notes he wants to see the patient again for f2f f/up    Blood Pressure Check     pt states today 21 he was feeling dizzy and he thinks is the blood pressure       History of Present Illness:  Pt called and consented to virtual telephone visit, he declined video  He was identified by name and     He is being seen today for several problems:  1. Vision changes- this is a chronic problem but lately has become more pronounced. He states that he has blurry vision and his usual readers aren't helping. Not clear if/when he has had a professional eye exam.   2. Pt felt weak and dizzy today. He thought it might be his bp (not able to check it). Interestingly, at his last office visit a month ago, he had very labile readings- both low and elevated. At that visit, he was put on flomax for his BPH. He did not get the cymbalta due to \"not having money for it\". Currently the ONLY med he states that he is taking is the flomax. I am having him stop this to see if his sxs improve. He ate a banana and garlic and this also seemed to help this morning. He refuses ER eval. He states that the bills and the wait are the reasons he won't go. He wants to be seen in clinic instead.  He promised me that if his sxs worsen that he will go. Past Medical History:   Diagnosis Date    BPH (benign prostatic hyperplasia)        Current Outpatient Medications   Medication Sig Dispense Refill    celecoxib (CELEBREX) 200 mg capsule Take 1 Cap by mouth two (2) times a day. (Patient not taking: Reported on 6/17/2021) 60 Cap 4    ergocalciferol (ERGOCALCIFEROL) 1,250 mcg (50,000 unit) capsule Take 1 Cap by mouth every seven (7) days. (Patient not taking: Reported on 6/17/2021) 12 Cap 1       No Known Allergies    Social History     Tobacco Use    Smoking status: Never Smoker    Smokeless tobacco: Never Used   Substance Use Topics    Alcohol use: No     Alcohol/week: 0.0 standard drinks    Drug use: No       Family History   Problem Relation Age of Onset    Parkinsonism Mother     Arthritis-osteo Father     Diabetes Brother     Heart Disease Brother     Heart Surgery Brother        Physical Exam:     There were no vitals taken for this visit. Alert and oriented, voice seems weak  A&Ox3  WDWN NAD  Respirations normal and non labored  Lab Results   Component Value Date/Time    Hemoglobin A1c 5.5 10/29/2020 10:30 AM     Lab Results   Component Value Date/Time    Sodium 139 05/10/2021 02:21 PM    Potassium 3.8 05/10/2021 02:21 PM    Chloride 105 05/10/2021 02:21 PM    CO2 31 05/10/2021 02:21 PM    Anion gap 3 (L) 05/10/2021 02:21 PM    Glucose 101 (H) 05/10/2021 02:21 PM    BUN 7 05/10/2021 02:21 PM    Creatinine 0.84 05/10/2021 02:21 PM    BUN/Creatinine ratio 8 (L) 05/10/2021 02:21 PM    GFR est AA >60 05/10/2021 02:21 PM    GFR est non-AA >60 05/10/2021 02:21 PM    Calcium 8.7 05/10/2021 02:21 PM    Bilirubin, total 0.8 05/10/2021 02:21 PM    Alk.  phosphatase 85 05/10/2021 02:21 PM    Protein, total 7.2 05/10/2021 02:21 PM    Albumin 4.0 05/10/2021 02:21 PM    Globulin 3.2 05/10/2021 02:21 PM    A-G Ratio 1.3 05/10/2021 02:21 PM    ALT (SGPT) 30 05/10/2021 02:21 PM    AST (SGOT) 25 05/10/2021 02:21 PM     Lab Results   Component Value Date/Time    WBC 5.2 05/10/2021 02:21 PM    Hemoglobin (POC) 14.4 04/19/2017 09:36 AM    Hemoglobin (POC) 15.0 03/27/2014 06:16 PM    HGB 14.5 05/10/2021 02:21 PM    Hematocrit (POC) 44 03/27/2014 06:16 PM    HCT 41.7 05/10/2021 02:21 PM    PLATELET 448 26/02/9330 02:21 PM    MCV 85.5 05/10/2021 02:21 PM     Lab Results   Component Value Date/Time    Prostate Specific Ag 2.5 10/29/2020 10:30 AM    Prostate Specific Ag 1.2 08/12/2015 03:15 PM    Prostate Specific Ag 1.6 05/22/2014 09:20 AM

## 2021-06-17 NOTE — TELEPHONE ENCOUNTER
V.TERELL OW called patient and was not able to speak with him. An automated message says : \"I'm sorry, but the person you've called has a voice mail that has not been set up yet. Good bye\" OW was documenting encounter when patient called back. Patient asked if his FA was approved. OW checked on patient's chart and his application was received and scanned by FA. OW provided phone number to patient where he needs to call and ask about the status of his application. Patient verbalized understanding.

## 2021-06-17 NOTE — PROGRESS NOTES
Patient states he is at home and patient does not have access to vital sign equipment. Coordination of Care  1. Have you been to the ER, urgent care clinic since your last visit? Hospitalized since your last visit? No    2. Have you seen or consulted any other health care providers outside of the [address] [place] [address] since your last visit? Include any pap smears or colon screening. No    Does the patient need refills? NO    Learning Assessment Complete? yes  Depression Screening complete in the past 12 months? yes     1158 am: SARA called the patient according to [address] [place] [address], PA, instructions: \"Stop flomax, Go to ER if sxs worsen, Access now to eye. \" I explained the patient about the  for him to go to ER. Patient does not want to go because he is been to ER in May of 2021 and he is receiving bills. I told the pt that it take 30 to 90 days for the hospital to process the information that he already sent to the Hospital. Patient verbalized understanding and denies further questions.  SARA sent an staff message to the  too

## 2021-06-17 NOTE — TELEPHONE ENCOUNTER
V.V. Patient called OW to say he called BS FA and his Care Card application was denied because it was incomplete. OW scheduled patient to complete a new Care Card application. Patient replied NO to all covid19 screening questions. An appt was made for 6/23/21 at 9:00am. Pending POI, Bank statement and bills.

## 2021-06-29 ENCOUNTER — OFFICE VISIT (OUTPATIENT)
Dept: FAMILY MEDICINE CLINIC | Age: 61
End: 2021-06-29

## 2021-06-29 ENCOUNTER — TELEPHONE (OUTPATIENT)
Dept: FAMILY MEDICINE CLINIC | Age: 61
End: 2021-06-29

## 2021-06-29 DIAGNOSIS — Z71.89 COUNSELING AND COORDINATION OF CARE: Primary | ICD-10-CM

## 2021-06-29 PROCEDURE — 99080 SPECIAL REPORTS OR FORMS: CPT | Performed by: PHYSICIAN ASSISTANT

## 2021-06-29 NOTE — PROGRESS NOTES
MAHOGANY. OW received a vm from patient after a No show. OW called patient back after receiving his message. An appt was scheduled for Friday 7/2/21 at 11:15am. Patient replied NO to all covid19 screening questions. Pending Bank statement, POI and [facility] bills.

## 2021-07-02 ENCOUNTER — OFFICE VISIT (OUTPATIENT)
Dept: FAMILY MEDICINE CLINIC | Age: 61
End: 2021-07-02

## 2021-07-02 DIAGNOSIS — Z71.89 COUNSELING AND COORDINATION OF CARE: Primary | ICD-10-CM

## 2021-07-02 PROCEDURE — 99080 SPECIAL REPORTS OR FORMS: CPT | Performed by: PHYSICIAN ASSISTANT

## 2021-07-02 NOTE — PROGRESS NOTES
OW met with patient. Patient did not bring Bills and Bank statement. Self attestation was completed. A new appt was made for 7/7/21 at 9:45am. Pending Bill and bank statement.

## 2021-07-07 ENCOUNTER — OFFICE VISIT (OUTPATIENT)
Dept: FAMILY MEDICINE CLINIC | Age: 61
End: 2021-07-07

## 2021-07-07 DIAGNOSIS — Z71.89 COUNSELING AND COORDINATION OF CARE: Primary | ICD-10-CM

## 2021-07-07 PROCEDURE — 99080 SPECIAL REPORTS OR FORMS: CPT | Performed by: PHYSICIAN ASSISTANT

## 2021-07-07 NOTE — PROGRESS NOTES
OW met with patient and assisted with Care Card application. Application was completed and signed. Patient brought pending bill account number and bank statement. OW provided completed application inside self addressed stamped envelope. Patient will mail it to [facility]. OW provided wrrtten instructions in Romanian about the next steps. Patient verbalized understanding.

## 2021-09-27 ENCOUNTER — HOSPITAL ENCOUNTER (EMERGENCY)
Age: 61
Discharge: HOME | End: 2021-09-27
Attending: EMERGENCY MEDICINE

## 2021-09-27 ENCOUNTER — APPOINTMENT (OUTPATIENT)
Dept: CT IMAGING | Age: 61
End: 2021-09-27
Attending: EMERGENCY MEDICINE

## 2021-09-27 VITALS
SYSTOLIC BLOOD PRESSURE: 148 MMHG | BODY MASS INDEX: 21.66 KG/M2 | WEIGHT: 138 LBS | TEMPERATURE: 96.9 F | OXYGEN SATURATION: 100 % | DIASTOLIC BLOOD PRESSURE: 82 MMHG | HEIGHT: 67 IN | RESPIRATION RATE: 16 BRPM | HEART RATE: 60 BPM

## 2021-09-27 DIAGNOSIS — R10.84 ABDOMINAL PAIN, GENERALIZED: Primary | ICD-10-CM

## 2021-09-27 DIAGNOSIS — N28.1 RENAL CYST: ICD-10-CM

## 2021-09-27 LAB
ALBUMIN SERPL-MCNC: 3.9 G/DL (ref 3.5–5)
ALBUMIN/GLOB SERPL: 1.1 {RATIO} (ref 1.1–2.2)
ALP SERPL-CCNC: 91 U/L (ref 45–117)
ALT SERPL-CCNC: 29 U/L (ref 12–78)
ANION GAP SERPL CALC-SCNC: 3 MMOL/L (ref 5–15)
APPEARANCE UR: CLEAR
AST SERPL-CCNC: 24 U/L (ref 15–37)
BACTERIA URNS QL MICRO: NEGATIVE /HPF
BASOPHILS # BLD: 0 K/UL (ref 0–0.1)
BASOPHILS NFR BLD: 0 % (ref 0–1)
BILIRUB SERPL-MCNC: 0.6 MG/DL (ref 0.2–1)
BILIRUB UR QL: NEGATIVE
BUN SERPL-MCNC: 7 MG/DL (ref 6–20)
BUN/CREAT SERPL: 9 (ref 12–20)
CALCIUM SERPL-MCNC: 9.1 MG/DL (ref 8.5–10.1)
CHLORIDE SERPL-SCNC: 106 MMOL/L (ref 97–108)
CO2 SERPL-SCNC: 31 MMOL/L (ref 21–32)
COLOR UR: NORMAL
COMMENT, HOLDF: NORMAL
CREAT SERPL-MCNC: 0.82 MG/DL (ref 0.7–1.3)
DIFFERENTIAL METHOD BLD: NORMAL
EOSINOPHIL # BLD: 0.1 K/UL (ref 0–0.4)
EOSINOPHIL NFR BLD: 3 % (ref 0–7)
EPITH CASTS URNS QL MICRO: NORMAL /LPF
ERYTHROCYTE [DISTWIDTH] IN BLOOD BY AUTOMATED COUNT: 13.2 % (ref 11.5–14.5)
GLOBULIN SER CALC-MCNC: 3.7 G/DL (ref 2–4)
GLUCOSE SERPL-MCNC: 91 MG/DL (ref 65–100)
GLUCOSE UR STRIP.AUTO-MCNC: NEGATIVE MG/DL
HCT VFR BLD AUTO: 42.2 % (ref 36.6–50.3)
HGB BLD-MCNC: 15 G/DL (ref 12.1–17)
HGB UR QL STRIP: NEGATIVE
IMM GRANULOCYTES # BLD AUTO: 0 K/UL (ref 0–0.04)
IMM GRANULOCYTES NFR BLD AUTO: 0 % (ref 0–0.5)
KETONES UR QL STRIP.AUTO: NEGATIVE MG/DL
LEUKOCYTE ESTERASE UR QL STRIP.AUTO: NEGATIVE
LIPASE SERPL-CCNC: 122 U/L (ref 73–393)
LYMPHOCYTES # BLD: 1.2 K/UL (ref 0.8–3.5)
LYMPHOCYTES NFR BLD: 28 % (ref 12–49)
MCH RBC QN AUTO: 30.7 PG (ref 26–34)
MCHC RBC AUTO-ENTMCNC: 35.5 G/DL (ref 30–36.5)
MCV RBC AUTO: 86.3 FL (ref 80–99)
MONOCYTES # BLD: 0.4 K/UL (ref 0–1)
MONOCYTES NFR BLD: 9 % (ref 5–13)
NEUTS SEG # BLD: 2.5 K/UL (ref 1.8–8)
NEUTS SEG NFR BLD: 60 % (ref 32–75)
NITRITE UR QL STRIP.AUTO: NEGATIVE
NRBC # BLD: 0 K/UL (ref 0–0.01)
NRBC BLD-RTO: 0 PER 100 WBC
PH UR STRIP: 7.5 [PH] (ref 5–8)
PLATELET # BLD AUTO: 267 K/UL (ref 150–400)
PMV BLD AUTO: 9.6 FL (ref 8.9–12.9)
POTASSIUM SERPL-SCNC: 4.2 MMOL/L (ref 3.5–5.1)
PROT SERPL-MCNC: 7.6 G/DL (ref 6.4–8.2)
PROT UR STRIP-MCNC: NEGATIVE MG/DL
RBC # BLD AUTO: 4.89 M/UL (ref 4.1–5.7)
RBC #/AREA URNS HPF: NORMAL /HPF (ref 0–5)
SAMPLES BEING HELD,HOLD: NORMAL
SODIUM SERPL-SCNC: 140 MMOL/L (ref 136–145)
SP GR UR REFRACTOMETRY: <1.005 (ref 1–1.03)
UR CULT HOLD, URHOLD: NORMAL
UROBILINOGEN UR QL STRIP.AUTO: 0.2 EU/DL (ref 0.2–1)
WBC # BLD AUTO: 4.1 K/UL (ref 4.1–11.1)
WBC URNS QL MICRO: NORMAL /HPF (ref 0–4)

## 2021-09-27 PROCEDURE — 36415 COLL VENOUS BLD VENIPUNCTURE: CPT

## 2021-09-27 PROCEDURE — 74011000636 HC RX REV CODE- 636: Performed by: STUDENT IN AN ORGANIZED HEALTH CARE EDUCATION/TRAINING PROGRAM

## 2021-09-27 PROCEDURE — 85025 COMPLETE CBC W/AUTO DIFF WBC: CPT

## 2021-09-27 PROCEDURE — 83690 ASSAY OF LIPASE: CPT

## 2021-09-27 PROCEDURE — 80053 COMPREHEN METABOLIC PANEL: CPT

## 2021-09-27 PROCEDURE — 74177 CT ABD & PELVIS W/CONTRAST: CPT

## 2021-09-27 PROCEDURE — 99282 EMERGENCY DEPT VISIT SF MDM: CPT

## 2021-09-27 PROCEDURE — 81001 URINALYSIS AUTO W/SCOPE: CPT

## 2021-09-27 RX ORDER — OMEPRAZOLE 20 MG/1
20 TABLET, DELAYED RELEASE ORAL DAILY
Qty: 30 TABLET | Refills: 2 | Status: SHIPPED | OUTPATIENT
Start: 2021-09-27 | End: 2021-10-06

## 2021-09-27 RX ADMIN — IOPAMIDOL 100 ML: 755 INJECTION, SOLUTION INTRAVENOUS at 13:15

## 2021-09-27 NOTE — ED NOTES
Gastritis and weight loss for 2 months with persistent periumbilical abdominal pain.  Javid Haile, NP

## 2021-09-27 NOTE — ED TRIAGE NOTES
Interpretor #[id number] used to complete triage-    Patient presents with a history of gastritis; reports he normally goes to the [address] but has been out of his gastritis medication for the last month so is having abdominal pain for the last month    Patient also reports generalized aches and pain that are worse in the AM and reports he was diagnosed with arthritis but is requesting that these be evaluated as well-    Patient is also requesting an x-ray of his abdomen to rule out a prostate problem

## 2021-09-27 NOTE — ED PROVIDER NOTES
HPI patient is a 59-year-old  male with past medical history significant for BPH, weight loss and GERD who presents to the ED reporting abdominal pain and generalized body aches for several months. He has been evaluated by the [facility] many months ago but has not followed up. Denies fever, cold symptoms, headache, neck pain, visual changes, focal weakness or rash. Denies any  difficulty breathing, difficulty swallowing, SOB or chest pain. Denies any nausea, vomiting, urinary symptoms, constipation or diarrhea. Pt. Reports that he has not had any medications today prior to arrival.  He drove himself here. 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      Past Medical History:   Diagnosis Date    BPH (benign prostatic hyperplasia)        Past Surgical History:   Procedure Laterality Date    UPPER GI ENDOSCOPY,BIOPSY  7/15/2020              Family History:   Problem Relation Age of Onset    Parkinsonism Mother     Arthritis-osteo Father     Diabetes Brother     Heart Disease Brother     Heart Surgery Brother        Social History     Socioeconomic History    Marital status: SINGLE     Spouse name: Not on file    Number of children: Not on file    Years of education: Not on file    Highest education level: Not on file   Occupational History    Not on file   Tobacco Use    Smoking status: Never Smoker    Smokeless tobacco: Never Used   Substance and Sexual Activity    Alcohol use: No     Alcohol/week: 0.0 standard drinks    Drug use: No    Sexual activity: Yes     Partners: Female   Other Topics Concern    Not on file   Social History Narrative    ** Merged History Encounter **         ** Merged History Encounter **          Social Determinants of Health     Financial Resource Strain:     Difficulty of Paying Living Expenses:    Food Insecurity:     Worried About Running Out of Food in the Last Year:     Ran Out of Food in the Last Year:    Transportation Needs:     Lack of Transportation (Medical):      Lack of Transportation (Non-Medical):    Physical Activity:     Days of Exercise per Week:     Minutes of Exercise per Session:    Stress:     Feeling of Stress :    Social Connections:     Frequency of Communication with Friends and Family:     Frequency of Social Gatherings with Friends and Family:     Attends Tenriism Services:     Active Member of Clubs or Organizations:     Attends Club or Organization Meetings:     Marital Status:    Intimate Partner Violence:     Fear of Current or Ex-Partner:     Emotionally Abused:     Physically Abused:     Sexually Abused: ALLERGIES: Patient has no known allergies. Review of Systems   Constitutional: Positive for unexpected weight change. Negative for activity change and appetite change. HENT: Negative for congestion, sore throat and trouble swallowing. Eyes: Negative for visual disturbance. Respiratory: Negative for cough and shortness of breath. Cardiovascular: Negative for chest pain, palpitations and leg swelling. Gastrointestinal: Positive for abdominal pain. Negative for diarrhea, nausea and vomiting. Genitourinary: Negative for difficulty urinating, dysuria and flank pain. Musculoskeletal: Positive for arthralgias. Negative for myalgias. Skin: Negative for rash. Neurological: Negative for dizziness, light-headedness and headaches. All other systems reviewed and are negative. Vitals:    09/27/21 1043   BP: (!) 148/82   Pulse: 60   Resp: 16   Temp: 96.9 °F (36.1 °C)   SpO2: 100%   Weight: 62.6 kg (138 lb)   Height: 5' 7.32\" (1.71 m)            Physical Exam  Vitals and nursing note reviewed. Constitutional:       General: He is not in acute distress. Appearance: He is well-developed and normal weight. He is not ill-appearing, toxic-appearing or diaphoretic. Comments:  male, non-smoker; occasional construction day labor worker   HENT:      Head: Normocephalic.    Cardiovascular:      Rate and Rhythm: Normal rate and regular rhythm. Pulmonary:      Effort: Pulmonary effort is normal.      Breath sounds: Normal breath sounds. Abdominal:      General: Bowel sounds are normal. There is no distension. There are no signs of injury. Palpations: Abdomen is soft. Tenderness: There is abdominal tenderness in the periumbilical area. There is no guarding or rebound. Hernia: No hernia is present. Skin:     General: Skin is warm and dry. Findings: No rash. Neurological:      General: No focal deficit present. Mental Status: He is alert and oriented to person, place, and time. MDM       Procedures    CT ABD PELV W CONT    Result Date: 9/27/2021  1. No acute abdominal or pelvic abnormality is confirmed. 2. Indeterminate right renal mass upper pole. While a vague hypodensity was present in this region on the prior study, there was no contrast enhancement. This may represent a complicated/complex cyst, but further evaluation would require dedicated three-phase renal CT at a later date. 3. Urinary bladder distention with fullness of the ureters, which may be related to prostatic enlargement. It is relatively stable. 4. Other incidental changes described above. Labs Reviewed   METABOLIC PANEL, COMPREHENSIVE - Abnormal; Notable for the following components:       Result Value    Anion gap 3 (*)     BUN/Creatinine ratio 9 (*)     All other components within normal limits   URINE CULTURE HOLD SAMPLE   CBC WITH AUTOMATED DIFF   LIPASE   SAMPLES BEING HELD   URINALYSIS W/MICROSCOPIC       Discussed plan of care with Dr. Nakia Beaver. I  will write a prescription for Prilosec extended release to take once daily. Recommend close follow-up with the [address] [place] [address] gastroenterologist and with [facility] for further evaluation. 3:01 PM  Patient's results and plan of care have been reviewed with him. Language line was employed to review discharge instructions, recommended follow-up and test result. Patient and/or family have verbally conveyed their understanding and agreement of the patient's signs, symptoms, diagnosis, treatment and prognosis and additionally agree to follow up as recommended or return to the Emergency Room should his condition change prior to follow-up. Discharge instructions have also been provided to the patient with some educational information regarding his diagnosis as well a list of reasons why he would want to return to the ER prior to his follow-up appointment should his condition change. Juana Ndiaye NP

## 2021-09-28 ENCOUNTER — HOSPITAL ENCOUNTER (OUTPATIENT)
Dept: LAB | Age: 61
Discharge: HOME | End: 2021-09-28

## 2021-09-28 ENCOUNTER — OFFICE VISIT (OUTPATIENT)
Dept: FAMILY MEDICINE CLINIC | Age: 61
End: 2021-09-28

## 2021-09-28 VITALS
SYSTOLIC BLOOD PRESSURE: 138 MMHG | BODY MASS INDEX: 24.44 KG/M2 | OXYGEN SATURATION: 99 % | TEMPERATURE: 97.8 F | HEART RATE: 58 BPM | WEIGHT: 165 LBS | DIASTOLIC BLOOD PRESSURE: 75 MMHG | HEIGHT: 69 IN

## 2021-09-28 DIAGNOSIS — N28.1 RENAL CYST: ICD-10-CM

## 2021-09-28 DIAGNOSIS — M79.10 MYALGIA: ICD-10-CM

## 2021-09-28 DIAGNOSIS — Z09 HOSPITAL DISCHARGE FOLLOW-UP: ICD-10-CM

## 2021-09-28 DIAGNOSIS — R10.2 SUPRAPUBIC PAIN: ICD-10-CM

## 2021-09-28 DIAGNOSIS — R39.14 BENIGN PROSTATIC HYPERPLASIA WITH INCOMPLETE BLADDER EMPTYING: ICD-10-CM

## 2021-09-28 DIAGNOSIS — N40.1 BENIGN PROSTATIC HYPERPLASIA WITH INCOMPLETE BLADDER EMPTYING: ICD-10-CM

## 2021-09-28 DIAGNOSIS — M79.10 MYALGIA: Primary | ICD-10-CM

## 2021-09-28 PROCEDURE — 1111F DSCHRG MED/CURRENT MED MERGE: CPT | Performed by: FAMILY MEDICINE

## 2021-09-28 PROCEDURE — 99214 OFFICE O/P EST MOD 30 MIN: CPT | Performed by: FAMILY MEDICINE

## 2021-09-28 PROCEDURE — 86141 C-REACTIVE PROTEIN HS: CPT

## 2021-09-28 PROCEDURE — 85652 RBC SED RATE AUTOMATED: CPT

## 2021-09-28 RX ORDER — NORTRIPTYLINE HYDROCHLORIDE 25 MG/1
25 CAPSULE ORAL
Qty: 30 CAPSULE | Refills: 1 | Status: SHIPPED | OUTPATIENT
Start: 2021-09-28 | End: 2021-10-19 | Stop reason: SDUPTHER

## 2021-09-28 NOTE — PROGRESS NOTES
Devika Pulido is a 61 y.o. male    Chief Complaint   Patient presents with   [facility] Follow Up     ED yesterday, Renal cyst; Pain in whole body; needs referral for Nephro;

## 2021-09-28 NOTE — PROGRESS NOTES
Tessa Chilel is a 61 y.o. male   Chief Complaint   Patient presents with   [facility] Follow Up     ED yesterday, Renal cyst; Pain in whole body; needs referral for Nephro;          ASSESSMENT AND PLAN:    1. Myalgia  Will trial TCA for chronic MSK pain as well as painful bladder. Has already had negative autoimmune/RA labs. Will check for PMR with ESR/CRP. F/U after labs  - nortriptyline (PAMELOR) 25 mg capsule; Take 1 Capsule by mouth nightly. Indications: chronic pain  Dispense: 30 Capsule; Refill: 1  - SED RATE (ESR); Future  - CRP, HIGH SENSITIVITY; Future    2. Renal cyst  Refer to nephro for further evaluation. Pt very concerned about damaging his kidneys.  - REFERRAL TO NEPHROLOGY    3. Benign prostatic hyperplasia with incomplete bladder emptying  Pt is not clear on why he stopped flomax or meds for chronic prostatitis except for \"hurting his kidneys\"  Refer to urology for evaluation and additional treatment options in setting of enlarged prostate with some evidence of urine retention.    - REFERRAL TO UROLOGY    4. Suprapubic pain  - REFERRAL TO UROLOGY    5. Hospital discharge follow-up  Pt has not picked up nexium, it hasn't worked for him in the past.  Currently not on medications.  - PA DISCHARGE MEDS RECONCILED W/ CURRENT OUTPATIENT MED LIST          SUBJECTIVE:    HPI:  Fred Tatum is a 61 y.o. male who presents for ER follow-up. HE was seen yesterday for abdominal pain, body aches and weight loss. These are not new problems he has been seen here for the same. His CT Abd/Pelvis yesterday showed a non-specific renal cyst --which is worrying him and an enlarged prostate with some intraductal dilatation which may be resultant. He reports a constant suprapubic pain. He has shoulder, neck and upper back pain as well as lower back hip and knee pain. He knows he has cervical DDD.   HE was prescribed cymbalta and flomax and does not have a clear explanation as to why he stopped taking them other than damaging his kidneys. He was discharged with Nexium, which he hasn't picked up because it didn't work in the past. He was told to see urology and GI. He wants to see neprhology. Review of Systems   Constitutional: Positive for malaise/fatigue. Negative for fever. Eyes: Negative for blurred vision. Respiratory: Negative for cough and shortness of breath. Cardiovascular: Negative for chest pain, palpitations and leg swelling. Gastrointestinal: Positive for abdominal pain. Negative for constipation, diarrhea, nausea and vomiting. Genitourinary: Positive for frequency and urgency. Negative for dysuria, flank pain and hematuria. Musculoskeletal: Positive for back pain, joint pain, myalgias and neck pain. Negative for falls. Neurological: Negative for dizziness and headaches. /75 (BP 1 Location: Right upper arm, BP Patient Position: Sitting)   Pulse (!) 58   Temp 97.8 °F (36.6 °C) (Temporal)   Ht 5' 9.09\" (1.755 m)   Wt 165 lb (74.8 kg)   SpO2 99%   BMI 24.30 kg/m²     Physical Exam  Constitutional:       General: He is not in acute distress. Appearance: Normal appearance. HENT:      Head: Normocephalic and atraumatic. Mouth/Throat:      Mouth: Mucous membranes are moist.      Pharynx: Oropharynx is clear. No oropharyngeal exudate or posterior oropharyngeal erythema. Eyes:      Extraocular Movements: Extraocular movements intact. Conjunctiva/sclera: Conjunctivae normal.      Pupils: Pupils are equal, round, and reactive to light. Neck:      Vascular: No carotid bruit. Cardiovascular:      Rate and Rhythm: Normal rate and regular rhythm. Pulses: Normal pulses. Heart sounds: Normal heart sounds. Pulmonary:      Effort: Pulmonary effort is normal. No respiratory distress. Breath sounds: Normal breath sounds. Abdominal:      General: Bowel sounds are normal. There is no distension.       Palpations: Abdomen is soft.      Tenderness: There is abdominal tenderness (suprapubic). Musculoskeletal:         General: Normal range of motion. Cervical back: No tenderness. Right lower leg: No edema. Left lower leg: No edema. Lymphadenopathy:      Cervical: No cervical adenopathy. Skin:     General: Skin is warm. Neurological:      Mental Status: He is alert and oriented to person, place, and time.       Gait: Gait normal.      Deep Tendon Reflexes: Reflexes normal.   Psychiatric:         Behavior: Behavior normal.

## 2021-09-28 NOTE — PROGRESS NOTES
I have printed AVS and reviewed it with patient today. I have copied the provider's check out note here and have reviewed these items with the patient today:Check-out Note: Labs   Med sent to ed   Referal to Nephrology and Urology via AN. Patient verbalized understanding.  Giovani Hilliard RN

## 2021-09-29 DIAGNOSIS — R70.0 ESR RAISED: Primary | ICD-10-CM

## 2021-09-29 LAB
CRP SERPL HS-MCNC: 0.8 MG/L
ERYTHROCYTE [SEDIMENTATION RATE] IN BLOOD: 108 MM/HR (ref 0–20)

## 2021-09-29 RX ORDER — PREDNISONE 5 MG/1
15 TABLET ORAL DAILY
Qty: 21 TABLET | Refills: 0 | Status: SHIPPED | OUTPATIENT
Start: 2021-09-29 | End: 2021-10-19 | Stop reason: SDUPTHER

## 2021-09-29 NOTE — PROGRESS NOTES
Normal CRP. Elevated ESR. Still possibility of PMR given symptoms/severity. Will trial one week Prednisone 15mg and monitor response. F/U in 7-10 days.

## 2021-10-06 ENCOUNTER — VIRTUAL VISIT (OUTPATIENT)
Dept: FAMILY MEDICINE CLINIC | Age: 61
End: 2021-10-06

## 2021-10-06 DIAGNOSIS — M79.10 MYALGIA: ICD-10-CM

## 2021-10-06 DIAGNOSIS — R10.2 SUPRAPUBIC PAIN: ICD-10-CM

## 2021-10-06 DIAGNOSIS — R70.0 ESR RAISED: Primary | ICD-10-CM

## 2021-10-06 PROCEDURE — 99214 OFFICE O/P EST MOD 30 MIN: CPT | Performed by: FAMILY MEDICINE

## 2021-10-06 NOTE — PROGRESS NOTES
Rito Waller is a 61 y.o. male   Chief Complaint   Patient presents with    Labs     Lab results    Follow-up     join pain          ASSESSMENT AND PLAN:    1. ESR raised  Discussed 1/2 inflammatory marker elevation. It might be c/w PMR but could also be due to his prostate/bladder. Will trial one week of low dose prednisone (15mg/day). If improvement is marked, would be c/w PMR and could continue treatment. Follow up with virtual visit in one week. 2. Myalgia    3. Suprapubic pain  Pt was referred to Urology and Nephrology. Will call AN to confirm referral per note on Nephrology referral.  Continue Nortriptyline as pt reports there is some benefit. SUBJECTIVE:    HPI:  Fred STANLEYMicheal Arabellaabhijit Kruse is a 61 y.o. male who presents via telephone to inquire about the referrals placed at his last visit. Per the referral tab he is able to call AN after 10/5 to confirm the referral. He hadn't received the letter in the mail yet. He reports that he has been taking the nortriptyline and that it has helped him some but he still has pain \"below his belly button\" and diffuse myalgias and arthralgias. We discussed his inflammatory markers. He got up this morning and ate some bananas and now his stomach (below his belly button) is bothering him. He denies nausea, vomiting, diarrhea, constipation and fevers. He reports frequency and urgency. No dysuria or flank pain      ROS per HPI, otherwise negative. There were no vitals taken for this visit.     Physical Exam Telephone visit; not performed

## 2021-10-06 NOTE — PROGRESS NOTES
Patient does not have access to vital sign equipment at home  Coordination of Care  1. Have you been to the ER, urgent care clinic since your last visit? Hospitalized since your last visit? NO    2. Have you seen or consulted any other health care providers outside of the [address] [place] [address] since your last visit? Include any pap smears or colon screening. No    Does the patient need refills? NO    Learning Assessment Complete? yes  Depression Screening complete in the past 12 months? yes  1201 pm: [facility] called the patient to confirmed receipt of the Goodrx coupon sent via text message. Discussed/refills medicine with patient.  Patient verbalized understanding

## 2021-10-12 ENCOUNTER — TELEPHONE (OUTPATIENT)
Dept: FAMILY MEDICINE CLINIC | Age: 61
End: 2021-10-12

## 2021-10-12 NOTE — TELEPHONE ENCOUNTER
----- Message from Vance Holt MD sent at 9/29/2021 10:52 AM EDT -----  Regarding: result/med callback  1)Follow up appointment in 7-10 days. Virtual or F2F per pt preference. 2) Could you please call the patient and let him know that one of his markers of inflammation is high. The other is normal.  This #could# be consistent with polymyalgia rheumatica -- the illness that causes pain along the neck and shoulder and/or the hips/lower back. I would like to trial a low dose steroid for one week to see if it relieves his pain -- that would confirm the diagnosis. I have sent prednisone 15mg to his pharmacy. He should take one pill a day for one week. Then we'll follow up in 7-10 days and decide the next steps based on his response. Thank you!

## 2021-10-12 NOTE — TELEPHONE ENCOUNTER
Tc to the pt 2x no answer and no VM box set up. A letter was created and printed and placed to be mailed to the pt.  The letter is asking the pt to call the cbn for his labs and a message from his Dr. Debora Rivas RN

## 2021-10-12 NOTE — LETTER
10/12/2021 3:36 PM    Mr. Jose De Jesus Torres  [address] [facility] [address]      Please call the [address] [place] [address] nurse for your lab results and the message from your Doctor. Phone number is; [phone]. Llame a la enfermera de [employer] para obtener amena resultados de laboratorio y [address] de [facility] méd[facility].    El número de [facility]; [phone].       Sincerely,      Janice Tyson MD / Kenneth Carlisle RN

## 2021-10-19 ENCOUNTER — OFFICE VISIT (OUTPATIENT)
Dept: FAMILY MEDICINE CLINIC | Age: 61
End: 2021-10-19

## 2021-10-19 VITALS
SYSTOLIC BLOOD PRESSURE: 134 MMHG | HEART RATE: 77 BPM | TEMPERATURE: 97.6 F | HEIGHT: 70 IN | DIASTOLIC BLOOD PRESSURE: 75 MMHG | OXYGEN SATURATION: 97 % | BODY MASS INDEX: 19.1 KG/M2 | WEIGHT: 133.4 LBS

## 2021-10-19 DIAGNOSIS — R70.0 ESR RAISED: ICD-10-CM

## 2021-10-19 DIAGNOSIS — M79.10 MYALGIA: ICD-10-CM

## 2021-10-19 DIAGNOSIS — L29.3 ITCHING OF PENIS: ICD-10-CM

## 2021-10-19 DIAGNOSIS — R10.2 SUPRAPUBIC PAIN: ICD-10-CM

## 2021-10-19 DIAGNOSIS — N50.819 PAIN IN TESTICLE, UNSPECIFIED LATERALITY: ICD-10-CM

## 2021-10-19 DIAGNOSIS — R63.4 WEIGHT LOSS: Primary | ICD-10-CM

## 2021-10-19 LAB
BILIRUB UR QL STRIP: NEGATIVE
GLUCOSE UR-MCNC: NEGATIVE MG/DL
KETONES P FAST UR STRIP-MCNC: NEGATIVE MG/DL
PH UR STRIP: 7.5 [PH] (ref 4.6–8)
PROT UR QL STRIP: NEGATIVE
SP GR UR STRIP: 1 (ref 1–1.03)
UA UROBILINOGEN AMB POC: NORMAL (ref 0.2–1)
URINALYSIS CLARITY POC: CLEAR
URINALYSIS COLOR POC: YELLOW
URINE BLOOD POC: NEGATIVE
URINE LEUKOCYTES POC: NEGATIVE
URINE NITRITES POC: NEGATIVE

## 2021-10-19 PROCEDURE — 81002 URINALYSIS NONAUTO W/O SCOPE: CPT | Performed by: NURSE PRACTITIONER

## 2021-10-19 PROCEDURE — 99214 OFFICE O/P EST MOD 30 MIN: CPT | Performed by: NURSE PRACTITIONER

## 2021-10-19 RX ORDER — NORTRIPTYLINE HYDROCHLORIDE 25 MG/1
25 CAPSULE ORAL
Qty: 30 CAPSULE | Refills: 5 | Status: SHIPPED | OUTPATIENT
Start: 2021-10-19

## 2021-10-19 RX ORDER — PREDNISONE 5 MG/1
15 TABLET ORAL DAILY
Qty: 21 TABLET | Refills: 0 | Status: SHIPPED | OUTPATIENT
Start: 2021-10-19 | End: 2021-10-26

## 2021-10-19 RX ORDER — NYSTATIN 100000 U/G
CREAM TOPICAL 2 TIMES DAILY
Qty: 15 G | Refills: 0 | Status: SHIPPED | OUTPATIENT
Start: 2021-10-19

## 2021-10-19 NOTE — PROGRESS NOTES
Coordination of Care  1. Have you been to the ER, urgent care clinic since your last visit? Hospitalized since your last visit? No    2. Have you seen or consulted any other health care providers outside of the [address] [place] [address] since your last visit? Include any pap smears or colon screening. No    Does the patient need refills? YES    Learning Assessment Complete?  yes  Depression Screening complete in the past 12 months? yes     Results for orders placed or performed in visit on 10/19/21   AMB POC URINALYSIS DIP STICK MANUAL W/O MICRO   Result Value Ref Range    Color (UA POC) Yellow     Clarity (UA POC) Clear     Glucose (UA POC) Negative Negative    Bilirubin (UA POC) Negative Negative    Ketones (UA POC) Negative Negative    Specific gravity (UA POC) 1.005 1.001 - 1.035    Blood (UA POC) Negative Negative    pH (UA POC) 7.5 4.6 - 8.0    Protein (UA POC) Negative Negative    Urobilinogen (UA POC) 0.2 mg/dL 0.2 - 1    Nitrites (UA POC) Negative Negative    Leukocyte esterase (UA POC) Negative Negative

## 2021-10-19 NOTE — PROGRESS NOTES
10/19/2021 : Eusebio Ibarra (: 1960) is a 61 y.o. male, established patient, here for evaluation of the following chief complaint(s):  Medication Refill (Patient states he went to pharmacy and he did not have any more refills for prednisone 5mg 3 tabs daily ) and Urinary Odor (x1 week, patient states tip of penis is red and itchy )     ASSESSMENT/PLAN:  Below is the assessment and plan developed based on review of pertinent history, physical exam, labs, studies, and medications. 1. Weight loss  2. Suprapubic pain  -     AMB POC URINALYSIS DIP STICK MANUAL W/O MICRO  3. ESR raised  -     predniSONE (DELTASONE) 5 mg tablet; Take 3 Tablets by mouth daily for 7 days. , Normal, Disp-21 Tablet, R-0  4. Myalgia  -     nortriptyline (PAMELOR) 25 mg capsule; Take 1 Capsule by mouth nightly. Indications: chronic pain, Normal, Disp-30 Capsule, R-5  5. Itching of penis  -     nystatin (MYCOSTATIN) topical cream; Apply  to affected area two (2) times a day., Normal, Disp-15 g, R-0  6. Pain in testicle, unspecified laterality    Return for F2F 4 weeks Dr. Jg Chow (weight loss). SUBJECTIVE/OBJECTIVE: I note he is still losing weight. HPI Has appt with urology (Nov 3) and nephrology (will be after urologist)? He did have benefit from the prednisone. Pain in both testicles. Mild, pulsing. Dull. It feels better when he drinks a lot of water.       Results for orders placed or performed in visit on 10/19/21   AMB POC URINALYSIS DIP STICK MANUAL W/O MICRO   Result Value Ref Range    Color (UA POC) Yellow     Clarity (UA POC) Clear     Glucose (UA POC) Negative Negative    Bilirubin (UA POC) Negative Negative    Ketones (UA POC) Negative Negative    Specific gravity (UA POC) 1.005 1.001 - 1.035    Blood (UA POC) Negative Negative    pH (UA POC) 7.5 4.6 - 8.0    Protein (UA POC) Negative Negative    Urobilinogen (UA POC) 0.2 mg/dL 0.2 - 1    Nitrites (UA POC) Negative Negative Leukocyte esterase (UA POC) Negative Negative       Review of Systems: Negative for: fever, chest pain, shortness of breath, leg swelling. Social History:  reports that he has never smoked. He has never used smokeless tobacco. He reports that he does not drink alcohol and does not use drugs. Current Medications:   Current Outpatient Medications   Medication Sig    predniSONE (DELTASONE) 5 mg tablet Take 3 Tablets by mouth daily for 7 days.  nortriptyline (PAMELOR) 25 mg capsule Take 1 Capsule by mouth nightly. Indications: chronic pain    nystatin (MYCOSTATIN) topical cream Apply  to affected area two (2) times a day. Physical Examination:   Vitals:    10/19/21 1305   BP: 134/75   Pulse: 77   Temp: 97.6 °F (36.4 °C)   TempSrc: Temporal   SpO2: 97%   Weight: 133 lb 6.4 oz (60.5 kg)   Height: 5' 9.61\" (1.768 m)      Wt Readings from Last 3 Encounters:   10/19/21 133 lb 6.4 oz (60.5 kg)   09/28/21 165 lb (74.8 kg)   09/27/21 138 lb (62.6 kg)     General appearance - well developed, no acute distress. Chest - clear to auscultation. Heart - regular rate and rhythm without murmurs, rubs, or gallops. Abdomen - bowel sounds present x 4, NT, ND  Extremities - no CCE.  - discomfort with elevation of the testes, L>R.  NO MASSES. An electronic signature was used to authenticate this note.   -- Yrn Roberson NP

## 2021-11-05 ENCOUNTER — VIRTUAL VISIT (OUTPATIENT)
Dept: FAMILY MEDICINE CLINIC | Age: 61
End: 2021-11-05

## 2021-11-05 DIAGNOSIS — Z71.89 COUNSELING AND COORDINATION OF CARE: Primary | ICD-10-CM

## 2021-11-05 NOTE — PROGRESS NOTES
Patient needed assistance with medicines that were sent by [employer] to [facility]. Patient called OW asking for her assistance. OW interpreted for patient and he received his meds.

## 2021-11-17 ENCOUNTER — HOSPITAL ENCOUNTER (OUTPATIENT)
Dept: LAB | Age: 61
Discharge: HOME | End: 2021-11-17

## 2021-11-17 ENCOUNTER — OFFICE VISIT (OUTPATIENT)
Dept: FAMILY MEDICINE CLINIC | Age: 61
End: 2021-11-17

## 2021-11-17 VITALS
TEMPERATURE: 98.2 F | OXYGEN SATURATION: 100 % | HEART RATE: 76 BPM | SYSTOLIC BLOOD PRESSURE: 122 MMHG | DIASTOLIC BLOOD PRESSURE: 77 MMHG | BODY MASS INDEX: 19.88 KG/M2 | WEIGHT: 137 LBS

## 2021-11-17 DIAGNOSIS — M25.60 MORNING STIFFNESS OF JOINTS: Primary | ICD-10-CM

## 2021-11-17 DIAGNOSIS — M25.50 POLYARTHRALGIA: ICD-10-CM

## 2021-11-17 DIAGNOSIS — R70.0 ELEVATED SED RATE: ICD-10-CM

## 2021-11-17 PROCEDURE — 86038 ANTINUCLEAR ANTIBODIES: CPT

## 2021-11-17 PROCEDURE — 86200 CCP ANTIBODY: CPT

## 2021-11-17 PROCEDURE — 99213 OFFICE O/P EST LOW 20 MIN: CPT | Performed by: FAMILY MEDICINE

## 2021-11-17 PROCEDURE — 86431 RHEUMATOID FACTOR QUANT: CPT

## 2021-11-17 PROCEDURE — 85652 RBC SED RATE AUTOMATED: CPT

## 2021-11-17 PROCEDURE — 85025 COMPLETE CBC W/AUTO DIFF WBC: CPT

## 2021-11-17 RX ORDER — CELECOXIB 200 MG/1
200 CAPSULE ORAL 2 TIMES DAILY
Qty: 60 CAPSULE | Refills: 2 | Status: SHIPPED | OUTPATIENT
Start: 2021-11-17 | End: 2022-01-18 | Stop reason: SDUPTHER

## 2021-11-17 NOTE — PROGRESS NOTES
Coordination of Care  1. Have you been to the ER, urgent care clinic since your last visit? Hospitalized since your last visit? No    2. Have you seen or consulted any other health care providers outside of the [address] [place] [address] since your last visit? Include any pap smears or colon screening. No    Does the patient need refills? N/A    Learning Assessment Complete?  yes

## 2021-11-17 NOTE — PROGRESS NOTES
HISTORY OF PRESENT ILLNESS  Fred Berger is a 61 y.o. male. HPI  Patient has remains with joint pains. Every morning he has stiffness,  It can take about 1 hour to improve but it continues during the day,  It has been going on for at least 1 year. He is having myalgias of the lower extremities, both shoulders,  Neck pain,  Lower back pain. This has been going on for over 1 year  Review of Systems   Constitutional: Negative for chills, fever and weight loss. Cardiovascular: Negative for chest pain, palpitations and orthopnea. Gastrointestinal: Negative for abdominal pain and heartburn. Genitourinary: Negative for dysuria, frequency and urgency. Musculoskeletal: Positive for back pain, joint pain, myalgias and neck pain. /77 (BP 1 Location: Left upper arm, BP Patient Position: Sitting)   Pulse 76   Temp 98.2 °F (36.8 °C) (Temporal)   Wt 137 lb (62.1 kg)   SpO2 100%   BMI 19.88 kg/m²   Physical Exam  Constitutional:       General: He is not in acute distress. Appearance: He is not ill-appearing. HENT:      Right Ear: Tympanic membrane normal. There is no impacted cerumen. Left Ear: Tympanic membrane normal. There is no impacted cerumen. Nose: Nose normal. No congestion or rhinorrhea. Cardiovascular:      Rate and Rhythm: Normal rate and regular rhythm. Pulses: Normal pulses. Heart sounds: No murmur heard. Pulmonary:      Effort: Pulmonary effort is normal.      Breath sounds: Normal breath sounds. No wheezing or rhonchi. Abdominal:      General: Bowel sounds are normal. There is no distension. Palpations: Abdomen is soft. There is no mass. Musculoskeletal:         General: Swelling, tenderness and deformity present. Comments: Flexion deformity of left 4th finger   Neurological:      Mental Status: He is alert. ASSESSMENT and PLAN  Diagnoses and all orders for this visit:    1.  Morning stiffness of joints  -     REFERRAL TO RHEUMATOLOGY    2. Elevated sed rate  -     celecoxib (CELEBREX) 200 mg capsule; Take 1 Capsule by mouth two (2) times a day. -     REFERRAL TO RHEUMATOLOGY  -     TAVON, DIRECT, W/REFLEX; Future  -     CYCLIC CITRUL PEPTIDE AB, IGG; Future  -     SED RATE (ESR); Future  -     CBC WITH AUTOMATED DIFF; Future  -     RHEUMATOID FACTOR, QT; Future    3.  Polyarthralgia  -     REFERRAL TO RHEUMATOLOGY      Patient with morning stiffness, polyarthralgia of 1 year duration, recent labs showed elevated sed rate,  He likely has Rheumatoid arthritis,  We will refer to rheumatology, check labs today, may take celebrex as needed while we wait for rheumatology appointment

## 2021-11-18 LAB
BASOPHILS # BLD: 0 K/UL (ref 0–0.1)
BASOPHILS NFR BLD: 0 % (ref 0–1)
DIFFERENTIAL METHOD BLD: NORMAL
EOSINOPHIL # BLD: 0.1 K/UL (ref 0–0.4)
EOSINOPHIL NFR BLD: 3 % (ref 0–7)
ERYTHROCYTE [DISTWIDTH] IN BLOOD BY AUTOMATED COUNT: 13.8 % (ref 11.5–14.5)
ERYTHROCYTE [SEDIMENTATION RATE] IN BLOOD: 1 MM/HR (ref 0–20)
HCT VFR BLD AUTO: 41.3 % (ref 36.6–50.3)
HGB BLD-MCNC: 14.5 G/DL (ref 12.1–17)
IMM GRANULOCYTES # BLD AUTO: 0 K/UL (ref 0–0.04)
IMM GRANULOCYTES NFR BLD AUTO: 0 % (ref 0–0.5)
LYMPHOCYTES # BLD: 1.5 K/UL (ref 0.8–3.5)
LYMPHOCYTES NFR BLD: 31 % (ref 12–49)
MCH RBC QN AUTO: 30.5 PG (ref 26–34)
MCHC RBC AUTO-ENTMCNC: 35.1 G/DL (ref 30–36.5)
MCV RBC AUTO: 86.8 FL (ref 80–99)
MONOCYTES # BLD: 0.4 K/UL (ref 0–1)
MONOCYTES NFR BLD: 8 % (ref 5–13)
NEUTS SEG # BLD: 2.7 K/UL (ref 1.8–8)
NEUTS SEG NFR BLD: 58 % (ref 32–75)
NRBC # BLD: 0 K/UL (ref 0–0.01)
NRBC BLD-RTO: 0 PER 100 WBC
PLATELET # BLD AUTO: 255 K/UL (ref 150–400)
PMV BLD AUTO: 10.7 FL (ref 8.9–12.9)
RBC # BLD AUTO: 4.76 M/UL (ref 4.1–5.7)
RHEUMATOID FACT SERPL-ACNC: <10 IU/ML
WBC # BLD AUTO: 4.8 K/UL (ref 4.1–11.1)

## 2021-11-19 ENCOUNTER — OFFICE VISIT (OUTPATIENT)
Dept: FAMILY MEDICINE CLINIC | Age: 61
End: 2021-11-19

## 2021-11-19 DIAGNOSIS — Z71.89 COUNSELING AND COORDINATION OF CARE: Primary | ICD-10-CM

## 2021-11-19 LAB
ANA SER QL: NEGATIVE
CCP IGA+IGG SERPL IA-ACNC: 10 UNITS (ref 0–19)

## 2021-11-19 PROCEDURE — 99080 SPECIAL REPORTS OR FORMS: CPT | Performed by: PHYSICIAN ASSISTANT

## 2021-11-19 NOTE — PROGRESS NOTES
Pt called OW saying his FA was denied and that FA told him he makes too much money. OW sent a staff message to supervisor, since Patient's income is not high and he has no money on his bank account. OW started a new Care card application for a new bill pt got. Pending POI and bank statement.  OW will f/up with patient

## 2021-12-02 ENCOUNTER — TELEPHONE (OUTPATIENT)
Dept: FAMILY MEDICINE CLINIC | Age: 61
End: 2021-12-02

## 2021-12-02 NOTE — TELEPHONE ENCOUNTER
Financial screening for a new Care Card. An appt was made for 12/7/21 at 3:15pm. Pt replied NO to all covid19 screening questions. Pending POI, Bills and Bank statement.

## 2021-12-14 ENCOUNTER — VIRTUAL VISIT (OUTPATIENT)
Dept: FAMILY MEDICINE CLINIC | Age: 61
End: 2021-12-14

## 2021-12-14 DIAGNOSIS — Z71.89 COUNSELING AND COORDINATION OF CARE: Primary | ICD-10-CM

## 2021-12-14 NOTE — PROGRESS NOTES
Pt called OW to schedule appt for FA application. An appt was made for 12/21/21 at 3:45pm. Patient replied NO to all covid19 screening questions. Pending POI. , bank statement

## 2021-12-20 ENCOUNTER — TELEPHONE (OUTPATIENT)
Dept: FAMILY MEDICINE CLINIC | Age: 61
End: 2021-12-20

## 2021-12-29 ENCOUNTER — TELEPHONE (OUTPATIENT)
Dept: FAMILY MEDICINE CLINIC | Age: 61
End: 2021-12-29

## 2021-12-29 NOTE — TELEPHONE ENCOUNTER
Received in email from the [facility] main office the request from the Pharmacy for Tamsulosin 0.4 mg. This medication is not on the pt's current medication list and in 09/21 the provider who saw him has it in her progress notes that -it is not known why the pt stopped the Tamsulosin except for  \"hurting his the kidneys. \" The pt was referred to Urology, and last [facility] provider to have an appt with the pt was routed this message.  Kali Angel RN

## 2022-01-04 ENCOUNTER — VIRTUAL VISIT (OUTPATIENT)
Dept: FAMILY MEDICINE CLINIC | Age: 62
End: 2022-01-04

## 2022-01-04 DIAGNOSIS — Z71.89 COUNSELING AND COORDINATION OF CARE: Primary | ICD-10-CM

## 2022-01-04 PROCEDURE — 99080 SPECIAL REPORTS OR FORMS: CPT | Performed by: PHYSICIAN ASSISTANT

## 2022-01-04 NOTE — PROGRESS NOTES
Pt called OW after receiving message. A new appt was made for 1/11/22 at 2:30pm. Patient replied NO to all covid19 screening questions. Pending POI, Bank statement and bills.

## 2022-01-10 NOTE — TELEPHONE ENCOUNTER
Refill request received again from the Pharmacy for Tamsulosin 0.4 mg this rx was sent for refill to Dr Valdo Brar. The rx was sent to the last provider who saw the pt.  Juan Conde RN

## 2022-01-13 ENCOUNTER — TELEPHONE (OUTPATIENT)
Dept: FAMILY MEDICINE CLINIC | Age: 62
End: 2022-01-13

## 2022-01-13 RX ORDER — TAMSULOSIN HYDROCHLORIDE 0.4 MG/1
0.4 CAPSULE ORAL
Qty: 90 CAPSULE | Refills: 1 | Status: SHIPPED | OUTPATIENT
Start: 2022-01-13

## 2022-01-13 NOTE — TELEPHONE ENCOUNTER
Can with check with patient or pharmacy.   Is patient requesting to restart medication? or is pharmacy just trying to renew medication without patient request.

## 2022-01-13 NOTE — TELEPHONE ENCOUNTER
VAUBREY. OW called patient to reschedule appt (OW will be working virtually due to [facility]). OW was unable to speak with patient or leave a vm. No mail box. OW sent a text message to patient explaining the reason of the call and asking pt to call OW back.

## 2022-01-13 NOTE — TELEPHONE ENCOUNTER
Tc to the pt to ask him if he was wanting to re-start the medication Tamsulosin AMN Int # [id number]. The pt stated the Urologist said that he would benefit from this medication and that he was going to give him the refills for the Tamsulosin, and the pt stated he has been several times to the Pharmacy, and the refills were not at the Pharmacy. The pt was asked if he was requesting the refills and he stated yes. The pt was told he could check back with his Pharmacy for the refill in a couple of days. This message was sent to the provider.  Abilio Avila RN

## 2022-01-14 ENCOUNTER — TELEPHONE (OUTPATIENT)
Dept: FAMILY MEDICINE CLINIC | Age: 62
End: 2022-01-14

## 2022-01-14 NOTE — TELEPHONE ENCOUNTER
V.V. Patient called OW to reschedule appt. An appt was made for 1/19/22 at 3:45pm. Patient replied NO to all covid19 screening questions. Pending POI and bills. (AN and CC application on the same day).

## 2022-01-17 NOTE — TELEPHONE ENCOUNTER
Tc to the pt 2x. AMN Int # [id number]. The pt was texted his coupon from the [facility] [employer] website  for the Tamsulosin 0.4 mg #90 capsules with 1 refill. The dose and frequency was reviewed with the pt. He verbalized understanding.  Monet Martínez RN

## 2022-01-18 ENCOUNTER — VIRTUAL VISIT (OUTPATIENT)
Dept: FAMILY MEDICINE CLINIC | Age: 62
End: 2022-01-18

## 2022-01-18 DIAGNOSIS — N40.1 BENIGN PROSTATIC HYPERPLASIA WITH URINARY RETENTION: ICD-10-CM

## 2022-01-18 DIAGNOSIS — R33.8 BENIGN PROSTATIC HYPERPLASIA WITH URINARY RETENTION: ICD-10-CM

## 2022-01-18 DIAGNOSIS — M25.50 POLYARTHRALGIA: Primary | ICD-10-CM

## 2022-01-18 DIAGNOSIS — R68.2 DRY MOUTH: ICD-10-CM

## 2022-01-18 PROCEDURE — 99442 PR PHYS/QHP TELEPHONE EVALUATION 11-20 MIN: CPT | Performed by: FAMILY MEDICINE

## 2022-01-18 RX ORDER — CELECOXIB 200 MG/1
200 CAPSULE ORAL 2 TIMES DAILY
Qty: 60 CAPSULE | Refills: 2 | Status: SHIPPED | OUTPATIENT
Start: 2022-01-18

## 2022-01-18 NOTE — PROGRESS NOTES
Timo Gibbons (: 1960) is a 64 y.o. male, established patient, Virtual Visit for evaluation of the following chief complaint(s): Other (pt c/o dry mouth x 3 months) and LOW BACK PAIN (f/up. Pt forget he has more refills at the pharmacy)       ASSESSMENT/PLAN:  1. Polyarthralgia  Restart Celebrex and follow up with Rheumatology  -     celecoxib (CELEBREX) 200 mg capsule; Take 1 Capsule by mouth two (2) times a day., Normal, Disp-60 Capsule, R-2  2. Dry mouth  This may be due to dry winter air. Discussed sx tx. It is not all the time. Consider connective tissue disease if it worsens given his generalized aches. 3. Benign prostatic hyperplasia with urinary retention  Resume Flomax. He is aware this Rx is waiting for him at pharmacy. No follow-ups on file. SUBJECTIVE/OBJECTIVE:  HPI  Dry Mouth: x 3 months, worse in AM off/on. Sleeps well. Does not snore. Myalgias:  Generalized aches which is relieved with Celebrex. Having more aches since he ran out (was not aware of refills)  Has not been taking Pamelor. BPH:  Patient saw Urology who recommended restarting Flomax. Patient is due to  medication. Review of Systems   Constitutional: Negative for chills, fatigue and fever. HENT: Negative for trouble swallowing. Respiratory: Negative for cough. Musculoskeletal: Positive for arthralgias and back pain. Negative for joint swelling. Skin: Negative for rash. Patient-Reported Weight: 137 lb    Physical Exam    On this date 2022 I have spent 19 minutes reviewing previous notes, test results and face to face (virtual) with the patient discussing the diagnosis and importance of compliance with the treatment plan as well as documenting on the day of the visit. Timo Gibbons, was evaluated through a synchronous (real-time) audio-video encounter.  The patient (or guardian if applicable) is aware that this is a billable service, which includes applicable co-pays. Verbal consent to proceed has been obtained. The visit was conducted pursuant to the emergency declaration under the [address] [place] [address] authority and the [employer] and [employer] General Act. Patient identification was verified, and a caregiver was present when appropriate. The patient was located in a state where the provider was licensed to provide care. Patient identification was verified at the start of the visit: YES    Services were provided through a phone synchronous discussion virtually to substitute for in-person clinic visit. Patient was located at home and provider was located in office or at home. An electronic signature was used to authenticate this note.   -- Jimmy Hearn MD

## 2022-01-18 NOTE — PROGRESS NOTES
Spoke with the patient on the telephone with the assistance of Vietnamese interpretor Meño Dailey & advised patient that a [employer] coupon was texted to him at [phone] for the Celebrex medication that Dr. Lizzy Rawls ordered today. .  Patient indicated understanding & appreciated the service & the telephone call.

## 2022-01-18 NOTE — PROGRESS NOTES
1100 am: SARA tried to call the patient several times and sent a text message. Pt do not answer. Patient does not have access to vital sign equipment. Coordination of Care  1. Have you been to the ER, urgent care clinic since your last visit? Hospitalized since your last visit? No    2. Have you seen or consulted any other health care providers outside of the [address] [place] [address] since your last visit? Include any pap smears or colon screening. No    Does the patient need refills? NO    Learning Assessment Complete?  yes  Depression Screening complete in the past 12 months? yes

## 2022-01-19 ENCOUNTER — IMMUNIZATION (OUTPATIENT)
Dept: FAMILY MEDICINE CLINIC | Age: 62
End: 2022-01-19

## 2022-01-19 ENCOUNTER — OFFICE VISIT (OUTPATIENT)
Dept: FAMILY MEDICINE CLINIC | Age: 62
End: 2022-01-19

## 2022-01-19 DIAGNOSIS — Z23 ENCOUNTER FOR IMMUNIZATION: Primary | ICD-10-CM

## 2022-01-19 DIAGNOSIS — Z71.89 COUNSELING AND COORDINATION OF CARE: Primary | ICD-10-CM

## 2022-01-19 PROCEDURE — 0004A COVID-19, PFIZER PURPLE TOP, DILUTE FOR USE, 12+ YRS, 30MCG/0.3ML DOSE: CPT

## 2022-01-19 PROCEDURE — 99080 SPECIAL REPORTS OR FORMS: CPT | Performed by: PHYSICIAN ASSISTANT

## 2022-01-19 PROCEDURE — 91300 COVID-19, PFIZER PURPLE TOP, DILUTE FOR USE, 12+ YRS, 30MCG/0.3ML DOSE: CPT

## 2022-01-19 NOTE — PROGRESS NOTES
OW Met with patient and assisted with AN and bills. AN application was completed and left on AN tray to Talita Baptiste. OW instructed pt to call AN on/after 2/2/22 to confirm referral.   Care Card application was also filled out and completed. Pt will mail it to [facility]. OW provided completed application on a self-addressed stamped envelope for pt to mail it. Patient had an old bill from [facility],[id number]. OW explained that a FA application cannot be submitted for a bill that is older than 6 months. CCard application was filled out for a recent bill (September 2021). Pt verbalized understanding.

## 2022-01-25 ENCOUNTER — VIRTUAL VISIT (OUTPATIENT)
Dept: FAMILY MEDICINE CLINIC | Age: 62
End: 2022-01-25

## 2022-01-25 DIAGNOSIS — M54.42 ACUTE LEFT-SIDED LOW BACK PAIN WITH LEFT-SIDED SCIATICA: Primary | ICD-10-CM

## 2022-01-25 PROCEDURE — 99443 PR PHYS/QHP TELEPHONE EVALUATION 21-30 MIN: CPT | Performed by: FAMILY MEDICINE

## 2022-01-25 RX ORDER — PREDNISONE 10 MG/1
TABLET ORAL
Qty: 21 TABLET | Refills: 0 | Status: SHIPPED | OUTPATIENT
Start: 2022-01-25 | End: 2022-05-16 | Stop reason: ALTCHOICE

## 2022-01-25 RX ORDER — LIDOCAINE 50 MG/G
PATCH TOPICAL
Qty: 15 EACH | Refills: 2 | Status: SHIPPED | OUTPATIENT
Start: 2022-01-25

## 2022-01-25 NOTE — PROGRESS NOTES
Tc to the pt with Olya Rolon as , for intake. Pain all over my body and saw specialist in April has pain in waist pain now. When asked he has had it a long time severe is for 1 week. When seated and stands up it is very very severe. No vs equipment available. The pt is only taking is Celebrex due to no money to buy the medications ordered. Coordination of Care  1. Have you been to the ER, urgent care clinic since your last visit? Hospitalized since your last visit? No    2. Have you seen or consulted any other health care providers outside of the [address] [place] [address] since your last visit? Include any pap smears or colon screening. No    Does the patient need refills? NO    Learning Assessment Complete?  no

## 2022-01-25 NOTE — PROGRESS NOTES
Nani Turner (: 1960) is a 64 y.o. male, established patient, Virtual Visit for evaluation of the following chief complaint(s):   Abdominal Pain (waist pain.)       ASSESSMENT/PLAN:  1. Acute left-sided low back pain with left-sided sciatica  flare with hx of spinal stenosis. Tx with Pred taper and Lidocaine patches. No follow-ups on file. SUBJECTIVE/OBJECTIVE:  HPI  LBP:  Patient with a hx of recurrent low back pain now with pain from low back, from the level of the waist, on Lt side radiating into Lt buttock. No LE numbness or weakness. Pain is much worse with going from sitting to standing. Some relief with Lidocaine patches, although Rx 5% have worked better for him than the OTC ones. CT Lumbar 2019: DDD in posterior Lt elements at L5/S1 with mild neuro foraminal stenosis B.  MRI Lumbar 2015: L3-L4 disc bulge causing moderate central canal spinal stenosis. Lt L3 enhancing nerve representing radiculitis. Mild-Mod central canal stenosis L4-L5. Multilevel foraminal stenosis. Review of Systems   Neurological: Negative for weakness and numbness. Change in bowel or bladder function. No data recorded    Physical Exam    On this date 2022 I have spent 21 minutes reviewing previous notes, test results and face to face (virtual) with the patient discussing the diagnosis and importance of compliance with the treatment plan as well as documenting on the day of the visit. Nani Turner, was evaluated through a synchronous (real-time) audio-video encounter. The patient (or guardian if applicable) is aware that this is a billable service, which includes applicable co-pays. Verbal consent to proceed has been obtained. The visit was conducted pursuant to the emergency declaration under the [facility], [address] [place] [address] authority and the [employer] and [employer] General Act.   Patient identification was verified, and a caregiver was present when appropriate. The patient was located at home in a state where the provider was licensed to provide care. Patient identification was verified at the start of the visit: YES    Services were provided through a phone synchronous discussion virtually to substitute for in-person clinic visit. Patient was located at home and provider was located in office or at home. An electronic signature was used to authenticate this note.   -- Rahat Ortiz MD

## 2022-01-25 NOTE — PROGRESS NOTES
Tc to the pt for discharge with Clemencia Freitas. Told pt that rx's have been sent to pharmacy and they should be ready for  in approximately 2 hrs. Pt told to donal present Oxxy coupon which we provide to your pharmacy to receive discounted drake.   Dejah Simpson RN

## 2022-05-16 ENCOUNTER — VIRTUAL VISIT (OUTPATIENT)
Dept: FAMILY MEDICINE CLINIC | Age: 62
End: 2022-05-16

## 2022-05-16 DIAGNOSIS — R25.1 TREMOR: Primary | ICD-10-CM

## 2022-05-16 PROCEDURE — 99442 PR PHYS/QHP TELEPHONE EVALUATION 11-20 MIN: CPT | Performed by: FAMILY MEDICINE

## 2022-05-16 NOTE — PROGRESS NOTES
Kinjal Sampson (: 1960) is a 64 y.o. male, established patient, Virtual Visit for evaluation of the following chief complaint(s):   Numbness (fingers mignon thumb. Numbness and shakiness. Both hands, for 15 days or longer. its gotten worse in the last 15 days.)       ASSESSMENT/PLAN:  1. Tremor  Now improved. Continue with B Complex supplement and recheck in a few weeks if sx persist.  Consider thyroid or other causes including PD. Return for follow up for F2F in 3 weeks for hand tremors. SUBJECTIVE/OBJECTIVE:  HPI   Numbness/Tremor:  Has noted tremor in thumbs off/on x 2-3 weeks. Has also noted numbness in fingertips lasting a few minutes during this time. Used some Neurobion Injections and feels sx improved. SHx:  No alcohol. No exposure to lead. FHx:  Mother with Parkinson's Disease, Alzheimer    Review of Systems   Constitutional: Negative for chills, diaphoresis, fever and unexpected weight change. Respiratory: Negative for cough and shortness of breath. Cardiovascular: Negative for chest pain. No data recorded    Physical Exam    On this date 2022 I have spent 19 minutes reviewing previous notes, test results and face to face (virtual) with the patient discussing the diagnosis and importance of compliance with the treatment plan as well as documenting on the day of the visit. Kinjal Sampson, was evaluated through a synchronous (real-time) audio-video encounter. The patient (or guardian if applicable) is aware that this is a billable service, which includes applicable co-pays. Verbal consent to proceed has been obtained. The visit was conducted pursuant to the emergency declaration under the [facility], [address] [place] [address] authority and the [employer] and [employer] General Act. Patient identification was verified, and a caregiver was present when appropriate.  The patient was located at home in a state where the provider was licensed to provide care. Patient identification was verified at the start of the visit: YES    Services were provided through a phone synchronous discussion virtually to substitute for in-person clinic visit. Patient was located at home and provider was located in office or at home. An electronic signature was used to authenticate this note.   -- Steph Miller MD

## 2022-05-16 NOTE — PROGRESS NOTES
Tc to the pt for intake with Int # [id number]. No vs equipment is available. Coordination of Care  1. Have you been to the ER, urgent care clinic since your last visit? Hospitalized since your last visit? No    2. Have you seen or consulted any other health care providers outside of the [address] [place] [address] since your last visit? Include any pap smears or colon screening. I went to [address] [place] [address] for an appt for arthritis. Results for some tests. Does the patient need refills?  YES    Learning Assessment Complete? no  Depression Screening complete in the past 12 months? yes

## 2022-06-09 ENCOUNTER — OFFICE VISIT (OUTPATIENT)
Dept: FAMILY MEDICINE CLINIC | Age: 62
End: 2022-06-09

## 2022-06-09 VITALS — BODY MASS INDEX: 19.36 KG/M2 | WEIGHT: 133.4 LBS

## 2022-06-09 DIAGNOSIS — R25.1 TREMOR: ICD-10-CM

## 2022-06-09 DIAGNOSIS — K29.50 CHRONIC GASTRITIS WITHOUT BLEEDING, UNSPECIFIED GASTRITIS TYPE: Primary | ICD-10-CM

## 2022-06-09 DIAGNOSIS — H10.33 ACUTE CONJUNCTIVITIS OF BOTH EYES, UNSPECIFIED ACUTE CONJUNCTIVITIS TYPE: ICD-10-CM

## 2022-06-09 PROCEDURE — 99214 OFFICE O/P EST MOD 30 MIN: CPT | Performed by: FAMILY MEDICINE

## 2022-06-09 RX ORDER — MULTIVIT WITH MINERALS/HERBS
1 TABLET ORAL DAILY
COMMUNITY

## 2022-06-09 RX ORDER — PANTOPRAZOLE SODIUM 40 MG/1
40 TABLET, DELAYED RELEASE ORAL DAILY
Qty: 30 TABLET | Refills: 1 | Status: SHIPPED | OUTPATIENT
Start: 2022-06-09

## 2022-06-09 NOTE — PROGRESS NOTES
I have printed AVS and reviewed it with patient today. Patient verbalized understanding. I reviewed with patient medications sent to pharmacy and how the medication is taken. Patient verbalized understanding. The patient was given the card for the discount medication program at [facility]. Patient verbalized understanding. An HUY was signed by the patient and will be faxed. Patient verbalized understanding. Patient correctly stated his full name and date of birth prior to the information shared.  [id number] with the [place] [address] assisted with this discharge.  Kimmy Biggs RN

## 2022-06-09 NOTE — PROGRESS NOTES
1. Have you been to the ER, urgent care clinic since your last visit? Hospitalized since your last visit? Yes Jose for gastritits. Patient was Rxd some medications but was not able to afford them so he did not pick pick them up from the pharmacy. 2. Have you seen or consulted any other health care providers outside of the [address] [place] [address] since your last visit? Include any pap smears or colon screening. Yes see above.

## 2022-06-09 NOTE — PROGRESS NOTES
Nicholas Kaiser (: 1960) is a 64 y.o. male, established patient, here for evaluation of the following chief complaint(s):  Itchy Eye (bilateral eye itchiness. Patient using OTC Eye drops for itchininess. Some relied noted with them) and Hospital Follow Up ([facility] 2022 for Gastritis. Pt not able to afford meds prescribed from hospital.)       ASSESSMENT/PLAN:  1. Chronic gastritis without bleeding, unspecified gastritis type  Improved with Aloe. May use Protonix PRN and follow up if not controlled. Will send for records from [address] [place] [address].  2. Acute conjunctivitis of both eyes, unspecified acute conjunctivitis type  NL exam, continue with OTC drops. 3. Tremor  Resolved with B Complex. SUBJECTIVE:  HPI   Tremor: improved with B Complex. Eyes Itching: x 1 week without redness, drainage, vision changes. Started OTC rewetting drops and sx have improved. Gastritis:  Chronic mild epigastric pain which worsened and so he went to [facility].  Eval is unavailable. They did US but was not told results. He was given some liquid and sx improved. He was not able to buy medications they prescribed. He is using Aloe and his sx are improved. SHx:  No alcohol, does not smoking    Review of Systems   Constitutional: Negative for appetite change, fatigue, fever and unexpected weight change. HENT: Negative for trouble swallowing. Respiratory: Negative for choking. Gastrointestinal: Negative for abdominal pain, blood in stool, constipation, diarrhea and nausea. OBJECTIVE:  Weight 133 lb 6.4 oz (60.5 kg). Physical Exam  CONSTITUTIONAL:  Well developed. No apparent distress. PSYCHIATRIC: Oriented to time, place, person & situation. Appropriate mood and affect. EYE: EOEMi, PERRL. No photophobia. Lids without swelling. Conjunctiva without erythema. Sclera without erythema. No cornea abnormalities noted. ABDOMEN:  Normal bowel sounds. Abdomen soft, non tender.   No hepatosplenomegaly or masses. NEUROLOGIC:  No resting or intention tremor noted. No results found for this visit on 06/09/22. An electronic signature was used to authenticate this note.   -- Freeman Linder MD

## 2022-08-03 ENCOUNTER — OFFICE VISIT (OUTPATIENT)
Dept: FAMILY MEDICINE CLINIC | Age: 62
End: 2022-08-03

## 2022-08-03 VITALS
TEMPERATURE: 97.8 F | DIASTOLIC BLOOD PRESSURE: 72 MMHG | OXYGEN SATURATION: 100 % | BODY MASS INDEX: 19.3 KG/M2 | HEART RATE: 65 BPM | SYSTOLIC BLOOD PRESSURE: 127 MMHG | WEIGHT: 133 LBS

## 2022-08-03 DIAGNOSIS — L20.82 FLEXURAL ECZEMA: Primary | ICD-10-CM

## 2022-08-03 PROCEDURE — 99213 OFFICE O/P EST LOW 20 MIN: CPT | Performed by: FAMILY MEDICINE

## 2022-08-03 RX ORDER — TRIAMCINOLONE ACETONIDE 1 MG/G
CREAM TOPICAL 2 TIMES DAILY
Qty: 454 G | Refills: 4 | Status: SHIPPED | OUTPATIENT
Start: 2022-08-03

## 2022-08-03 NOTE — PROGRESS NOTES
HISTORY OF PRESENT ILLNESS  Gilbert Douglass is a 64 y.o. male. HPI  Patient states about 1 month ago he has develped an itch around the skin folds,  he has received cream from dermatology in the past but has no refill. Dermatologist has recommended changes in the soap. Review of Systems   Constitutional:  Negative for chills, fever and weight loss. Cardiovascular:  Negative for chest pain, palpitations and orthopnea. Gastrointestinal:  Negative for abdominal pain and heartburn. Genitourinary:  Negative for dysuria, frequency and urgency. Musculoskeletal:  Negative for back pain, joint pain, myalgias and neck pain. Skin:  Positive for itching and rash. /72 (BP 1 Location: Left upper arm, BP Patient Position: Sitting, BP Cuff Size: Adult)   Pulse 65   Temp 97.8 °F (36.6 °C) (Temporal)   Wt 133 lb (60.3 kg)   SpO2 100%   BMI 19.30 kg/m²   Physical Exam  Constitutional:       General: He is not in acute distress. Appearance: He is not ill-appearing. HENT:      Right Ear: Tympanic membrane normal. There is no impacted cerumen. Left Ear: Tympanic membrane normal. There is no impacted cerumen. Nose: Nose normal. No congestion or rhinorrhea. Cardiovascular:      Rate and Rhythm: Normal rate and regular rhythm. Pulses: Normal pulses. Heart sounds: No murmur heard. Pulmonary:      Effort: Pulmonary effort is normal.      Breath sounds: Normal breath sounds. No wheezing or rhonchi. Abdominal:      General: Bowel sounds are normal. There is no distension. Palpations: Abdomen is soft. There is no mass. Musculoskeletal:         General: No swelling, tenderness or deformity. Comments: Flexion deformity of left 4th finger   Skin:     General: Skin is dry. Findings: Rash present. Comments: Dry patches on the skin folds, with lichenification from scratching   Neurological:      Mental Status: He is alert.        ASSESSMENT and PLAN  Diagnoses and all orders for this visit:    1. Flexural eczema  -     triamcinolone acetonide (KENALOG) 0.1 % topical cream; Apply  to affected area two (2) times a day.  use thin layer arms and legs x 3 weeks    Triamcinolone refill,  avoid irritants, use mild hypoallergenic soaps and detergents

## 2022-08-03 NOTE — PROGRESS NOTES
Coordination of Care  1. Have you been to the ER, urgent care clinic since your last visit? Hospitalized since your last visit? No    2. Have you seen or consulted any other health care providers outside of the [address] [place] [address] since your last visit? Include any pap smears or colon screening. No    Does the patient need refills? YES    Learning Assessment Complete?  yes

## 2022-09-15 ENCOUNTER — OFFICE VISIT (OUTPATIENT)
Dept: FAMILY MEDICINE CLINIC | Age: 62
End: 2022-09-15

## 2022-09-15 VITALS
OXYGEN SATURATION: 99 % | SYSTOLIC BLOOD PRESSURE: 135 MMHG | DIASTOLIC BLOOD PRESSURE: 73 MMHG | BODY MASS INDEX: 18.89 KG/M2 | HEART RATE: 53 BPM | TEMPERATURE: 97.5 F | WEIGHT: 130.2 LBS

## 2022-09-15 DIAGNOSIS — R22.42 MASS OF LEFT KNEE: Primary | ICD-10-CM

## 2022-09-15 DIAGNOSIS — J18.9 PNEUMONIA OF RIGHT MIDDLE LOBE DUE TO INFECTIOUS ORGANISM: ICD-10-CM

## 2022-09-15 PROCEDURE — 99214 OFFICE O/P EST MOD 30 MIN: CPT | Performed by: FAMILY MEDICINE

## 2022-09-15 NOTE — PROGRESS NOTES
Name and  confirmed w/ patient. An After Visit Summary was provided and all discharge instructions were reviewed with the patient including: imaging locations and care card/financial assistance. RN explained that he needs to get a CXR in 2 weeks. RN also assisted in making an ultrasound for appt for at [facility]. Time for questions and answers provided, patient verbalized understanding. Patient discharged from clinic in stable condition. [facility]  #[id number] assisted with this d/c.

## 2022-09-15 NOTE — PROGRESS NOTES
1. Have you been to the ER, urgent care clinic since your last visit? Hospitalized since your last visit? Yes [place]- 23 days ago  [facility] Aug 23    2. Have you seen or consulted any other health care providers outside of the [address] [place] [address] since your last visit? Include any pap smears or colon screening.  Yes Darnell Jaffe AUG 23

## 2022-09-15 NOTE — PROGRESS NOTES
Subhash Natarajan (: 1960) is a 64 y.o. male, established patient, here for evaluation of the following chief complaint(s):  Knee Problem (''Ball'' on left knee- Noticed about 15 days-1 month ago.) and Hospital Follow Up (Patient went to [facility] ED about 23 days ago. Due to rash and hip pain. Patient was prescribed 3 types of pills and 1 ointment but he did not bring medications with him nor knows the names.)       ASSESSMENT/PLAN:  1. Mass of left knee  Suspect a Baker's Cyst  -     US EXT NONVAS LT LTD; Future  2. Pneumonia of right middle lobe due to infectious organism  Sx have resolved, will schedule CXR to ensure resolution. -     XR CHEST PA LAT; Future      SUBJECTIVE:  Knee ball: swelling behind Lt knee x 2-4 weeks. Tender at times. Denies any Lt knee swelling, erythema, or pain. Hospital follow up:  went to [facility] ED 22 for flare of his abdominal pain. Pt provides pictures of some his results: U/A, CBC, Cr NL. CT A/P w IV:  RML basilar airspace opacity suspect pneumonia; excess stool, bladder distention, prostate enlargement, Lumbar spondylosis with advanced DDD L2-L3. Was given some antibiotics and steroid taper. Denies any cough, dyspnea, fevers, chills. Had cough, SOB worse 3-4 weeks prior to presentation. Review of Systems    OBJECTIVE:  Blood pressure 135/73, pulse (!) 53, temperature 97.5 °F (36.4 °C), temperature source Temporal, weight 130 lb 3.2 oz (59.1 kg), SpO2 99 %. Physical Exam  CONSTITUTIONAL:  Well developed. No apparent distress. PSYCHIATRIC: Oriented to time, place, person & situation. Appropriate mood and affect. CARDIOVASCULAR:  Regular rate and rhythm. Normal S1, S2. No extra sounds. RESPIRATORY:  Normal effort. Normal ascultation without wheezing. MUSCULOSKELETAL:  Lt knee without effusion or erythema. Posterior knee with prominent mass/swelling which is mildly tender, no erythema. EXTREMITIES:  No edema.     No results found for this visit on 09/15/22. An electronic signature was used to authenticate this note.   -- Reina Boykin MD

## 2022-09-23 ENCOUNTER — HOSPITAL ENCOUNTER (OUTPATIENT)
Dept: ULTRASOUND IMAGING | Age: 62
Discharge: HOME | End: 2022-09-23
Attending: FAMILY MEDICINE

## 2022-09-23 DIAGNOSIS — R22.42 MASS OF LEFT KNEE: ICD-10-CM

## 2022-09-23 PROCEDURE — 76882 US LMTD JT/FCL EVL NVASC XTR: CPT

## 2022-09-26 ENCOUNTER — TELEPHONE (OUTPATIENT)
Dept: FAMILY MEDICINE CLINIC | Age: 62
End: 2022-09-26

## 2022-09-26 DIAGNOSIS — R22.42 KNEE MASS, LEFT: Primary | ICD-10-CM

## 2022-09-26 NOTE — TELEPHONE ENCOUNTER
Tc to the pt to assist him to schedule his MRI of the left knee. He verified his name and . Reggie Nevarez was the . He was scheduled for the MRI at [facility]. The pt's appt was scheduled for arrival time for check in. 1:45 pm is the appt. Check in through the main entrance ground floor to admitting office is to the left. Go there to check in. Bring a list of medication and remove all metal and jewelry, bring photo ID.  Manuel Salomon RN

## 2022-09-26 NOTE — TELEPHONE ENCOUNTER
Received a telephone message from Gianna Guthrie in [facility] Radiology. Tc made to Meseret. She is asking if we ( the [facility]) have received the U/S results on the pt's Left Lower extremity. The results are in the chart there was not a provider note seen in the chart so I routed the message to the provider regarding this message from the Radiology dept. There was also a CXR done the same day results in the chart.  Kali Angel RN

## 2022-09-26 NOTE — PROGRESS NOTES
Spoke with patient and relayed results: complex cyst which is atypical for a Baker's cyst and MRI is recommended. Pt agrees and MRI ordered.

## 2022-09-29 ENCOUNTER — HOSPITAL ENCOUNTER (OUTPATIENT)
Dept: GENERAL RADIOLOGY | Age: 62
Discharge: HOME | End: 2022-09-29

## 2022-09-29 DIAGNOSIS — J18.9 PNEUMONIA OF RIGHT MIDDLE LOBE DUE TO INFECTIOUS ORGANISM: ICD-10-CM

## 2022-09-29 PROCEDURE — 71046 X-RAY EXAM CHEST 2 VIEWS: CPT

## 2022-10-03 ENCOUNTER — TELEPHONE (OUTPATIENT)
Dept: FAMILY MEDICINE CLINIC | Age: 62
End: 2022-10-03

## 2022-10-03 NOTE — TELEPHONE ENCOUNTER
Meseret from [employer] asked for a signed order by the Dr for a new order for MRI left knee W/O contrast. They willl not accept the order for MRI left knee with contrast. They asked for it to be faxed but they are in [facility] care so does not need to be faxed. Routed to the provider.  Rabia Garcia RN

## 2022-10-06 ENCOUNTER — TRANSCRIBE ORDER (OUTPATIENT)
Dept: SCHEDULING | Age: 62
End: 2022-10-06

## 2022-10-06 ENCOUNTER — HOSPITAL ENCOUNTER (OUTPATIENT)
Dept: MRI IMAGING | Age: 62
Discharge: HOME | End: 2022-10-06
Attending: FAMILY MEDICINE

## 2022-10-06 ENCOUNTER — TELEPHONE (OUTPATIENT)
Dept: FAMILY MEDICINE CLINIC | Age: 62
End: 2022-10-06

## 2022-10-06 DIAGNOSIS — R22.42 MASS OF LEFT KNEE: ICD-10-CM

## 2022-10-06 DIAGNOSIS — R22.42 LEG MASS, LEFT: Primary | ICD-10-CM

## 2022-10-06 PROCEDURE — 73723 MRI JOINT LWR EXTR W/O&W/DYE: CPT

## 2022-10-06 PROCEDURE — 74011250636 HC RX REV CODE- 250/636

## 2022-10-06 PROCEDURE — A9576 INJ PROHANCE MULTIPACK: HCPCS

## 2022-10-06 RX ADMIN — GADOTERIDOL 13 ML: 279.3 INJECTION, SOLUTION INTRAVENOUS at 15:24

## 2022-10-06 NOTE — TELEPHONE ENCOUNTER
Received a tc from [facility] central scheduling stating they were going to cancel the pt's MRI of his Left knee today. The order was to be MRI w/o contrast. The provider had entered the order wo contrast. This is the only order I could pull up for this test for MRI of the left knee as well. I called central scheduling, and spoke to the staff there, and she gave me the IMG # for this test. The provider was routed a message and signed the order. The pt's MRI will not be canceled.  Stacey Barrientos RN

## 2022-10-26 NOTE — PROGRESS NOTES
Spoke with patient to review MRI results. Mass is consistent with unusual Baker's cyst.  Other finding reviewed  including complex undersurface tear of meniscus and full thickness fissuring of patellar cartilage. Pt states his discomfort and swelling has improved with a natural arthritis cream.  He may continue to use this and if sx are not controlled will refer to Ortho.

## 2022-10-27 ENCOUNTER — OFFICE VISIT (OUTPATIENT)
Dept: FAMILY MEDICINE CLINIC | Age: 62
End: 2022-10-27

## 2022-10-27 ENCOUNTER — PATIENT OUTREACH (OUTPATIENT)
Dept: FAMILY MEDICINE CLINIC | Age: 62
End: 2022-10-27

## 2022-10-27 DIAGNOSIS — Z71.89 COUNSELING AND COORDINATION OF CARE: Primary | ICD-10-CM

## 2022-10-27 PROCEDURE — 99080 SPECIAL REPORTS OR FORMS: CPT | Performed by: NURSE PRACTITIONER

## 2022-10-27 NOTE — PROGRESS NOTES
FA application for hospital bills has been completed. Complete application with supporting documents have been given to patient in a self addressed and stamped envelope and will be mailed by patient. Patient has been screened for [facility]. Patient has requested resources for  Food (score 3). Resources have been provided.

## 2022-12-27 ENCOUNTER — TELEPHONE (OUTPATIENT)
Dept: FAMILY MEDICINE CLINIC | Age: 62
End: 2022-12-27

## 2023-01-27 ENCOUNTER — TELEPHONE (OUTPATIENT)
Dept: FAMILY MEDICINE CLINIC | Age: 63
End: 2023-01-27

## 2023-01-30 ENCOUNTER — TELEPHONE (OUTPATIENT)
Dept: FAMILY MEDICINE CLINIC | Age: 63
End: 2023-01-30

## 2023-01-31 ENCOUNTER — OFFICE VISIT (OUTPATIENT)
Dept: FAMILY MEDICINE CLINIC | Age: 63
End: 2023-01-31

## 2023-01-31 DIAGNOSIS — Z71.89 COUNSELING AND COORDINATION OF CARE: Primary | ICD-10-CM

## 2023-01-31 PROCEDURE — 99080 SPECIAL REPORTS OR FORMS: CPT | Performed by: NURSE PRACTITIONER

## 2023-04-06 ENCOUNTER — OFFICE VISIT (OUTPATIENT)
Dept: FAMILY MEDICINE CLINIC | Age: 63
End: 2023-04-06

## 2023-06-23 ENCOUNTER — HOSPITAL ENCOUNTER (OUTPATIENT)
Facility: HOSPITAL | Age: 63
Setting detail: SPECIMEN
Discharge: HOME | End: 2023-06-26

## 2023-06-23 ENCOUNTER — OFFICE VISIT (OUTPATIENT)
Age: 63
End: 2023-06-23

## 2023-06-23 VITALS
HEIGHT: 69 IN | RESPIRATION RATE: 16 BRPM | SYSTOLIC BLOOD PRESSURE: 121 MMHG | TEMPERATURE: 97.8 F | BODY MASS INDEX: 20.59 KG/M2 | DIASTOLIC BLOOD PRESSURE: 74 MMHG | OXYGEN SATURATION: 97 % | WEIGHT: 139 LBS | HEART RATE: 86 BPM

## 2023-06-23 DIAGNOSIS — L08.9 SKIN INFECTION: Primary | ICD-10-CM

## 2023-06-23 DIAGNOSIS — N40.1 BENIGN PROSTATIC HYPERPLASIA WITH URINARY FREQUENCY: ICD-10-CM

## 2023-06-23 DIAGNOSIS — R35.0 BENIGN PROSTATIC HYPERPLASIA WITH URINARY FREQUENCY: ICD-10-CM

## 2023-06-23 LAB
BILIRUBIN, URINE, POC: NEGATIVE
BLOOD URINE, POC: NEGATIVE
GLUCOSE URINE, POC: NEGATIVE
KETONES, URINE, POC: NEGATIVE
LEUKOCYTE ESTERASE, URINE, POC: NEGATIVE
NITRITE, URINE, POC: NEGATIVE
PH, URINE, POC: 7.5 (ref 4.6–8)
PROTEIN,URINE, POC: NEGATIVE
PSA SERPL-MCNC: 1.9 NG/ML (ref 0.01–4)
SPECIFIC GRAVITY, URINE, POC: 1.01 (ref 1–1.03)
URINALYSIS CLARITY, POC: CLEAR
URINALYSIS COLOR, POC: COLORLESS
UROBILINOGEN, POC: NORMAL

## 2023-06-23 PROCEDURE — 36415 COLL VENOUS BLD VENIPUNCTURE: CPT

## 2023-06-23 PROCEDURE — G0103 PSA SCREENING: HCPCS

## 2023-06-23 PROCEDURE — 99213 OFFICE O/P EST LOW 20 MIN: CPT | Performed by: NURSE PRACTITIONER

## 2023-06-23 PROCEDURE — 81003 URINALYSIS AUTO W/O SCOPE: CPT | Performed by: NURSE PRACTITIONER

## 2023-06-23 RX ORDER — FAMOTIDINE 40 MG/1
40 TABLET, FILM COATED ORAL DAILY
COMMUNITY
Start: 2020-10-29 | End: 2023-06-23

## 2023-06-23 RX ORDER — DICYCLOMINE HCL 20 MG
20 TABLET ORAL
COMMUNITY
Start: 2020-07-20 | End: 2023-06-23

## 2023-06-23 SDOH — ECONOMIC STABILITY: FOOD INSECURITY: WITHIN THE PAST 12 MONTHS, YOU WORRIED THAT YOUR FOOD WOULD RUN OUT BEFORE YOU GOT MONEY TO BUY MORE.: NEVER TRUE

## 2023-06-23 SDOH — ECONOMIC STABILITY: INCOME INSECURITY: HOW HARD IS IT FOR YOU TO PAY FOR THE VERY BASICS LIKE FOOD, HOUSING, MEDICAL CARE, AND HEATING?: NOT VERY HARD

## 2023-06-23 SDOH — ECONOMIC STABILITY: HOUSING INSECURITY
IN THE LAST 12 MONTHS, WAS THERE A TIME WHEN YOU DID NOT HAVE A STEADY PLACE TO SLEEP OR SLEPT IN A SHELTER (INCLUDING NOW)?: NO

## 2023-06-23 SDOH — ECONOMIC STABILITY: FOOD INSECURITY: WITHIN THE PAST 12 MONTHS, THE FOOD YOU BOUGHT JUST DIDN'T LAST AND YOU DIDN'T HAVE MONEY TO GET MORE.: NEVER TRUE

## 2023-06-23 ASSESSMENT — PATIENT HEALTH QUESTIONNAIRE - PHQ9
SUM OF ALL RESPONSES TO PHQ QUESTIONS 1-9: 0
1. LITTLE INTEREST OR PLEASURE IN DOING THINGS: 0
SUM OF ALL RESPONSES TO PHQ QUESTIONS 1-9: 0
2. FEELING DOWN, DEPRESSED OR HOPELESS: 0
SUM OF ALL RESPONSES TO PHQ9 QUESTIONS 1 & 2: 0

## 2023-06-28 ENCOUNTER — TELEPHONE (OUTPATIENT)
Age: 63
End: 2023-06-28

## 2023-06-30 ENCOUNTER — TELEPHONE (OUTPATIENT)
Age: 63
End: 2023-06-30

## 2023-07-12 ENCOUNTER — TELEPHONE (OUTPATIENT)
Age: 63
End: 2023-07-12

## 2023-07-12 NOTE — TELEPHONE ENCOUNTER
Phone call made to patient, name and  verified. Lab results were reviewed with patient per provider's note: \"Prostate screening exam is normal.\"    Per provider, treatment options to reduce frequent urination will be discussed at follow up appointment. Reminded patient of upcoming follow up appointment at [facility] clinic on 23 at 09:10 AM. Patient denies any questions, verbalizes understanding.     ----- Message from RANDY Crockett NP sent at 2023  9:12 PM EDT -----    Your prostate screening exam is normal.  We might want to talk about a treatment you have taken in the past which can help reduce the frequent urination.

## 2023-07-20 ENCOUNTER — TELEPHONE (OUTPATIENT)
Age: 63
End: 2023-07-20

## 2023-07-20 NOTE — TELEPHONE ENCOUNTER
I called the pt with the int. The pt verified his name and . The pt is asking for medication refill for knee pain and swollen knees, for 3 days. He also is asking for an appt for today. He stated they told me there were no available appt's. The pt is asking for a refill of a cream and the pill for pain and Dermatitis. But he stated it is also for the swelling of the knee. It is Triamcinolone cream 1 % that he spelled out for me. He has redness and pimples and a rash appears, I also has a cyst on my knee. He has inflammation and rash on his knees. I was asking him if the rash was on both knees when we got disconnected. The pt was called back . No answer. I called back. The pt answered. He would want the rx's sent to [facility] on [facility]. He was told the message would be sent to the provider. He is requesting an appt today or tomorrow. I advised the pt to go to the ED or Urgent Care if he was having severe pain swelling. The pt was told he could call the appt line and have a virtual appt if needed on  or Tues. The pt stated he needs something now. He has a cyst on his knee he stated as well. The pt's future appt for gastritis f/u os 23. We reviewed the appt details. The pt stated his knee could not wait until then. I told the pt I would send this message to the provider.  Jaycee Hollis RN

## 2023-07-20 NOTE — TELEPHONE ENCOUNTER
[address]  Call main office requesting 7/20/23  medicine refills for his knee patient stated that his knee is in pain and swollen , patient will be waiting for a phone call .     Thank you   Chio Khan

## 2023-09-18 RX ORDER — TRIAMCINOLONE ACETONIDE 1 MG/G
CREAM TOPICAL 2 TIMES DAILY
Qty: 15 G | Refills: 1 | Status: SHIPPED | OUTPATIENT
Start: 2023-09-18 | End: 2023-09-20 | Stop reason: SDUPTHER

## 2023-09-18 NOTE — TELEPHONE ENCOUNTER
The pt called the OW for tTriamcinolone cream refill. This message was sent to the provider.  Lucy Romano RN

## 2023-09-20 RX ORDER — TRIAMCINOLONE ACETONIDE 1 MG/G
CREAM TOPICAL 2 TIMES DAILY
Qty: 160 G | Refills: 1 | Status: SHIPPED | OUTPATIENT
Start: 2023-09-20

## 2023-09-20 RX ORDER — CLOTRIMAZOLE 1 %
CREAM (GRAM) TOPICAL
Qty: 60 G | Refills: 1 | Status: SHIPPED | OUTPATIENT
Start: 2023-09-20

## 2023-09-20 NOTE — TELEPHONE ENCOUNTER
I had a message on the cav main CBN line from the pt. From 09/19/23. I returned his call. Ngoc Edmondson was the . We called 3x. The pt answered. He is asking for a larger size tube or tub of Triamcinolone cream. 450 gms. I routed the pt's request to the provider.

## 2023-10-03 ENCOUNTER — OFFICE VISIT (OUTPATIENT)
Age: 63
End: 2023-10-03

## 2023-10-03 VITALS
OXYGEN SATURATION: 98 % | HEART RATE: 56 BPM | SYSTOLIC BLOOD PRESSURE: 119 MMHG | TEMPERATURE: 97.9 F | BODY MASS INDEX: 19.65 KG/M2 | WEIGHT: 134.2 LBS | DIASTOLIC BLOOD PRESSURE: 73 MMHG

## 2023-10-03 DIAGNOSIS — Z71.89 COUNSELING AND COORDINATION OF CARE: Primary | ICD-10-CM

## 2023-10-03 DIAGNOSIS — G89.18 POSTOPERATIVE PAIN OF LEFT KNEE: ICD-10-CM

## 2023-10-03 DIAGNOSIS — M25.562 POSTOPERATIVE PAIN OF LEFT KNEE: ICD-10-CM

## 2023-10-03 DIAGNOSIS — K29.50 CHRONIC GASTRITIS WITHOUT BLEEDING, UNSPECIFIED GASTRITIS TYPE: Primary | ICD-10-CM

## 2023-10-03 PROCEDURE — 99213 OFFICE O/P EST LOW 20 MIN: CPT | Performed by: NURSE PRACTITIONER

## 2023-10-03 PROCEDURE — 99080 SPECIAL REPORTS OR FORMS: CPT | Performed by: PHYSICIAN ASSISTANT

## 2023-10-03 RX ORDER — OMEPRAZOLE 20 MG/1
CAPSULE, DELAYED RELEASE ORAL
Qty: 30 CAPSULE | Refills: 5 | Status: SHIPPED | OUTPATIENT
Start: 2023-10-03 | End: 2023-10-03

## 2023-10-03 RX ORDER — OMEPRAZOLE 20 MG/1
CAPSULE, DELAYED RELEASE ORAL
Qty: 30 CAPSULE | Refills: 5 | Status: SHIPPED | OUTPATIENT
Start: 2023-10-03

## 2023-10-03 RX ORDER — SUCRALFATE 1 G/1
TABLET ORAL
Qty: 120 TABLET | Refills: 2 | Status: SHIPPED | OUTPATIENT
Start: 2023-10-03

## 2023-10-03 SDOH — ECONOMIC STABILITY: FOOD INSECURITY: WITHIN THE PAST 12 MONTHS, YOU WORRIED THAT YOUR FOOD WOULD RUN OUT BEFORE YOU GOT MONEY TO BUY MORE.: NEVER TRUE

## 2023-10-03 SDOH — ECONOMIC STABILITY: FOOD INSECURITY: WITHIN THE PAST 12 MONTHS, THE FOOD YOU BOUGHT JUST DIDN'T LAST AND YOU DIDN'T HAVE MONEY TO GET MORE.: NEVER TRUE

## 2023-10-03 SDOH — ECONOMIC STABILITY: INCOME INSECURITY: HOW HARD IS IT FOR YOU TO PAY FOR THE VERY BASICS LIKE FOOD, HOUSING, MEDICAL CARE, AND HEATING?: NOT HARD AT ALL

## 2023-10-03 ASSESSMENT — PATIENT HEALTH QUESTIONNAIRE - PHQ9
1. LITTLE INTEREST OR PLEASURE IN DOING THINGS: 0
SUM OF ALL RESPONSES TO PHQ QUESTIONS 1-9: 0
SUM OF ALL RESPONSES TO PHQ9 QUESTIONS 1 & 2: 0
SUM OF ALL RESPONSES TO PHQ QUESTIONS 1-9: 0
SUM OF ALL RESPONSES TO PHQ QUESTIONS 1-9: 0
2. FEELING DOWN, DEPRESSED OR HOPELESS: 0
SUM OF ALL RESPONSES TO PHQ QUESTIONS 1-9: 0

## 2023-10-03 NOTE — PROGRESS NOTES
Due to language barrier, an  was present during the history-taking and subsequent discussion with this patient. Zhao Baum [id number]  Coordination of Care  1. Have you been to the ER, urgent care clinic since your last visit? NO   Hospitalized since your last visit? NO    2. Have you seen or consulted any other health care providers outside of the [address] [place] [address] since your last visit? NO  Include any pap smears or colon screening. NO    Does the patient need refills? YES    Learning Assessment Complete? YES  Depression Screening complete in the past 12 months?   YES

## 2023-10-03 NOTE — PROGRESS NOTES
OW assisted patient with resources for rent-relief, food xiong and FA for [facility] medical bills. OW explained to patient that he would probably need a notarized support letter to complete FA application for [facility], since he's unemployed. A blank support letter was given to patient so he can have it notarized.

## 2023-10-03 NOTE — PROGRESS NOTES
Kwaku Vuong seen at d/c, full name and  verified. After Visit Summary provided and all discharge instructions reviewed. Patient to return for follow up appt in about 6 weeks. Medication list reviewed. Teach back method utilized to ensure patient accurately understands how to and when to take each medication. A release of information form was completed and signed by the patient for release of records from [facility].     Provided patient with supplies needed to collect H.Pylori sample. Reviewed instructions on how to collect the stool sample and utilized the teach-back method to ensure proper understanding. Educated patient on the importance of returning the stool sample to us as soon as possible within 24 hours of collection. Advised patient to refrigerate stool sample if collected 2 hours before drop off. [facility] for patient to choose an available drop off date (no appointment necessary). Patient verbalizes understanding. Opportunity for questions provided, patient denies any questions.  Melanie Booth RN

## 2023-10-03 NOTE — PROGRESS NOTES
10/3/2023 : Jade Juan (: 1960) is a 58 y.o. male,  established patient, here for evaluation of the following chief complaint(s):  GI Problem (Needs his medication for gastris ) and Other (States his  left knee is not healing well after surgery )     ASSESSMENT/PLAN:  Below is the assessment and plan developed based on review of pertinent history, physical exam, labs, studies, and medications. 1. Chronic gastritis without bleeding, unspecified gastritis type  -     sucralfate (CARAFATE) 1 GM tablet; TAKE 1 TABLET BY MOUTH TWICE DAILY FOR GERD, Disp-120 tablet, R-2Normal  -     omeprazole (PRILOSEC) 20 MG delayed release capsule; 1 po qday for gastritis., Disp-30 capsule, R-5Normal  -     H. Pylori Antigen, Stool; Future  2. Postoperative pain of left knee    Return for 6 wks LK; appt Daniela for bills, is it possible today? .      SUBJECTIVE/OBJECTIVE:  HPI   When was knee surgery?  This was through [facility].  Where was it done? Sees that surgeon every 8 days. He was told he cannot take care of this at home, he has to come in to [facility].  No results found for any visits on 10/03/23. Current Medications:   Current Outpatient Medications   Medication Sig Dispense Refill    sucralfate (CARAFATE) 1 GM tablet TAKE 1 TABLET BY MOUTH TWICE DAILY FOR GERD 120 tablet 2    omeprazole (PRILOSEC) 20 MG delayed release capsule 1 po qday for gastritis. 30 capsule 5    clotrimazole (LOTRIMIN) 1 % cream Apply topically 2 times daily. 60 g 1    triamcinolone (KENALOG) 0.1 % cream Apply topically 2 times daily 160 g 1    meloxicam (MOBIC) 15 MG tablet Take 1 tablet by mouth daily 30 tablet 3    lidocaine (LIDODERM) 5 % Apply patch to the affected area for 12 hours a day and remove for 12 hours a day.  30 patch 1    B Complex Vitamins (B COMPLEX 1 PO) Take 1 tablet by mouth daily (Patient not taking: Reported on 2023)       No current facility-administered medications for

## 2023-10-06 ENCOUNTER — HOSPITAL ENCOUNTER (OUTPATIENT)
Facility: HOSPITAL | Age: 63
Setting detail: SPECIMEN
Discharge: HOME | End: 2023-10-09

## 2023-10-06 ENCOUNTER — NURSE ONLY (OUTPATIENT)
Age: 63
End: 2023-10-06

## 2023-10-06 DIAGNOSIS — K29.50 CHRONIC GASTRITIS WITHOUT BLEEDING, UNSPECIFIED GASTRITIS TYPE: ICD-10-CM

## 2023-10-06 PROCEDURE — 36415 COLL VENOUS BLD VENIPUNCTURE: CPT

## 2023-10-06 PROCEDURE — 87338 HPYLORI STOOL AG IA: CPT

## 2023-10-06 NOTE — PROGRESS NOTES
[facility] FOR DROP OFF OF STOOL SPECIMEN. LAB COLLECTED: h Pylori  PT AWARE THAT RESULTS WILL BE EITHER MAILED OR CALLED. PT VERBALIZED UNDERSTANDING.   Delfina Arreola RN

## 2023-10-09 LAB
H PYLORI AG STL QL IA: NEGATIVE
SPECIMEN SOURCE: NORMAL

## 2023-10-27 ENCOUNTER — OFFICE VISIT (OUTPATIENT)
Age: 63
End: 2023-10-27

## 2023-10-27 VITALS
DIASTOLIC BLOOD PRESSURE: 80 MMHG | BODY MASS INDEX: 19.86 KG/M2 | TEMPERATURE: 97.5 F | SYSTOLIC BLOOD PRESSURE: 126 MMHG | OXYGEN SATURATION: 98 % | WEIGHT: 135.6 LBS | HEART RATE: 82 BPM

## 2023-10-27 DIAGNOSIS — M25.562 POSTOPERATIVE PAIN OF LEFT KNEE: Primary | ICD-10-CM

## 2023-10-27 DIAGNOSIS — G89.18 POSTOPERATIVE PAIN OF LEFT KNEE: Primary | ICD-10-CM

## 2023-10-27 DIAGNOSIS — K29.50 CHRONIC GASTRITIS WITHOUT BLEEDING, UNSPECIFIED GASTRITIS TYPE: ICD-10-CM

## 2023-10-27 PROCEDURE — 99214 OFFICE O/P EST MOD 30 MIN: CPT | Performed by: NURSE PRACTITIONER

## 2023-10-27 ASSESSMENT — PATIENT HEALTH QUESTIONNAIRE - PHQ9
SUM OF ALL RESPONSES TO PHQ9 QUESTIONS 1 & 2: 0
1. LITTLE INTEREST OR PLEASURE IN DOING THINGS: 0
2. FEELING DOWN, DEPRESSED OR HOPELESS: 0
SUM OF ALL RESPONSES TO PHQ QUESTIONS 1-9: 0

## 2023-10-27 ASSESSMENT — SOCIAL DETERMINANTS OF HEALTH (SDOH): HOW HARD IS IT FOR YOU TO PAY FOR THE VERY BASICS LIKE FOOD, HOUSING, MEDICAL CARE, AND HEATING?: SOMEWHAT HARD

## 2023-10-27 NOTE — PROGRESS NOTES
10/27/2023 : Jeanine Felix. -he had a lung test that showed something. He will need an annual CT . Rock Gallegos (: 1960) is a 58 y.o. male,  established patient, here for evaluation of the following chief complaint(s):  Post-Op Check (Pt notes that he is due for an x-ray of his knee after surgery. He is still having some issues with pain at times. ) and GI Problem (Pt is here to follow up on gastritis diagnoses)     ASSESSMENT/PLAN: stomach feels much better and also using natural medicines. No blood in the stool. Current plan of care for GI is working. Declines medication for GI. Below is the assessment and plan developed based on review of pertinent history, physical exam, labs, studies, and medications. He no longer feels the cyst below the knee. Pain anterior and posterior and sometimes plantar foot. 1. Postoperative pain of left knee  -     XR KNEE LEFT (MIN 4 VIEWS); Future  2. Chronic gastritis without bleeding, unspecified gastritis type  -     Occult Blood Stool Immunoassay; Future    Return for 1 month LK. Wt Readings from Last 3 Encounters:   10/27/23 61.5 kg (135 lb 9.6 oz)   10/03/23 60.9 kg (134 lb 3.2 oz)   23 60.8 kg (134 lb)         SUBJECTIVE/OBJECTIVE:  HPI   No results found for any visits on 10/27/23. Current Medications:   Current Outpatient Medications   Medication Sig Dispense Refill    sucralfate (CARAFATE) 1 GM tablet TAKE 1 TABLET BY MOUTH TWICE DAILY FOR GERD 120 tablet 2    omeprazole (PRILOSEC) 20 MG delayed release capsule 1 po qday for gastritis. 30 capsule 5    clotrimazole (LOTRIMIN) 1 % cream Apply topically 2 times daily.  60 g 1    triamcinolone (KENALOG) 0.1 % cream Apply topically 2 times daily 160 g 1    meloxicam (MOBIC) 15 MG tablet Take 1 tablet by mouth daily (Patient not taking: Reported on 10/27/2023) 30 tablet 3    B Complex Vitamins (B COMPLEX 1 PO) Take 1 tablet by mouth daily (Patient not taking: Reported on 2023)

## 2023-10-27 NOTE — PROGRESS NOTES
[facility]:  [id number]. Jamey Sewell LPN    Patient name and date of birth verified by . Patient given an after visit summary, advised to schedule next appointment before leaving clinic office today. Patient given an colon cancer screening test ( FIT) with instructions and demonstration provided on how to collect stool specimen. Once collected advised to return to [facility] ( lab drop off ) before 3:30 pm, advised to get calendar at . Patient verbalized understanding of all information given at time of visit.   Jamey Sewell LPN

## 2023-11-08 ENCOUNTER — HOSPITAL ENCOUNTER (OUTPATIENT)
Facility: HOSPITAL | Age: 63
Setting detail: SPECIMEN
Discharge: HOME | End: 2023-11-11

## 2023-11-08 ENCOUNTER — NURSE ONLY (OUTPATIENT)
Age: 63
End: 2023-11-08

## 2023-11-08 DIAGNOSIS — K29.50 CHRONIC GASTRITIS WITHOUT BLEEDING, UNSPECIFIED GASTRITIS TYPE: ICD-10-CM

## 2023-11-08 PROCEDURE — 82274 ASSAY TEST FOR BLOOD FECAL: CPT

## 2023-11-08 NOTE — PROGRESS NOTES
Pt presented to lab for drop off of fecal specimen. RN checked specimen for correct collection by pt and noted no error.

## 2023-11-10 LAB — HEMOCCULT STL QL IA: NEGATIVE

## 2023-12-07 ENCOUNTER — OFFICE VISIT (OUTPATIENT)
Age: 63
End: 2023-12-07

## 2023-12-07 VITALS
SYSTOLIC BLOOD PRESSURE: 123 MMHG | DIASTOLIC BLOOD PRESSURE: 71 MMHG | HEIGHT: 69 IN | HEART RATE: 66 BPM | WEIGHT: 136.2 LBS | BODY MASS INDEX: 20.17 KG/M2 | RESPIRATION RATE: 18 BRPM | OXYGEN SATURATION: 97 % | TEMPERATURE: 98.1 F

## 2023-12-07 DIAGNOSIS — K29.50 CHRONIC GASTRITIS WITHOUT BLEEDING, UNSPECIFIED GASTRITIS TYPE: ICD-10-CM

## 2023-12-07 PROCEDURE — 99213 OFFICE O/P EST LOW 20 MIN: CPT | Performed by: NURSE PRACTITIONER

## 2023-12-07 RX ORDER — OMEPRAZOLE 20 MG/1
CAPSULE, DELAYED RELEASE ORAL
Qty: 30 CAPSULE | Refills: 12 | Status: SHIPPED | OUTPATIENT
Start: 2023-12-07

## 2023-12-07 RX ORDER — SUCRALFATE 1 G/1
TABLET ORAL
Qty: 180 TABLET | Refills: 3 | Status: SHIPPED | OUTPATIENT
Start: 2023-12-07

## 2023-12-07 SDOH — ECONOMIC STABILITY: FOOD INSECURITY: WITHIN THE PAST 12 MONTHS, YOU WORRIED THAT YOUR FOOD WOULD RUN OUT BEFORE YOU GOT MONEY TO BUY MORE.: NEVER TRUE

## 2023-12-07 SDOH — ECONOMIC STABILITY: FOOD INSECURITY: WITHIN THE PAST 12 MONTHS, THE FOOD YOU BOUGHT JUST DIDN'T LAST AND YOU DIDN'T HAVE MONEY TO GET MORE.: NEVER TRUE

## 2023-12-07 ASSESSMENT — SOCIAL DETERMINANTS OF HEALTH (SDOH)

## 2023-12-07 ASSESSMENT — PATIENT HEALTH QUESTIONNAIRE - PHQ9
SUM OF ALL RESPONSES TO PHQ QUESTIONS 1-9: 0
SUM OF ALL RESPONSES TO PHQ QUESTIONS 1-9: 0
2. FEELING DOWN, DEPRESSED OR HOPELESS: 0
SUM OF ALL RESPONSES TO PHQ QUESTIONS 1-9: 0
1. LITTLE INTEREST OR PLEASURE IN DOING THINGS: 0
SUM OF ALL RESPONSES TO PHQ9 QUESTIONS 1 & 2: 0
SUM OF ALL RESPONSES TO PHQ QUESTIONS 1-9: 0

## 2023-12-07 ASSESSMENT — LIFESTYLE VARIABLES
HOW MANY STANDARD DRINKS CONTAINING ALCOHOL DO YOU HAVE ON A TYPICAL DAY: PATIENT DOES NOT DRINK
HOW OFTEN DO YOU HAVE A DRINK CONTAINING ALCOHOL: NEVER

## 2023-12-07 NOTE — PROGRESS NOTES
2023 : Minerva Ignacia (: 1960) is a 61 y.o. male,  established patient, here for evaluation of the following chief complaint(s):  Gastroesophageal Reflux (GERD/gastritis follow-up, review most recent labs) and Knee Pain (F/up knee pain s/p surgery 2023; pt states his knee overall feels better)     ASSESSMENT/PLAN: Continue current treatment. Below is the assessment and plan developed based on review of pertinent history, physical exam, labs, studies, and medications. 1. Chronic gastritis without bleeding, unspecified gastritis type  -     omeprazole (PRILOSEC) 20 MG delayed release capsule; 1 po qday for gastritis., Disp-30 capsule, R-12Normal  -     sucralfate (CARAFATE) 1 GM tablet; TAKE 1 TABLET BY MOUTH TWICE DAILY FOR GERD, Disp-180 tablet, R-3Normal    Return for 6 months LK. SUBJECTIVE/OBJECTIVE:  HPI takes aloe vera. Cough is controlled. Knee is much improved. Component      Latest Ref Rng 10/6/2023   Sample Site        STOOL    H Pylori Ag, Stool      Negative   Negative        No results found for any visits on 23. Current Medications:   Current Outpatient Medications   Medication Sig Dispense Refill    omeprazole (PRILOSEC) 20 MG delayed release capsule 1 po qday for gastritis. 30 capsule 12    sucralfate (CARAFATE) 1 GM tablet TAKE 1 TABLET BY MOUTH TWICE DAILY FOR GERD 180 tablet 3    clotrimazole (LOTRIMIN) 1 % cream Apply topically 2 times daily. 60 g 1    triamcinolone (KENALOG) 0.1 % cream Apply topically 2 times daily 160 g 1    meloxicam (MOBIC) 15 MG tablet Take 1 tablet by mouth daily (Patient not taking: Reported on 10/27/2023) 30 tablet 3    B Complex Vitamins (B COMPLEX 1 PO) Take 1 tablet by mouth daily (Patient not taking: Reported on 2023)      lidocaine (LIDODERM) 5 % Apply patch to the affected area for 12 hours a day and remove for 12 hours a day.  (Patient not taking: Reported on 10/27/2023) 30 patch 1     No

## 2023-12-07 NOTE — PROGRESS NOTES
[facility] services:  [id number]. Jeimy Zhang LPN    Patient name and date of birth verified by . Patient given an after visit summary, reviewed medications on how and when to take, coupons given to present to pharmacy for medication discount. Advised to schedule next appointment before leaving clinic office. Patient verbalized understanding of all information given at time of visit. Jeimy Zhang LPN    Patient signed release of information  form to obtain records from [facility] for recent visit on 12/03/2023.   Jeimy Zhang LPN

## 2024-01-04 ENCOUNTER — TELEPHONE (OUTPATIENT)
Age: 64
End: 2024-01-04

## 2024-01-04 NOTE — TELEPHONE ENCOUNTER
Received a call from patient to AN/PAP coordinator number. He inquired about how to renew his Access Now eligibility.     Per [facility] portal, his eligibility will  24. He is currently following with rheumatology. I told the patient I will notify our ORW and he will receive a call to schedule for eligibility renewal. He verbalized understanding.     The patient also asked for his address to be updated to [address], [address], [facility] [place] [facility], [address].     This encounter routed to CHAVEZ Chakraborty.     Lauren Wolfe RN

## 2024-01-09 ENCOUNTER — OFFICE VISIT (OUTPATIENT)
Age: 64
End: 2024-01-09

## 2024-01-09 DIAGNOSIS — Z71.89 COUNSELING AND COORDINATION OF CARE: Primary | ICD-10-CM

## 2024-01-09 PROCEDURE — 99080 SPECIAL REPORTS OR FORMS: CPT | Performed by: NURSE PRACTITIONER

## 2024-01-09 NOTE — PROGRESS NOTES
AN financial screening has been completed. Patient has been instructed to call AN appointment line on or after 1/23/24 to be scheduled with specialist.

## 2024-02-14 ENCOUNTER — CLINICAL DOCUMENTATION (OUTPATIENT)
Age: 64
End: 2024-02-14

## 2024-02-14 NOTE — PROGRESS NOTES
Received a returned lab letter in the mail stating undeliverable as addressed-unable to forward. I reviewed the pt's chart and the address had been changed. I re-sent the lab letter with the corrected address to the pt. Ludivina Mendiola RN

## 2024-04-26 ENCOUNTER — HOSPITAL ENCOUNTER (OUTPATIENT)
Facility: HOSPITAL | Age: 64
End: 2024-04-26
Payer: SUBSIDIZED

## 2024-04-26 ENCOUNTER — HOSPITAL ENCOUNTER (OUTPATIENT)
Facility: HOSPITAL | Age: 64
Discharge: HOME | End: 2024-04-26
Payer: SUBSIDIZED

## 2024-04-26 DIAGNOSIS — R10.13 EPIGASTRIC PAIN: ICD-10-CM

## 2024-04-26 PROCEDURE — 76700 US EXAM ABDOM COMPLETE: CPT

## 2024-04-26 PROCEDURE — 74220 X-RAY XM ESOPHAGUS 1CNTRST: CPT

## 2024-08-20 ENCOUNTER — HOSPITAL ENCOUNTER (OUTPATIENT)
Facility: HOSPITAL | Age: 64
Setting detail: SPECIMEN
Discharge: HOME | End: 2024-08-23

## 2024-08-20 PROCEDURE — 84153 ASSAY OF PSA TOTAL: CPT

## 2024-08-20 PROCEDURE — 80053 COMPREHEN METABOLIC PANEL: CPT

## 2024-08-20 PROCEDURE — 84443 ASSAY THYROID STIM HORMONE: CPT

## 2024-08-20 PROCEDURE — 85025 COMPLETE CBC W/AUTO DIFF WBC: CPT

## 2024-08-21 LAB
ALBUMIN SERPL-MCNC: 4.1 G/DL (ref 3.5–5)
ALBUMIN/GLOB SERPL: 1.3 (ref 1.1–2.2)
ALP SERPL-CCNC: 82 U/L (ref 45–117)
ALT SERPL-CCNC: 18 U/L (ref 12–78)
ANION GAP SERPL CALC-SCNC: 5 MMOL/L (ref 5–15)
AST SERPL-CCNC: 18 U/L (ref 15–37)
BASOPHILS # BLD: 0 K/UL (ref 0–0.1)
BASOPHILS NFR BLD: 0 % (ref 0–1)
BILIRUB SERPL-MCNC: 0.9 MG/DL (ref 0.2–1)
BUN SERPL-MCNC: 7 MG/DL (ref 6–20)
BUN/CREAT SERPL: 8 (ref 12–20)
CALCIUM SERPL-MCNC: 8.7 MG/DL (ref 8.5–10.1)
CHLORIDE SERPL-SCNC: 107 MMOL/L (ref 97–108)
CO2 SERPL-SCNC: 28 MMOL/L (ref 21–32)
CREAT SERPL-MCNC: 0.87 MG/DL (ref 0.7–1.3)
DIFFERENTIAL METHOD BLD: ABNORMAL
EOSINOPHIL # BLD: 0.1 K/UL (ref 0–0.4)
EOSINOPHIL NFR BLD: 2 % (ref 0–7)
ERYTHROCYTE [DISTWIDTH] IN BLOOD BY AUTOMATED COUNT: 14.6 % (ref 11.5–14.5)
GLOBULIN SER CALC-MCNC: 3.1 G/DL (ref 2–4)
GLUCOSE SERPL-MCNC: 103 MG/DL (ref 65–100)
HCT VFR BLD AUTO: 43.4 % (ref 36.6–50.3)
HGB BLD-MCNC: 14.3 G/DL (ref 12.1–17)
IMM GRANULOCYTES # BLD AUTO: 0 K/UL (ref 0–0.04)
IMM GRANULOCYTES NFR BLD AUTO: 0 % (ref 0–0.5)
LYMPHOCYTES # BLD: 1.8 K/UL (ref 0.8–3.5)
LYMPHOCYTES NFR BLD: 35 % (ref 12–49)
MCH RBC QN AUTO: 29.1 PG (ref 26–34)
MCHC RBC AUTO-ENTMCNC: 32.9 G/DL (ref 30–36.5)
MCV RBC AUTO: 88.4 FL (ref 80–99)
MONOCYTES # BLD: 0.4 K/UL (ref 0–1)
MONOCYTES NFR BLD: 8 % (ref 5–13)
NEUTS SEG # BLD: 2.8 K/UL (ref 1.8–8)
NEUTS SEG NFR BLD: 55 % (ref 32–75)
NRBC # BLD: 0 K/UL (ref 0–0.01)
NRBC BLD-RTO: 0 PER 100 WBC
PLATELET # BLD AUTO: 287 K/UL (ref 150–400)
PMV BLD AUTO: 10.5 FL (ref 8.9–12.9)
POTASSIUM SERPL-SCNC: 4.6 MMOL/L (ref 3.5–5.1)
PROT SERPL-MCNC: 7.2 G/DL (ref 6.4–8.2)
PSA SERPL-MCNC: 2.1 NG/ML (ref 0.01–4)
RBC # BLD AUTO: 4.91 M/UL (ref 4.1–5.7)
SODIUM SERPL-SCNC: 140 MMOL/L (ref 136–145)
TSH SERPL DL<=0.05 MIU/L-ACNC: 1.23 UIU/ML (ref 0.36–3.74)
WBC # BLD AUTO: 5.1 K/UL (ref 4.1–11.1)

## 2024-10-03 NOTE — PROGRESS NOTES
Edgar Watson (: 1960) is a 61 y.o. male, established patient, here for evaluation of the following chief complaint(s):   Skin Infection (f/u) and Diarrhea (f/u)       ASSESSMENT/PLAN:  1. Arthralgia, unspecified joint    Continued generalized arthralgias that are worse in AM need labs for RA. Recommended in person exam to evaluate for swollen joints in hands and consider xrays also. Return in about 2 weeks (around 3/2/2021) for follow up for F2F in 2 weeks for labs for joint pain. SUBJECTIVE/OBJECTIVE:  HPI   Diarrhea:  Resolved with Probiotics. Arthralgia:  Generalized joint pain for several months. Improved a little with starting Vitamin D but continues with aches in B shoulders, hands, thighs, legs. Worse in morning. Is taking Naprosyn BID. Review of Systems   Constitutional: Negative for chills, fever and unexpected weight change. Musculoskeletal: Positive for arthralgias. Negative for gait problem and joint swelling. No flowsheet data found. Physical Exam    On this date 21 I have spent 13 minutes reviewing previous notes, test results and face to face (virtual) with the patient discussing the diagnosis and importance of compliance with the treatment plan as well as documenting on the day of the visit. Edgar Watson is being evaluated by a Virtual Visit (phone visit) encounter to address concerns as mentioned above. A caregiver was present when appropriate. Due to this being a TeleHealth encounter (During [place] [address] public health emergency), evaluation of the following organ systems was limited: Vitals/Constitutional/EENT/Resp/CV/GI//MS/Neuro/Skin/Heme-Lymph-Imm.   Pursuant to the emergency declaration under the [facility], [facility]5 waiver authority and the [employer] and [employer] Act, this Virtual Visit was conducted with patient's (and/or legal guardian's) consent, to reduce the patient's risk of exposure to COVID-19 and provide necessary medical care. The patient (and/or legal guardian) has also been advised to contact this office for worsening conditions or problems, and seek emergency medical treatment and/or call 911 if deemed necessary. Patient identification was verified at the start of the visit: YES    Services were provided through a phone synchronous discussion virtually to substitute for in-person clinic visit. Patient was located at home and provider was located in office or at home. An electronic signature was used to authenticate this note.   -- Cyn Raygoza MD LOS: 16d    VITALS:   T(C): 36.4 (10-16-24 @ 11:50), Max: 36.7 (10-16-24 @ 04:01)  HR: 71 (10-16-24 @ 11:50) (71 - 78)  BP: 101/62 (10-16-24 @ 11:50) (101/62 - 108/66)  RR: 18 (10-16-24 @ 11:50) (18 - 18)  SpO2: 93% (10-16-24 @ 11:50) (93% - 96%)    GENERAL: NAD, lying in bed comfortably, elderly  HEAD:  Atraumatic, Normocephalic  EYES: EOMI, PERRLA, conjunctiva and sclera clear  ENT: Moist mucous membranes  NECK: Supple, No JVD  CHEST/LUNG: Clear to auscultation bilaterally; No rales, rhonchi, wheezing, or rubs. Unlabored respirations  HEART: Regular rate and rhythm; No murmurs, rubs, or gallops  ABDOMEN: BSx4; Soft, nontender, nondistended  EXTREMITIES:  2+ Peripheral Pulses, brisk capillary refill. No clubbing, cyanosis, or edema  NERVOUS SYSTEM:  A&Ox2, no focal deficits   SKIN: No rashes or lesions

## (undated) DEVICE — BAG SPEC BIOHZRD 10 X 10 IN --

## (undated) DEVICE — ELECTRODE,RADIOTRANSLUCENT,FOAM,5PK: Brand: MEDLINE

## (undated) DEVICE — Z DISCONTINUED PER MEDLINE LINE GAS SAMPLING O2/CO2 LNG AD 13 FT NSL W/ TBNG FILTERLINE

## (undated) DEVICE — NEEDLE HYPO 18GA L1.5IN PNK S STL HUB POLYPR SHLD REG BVL

## (undated) DEVICE — SET ADMIN 16ML TBNG L100IN 2 Y INJ SITE IV PIGGY BK DISP

## (undated) DEVICE — Device

## (undated) DEVICE — BLOCK BITE ENDOSCP AD 21 MM W/ DIL BLU LF DISP

## (undated) DEVICE — YANKAUER,TAPERED BULBOUS TIP,W/O VENT: Brand: MEDLINE

## (undated) DEVICE — TOWEL 4 PLY TISS 19X30 SUE WHT

## (undated) DEVICE — SYR 3ML LL TIP 1/10ML GRAD --

## (undated) DEVICE — FORCEPS BX L160CM DIA8MM GRSP DISECT CUP TIP NONLOCKING ROT

## (undated) DEVICE — SOLIDIFIER MEDC 1200ML -- CONVERT TO 356117

## (undated) DEVICE — 1200 GUARD II KIT W/5MM TUBE W/O VAC TUBE: Brand: GUARDIAN

## (undated) DEVICE — SYR 10ML LUER LOK 1/5ML GRAD --

## (undated) DEVICE — CONTAINER SPEC 20 ML LID NEUT BUFF FORMALIN 10 % POLYPR STS

## (undated) DEVICE — CATH IV AUTOGRD BC PNK 20GA 25 -- INSYTE

## (undated) DEVICE — NEONATAL-ADULT SPO2 SENSOR: Brand: NELLCOR

## (undated) DEVICE — BASIN EMSIS 16OZ GRAPHITE PLAS KID SHP MOLD GRAD FOR ORAL